# Patient Record
Sex: FEMALE | Race: WHITE | NOT HISPANIC OR LATINO | Employment: OTHER | URBAN - METROPOLITAN AREA
[De-identification: names, ages, dates, MRNs, and addresses within clinical notes are randomized per-mention and may not be internally consistent; named-entity substitution may affect disease eponyms.]

---

## 2018-05-15 PROBLEM — M25.512 ACUTE PAIN OF LEFT SHOULDER: Status: RESOLVED | Noted: 2018-03-12 | Resolved: 2018-05-15

## 2018-05-15 PROBLEM — M75.102 TEAR OF LEFT ROTATOR CUFF: Status: RESOLVED | Noted: 2018-01-31 | Resolved: 2018-05-15

## 2018-08-21 PROBLEM — F32.A DEPRESSION: Status: ACTIVE | Noted: 2018-08-21

## 2019-01-08 PROBLEM — Q04.8: Status: ACTIVE | Noted: 2019-01-08

## 2021-04-27 PROBLEM — K58.0 IRRITABLE BOWEL SYNDROME WITH DIARRHEA: Status: ACTIVE | Noted: 2021-04-27

## 2022-02-04 PROBLEM — IMO0001 SMOKING: Status: ACTIVE | Noted: 2022-01-17

## 2022-09-13 ENCOUNTER — HOSPITAL ENCOUNTER (OUTPATIENT)
Dept: RADIOLOGY | Facility: HOSPITAL | Age: 57
Discharge: HOME | End: 2022-09-13
Attending: ORTHOPAEDIC SURGERY
Payer: COMMERCIAL

## 2022-09-13 ENCOUNTER — OFFICE VISIT (OUTPATIENT)
Dept: OBGYN CLINIC | Facility: HOSPITAL | Age: 57
End: 2022-09-13
Payer: COMMERCIAL

## 2022-09-13 VITALS
HEART RATE: 71 BPM | HEIGHT: 63 IN | WEIGHT: 125 LBS | SYSTOLIC BLOOD PRESSURE: 112 MMHG | DIASTOLIC BLOOD PRESSURE: 67 MMHG | BODY MASS INDEX: 22.15 KG/M2

## 2022-09-13 DIAGNOSIS — M25.562 LEFT KNEE PAIN, UNSPECIFIED CHRONICITY: ICD-10-CM

## 2022-09-13 DIAGNOSIS — S83.512A CHRONIC RUPTURE OF ACL OF LEFT KNEE: Primary | ICD-10-CM

## 2022-09-13 DIAGNOSIS — M17.12 ARTHRITIS OF LEFT KNEE: ICD-10-CM

## 2022-09-13 PROCEDURE — 99204 OFFICE O/P NEW MOD 45 MIN: CPT | Performed by: ORTHOPAEDIC SURGERY

## 2022-09-13 PROCEDURE — 73560 X-RAY EXAM OF KNEE 1 OR 2: CPT

## 2022-09-13 NOTE — PROGRESS NOTES
Assessment:   Diagnosis ICD-10-CM Associated Orders   1  Chronic rupture of ACL of left knee  S83 512A XR knee 1 or 2 vw left   2  Arthritis of left knee  M17 12        Plan:  A discussion was had regarding the diagnosis of chronic ACL deficiency as well as left knee arthritis contributing to her symptoms of pain instability  Risks and benefits of surgical intervention were discussed  At this time, the patient which did discuss decision to undergo surgery with family and will follow up with us in 4 weeks for decision of possible left total knee replacement  To do next visit:  Return in about 4 weeks (around 10/11/2022) for discussion of possible surgery  The above stated was discussed in layman's terms and the patient expressed understanding  All questions were answered to the patient's satisfaction  Subjective:   Vane Choudhury is a 62 y o  female who presents with signs and symptoms of left knee instability and pain  Patient has prior history of left knee ACL tear, status post ACL reconstruction and retear  Patient states that about 3 or 4 months ago, she started to develop signs of buckling and instability in her left knee  This has been interfering with patient's daily activities, and is causing her significant pain  Patient is here for evaluation further management of her left knee symptoms        Review of systems negative unless otherwise specified in HPI    Past Medical History:   Diagnosis Date    Alcoholism ([facility] [address] ) 95/18    recovering alcoholic, drug addict/compulsive over-eater    Anemia     Anxiety     Callus     Complete Tear of the Anterior cruciate ligament tear of the knee     COPD (chronic obstructive pulmonary disease) ([facility] [address] )     Depression     Difficulty walking     Exposure to hepatitis B     Resolved: 17    History of prior pregnancies     A1    Irritable bowel syndrome     Lung disease     Papanicolaou smear 2010    NEG    Plantar fasciitis     PONV (postoperative nausea and vomiting)     Reactive airway disease     Last assessed: 16       Past Surgical History:   Procedure Laterality Date    BREAST CYST ASPIRATION Right     COLONOSCOPY      CYSTECTOMY Left     benign cystectomy below breast    DILATION AND CURETTAGE OF UTERUS      EAR SURGERY      bmt    FACIAL RECONSTRUCTION SURGERY      FOOT SURGERY Right     endoscopic plantar fasciotomy    GUM SURGERY  2019    with bone graft    KNEE ARTHROSCOPY Left 1997    menisectomy    KNEE ARTHROSCOPY Left 10/1997    ACL repair    MAMMO (HISTORICAL) Bilateral 2010    benign    MYRINGOTOMY W/ TUBES Right 3/1/2019    Procedure: MYRINGOTOMY W/ INSERTION VENTILATION TUBE EAR;  Surgeon: Pranav Gray DO;  [facility] MA[facility];  Service: ENT    MO REPAIR OF Mumtaz Kelly Right 2022    Procedure: REPAIR HAMMERTOE 2nd;  Surgeon: Afshan Mendes DPM;  Location: [address] [place] [address] MAIN OR;  Service: Podiatry    MO [address] ARTHROSCOP,SURG,W/ROTAT CUFF REPR Left 2018    Procedure: SHOULDER ARTHROSCOPY ROTATOR CUFF REPAIR; SUBACROMIAL DECOMPRESSION;  Surgeon: Nadeem Coyle MD;  Location: [facility]  MAIN OR;  Service: Orthopedics    MO TYMPANOPLAS/MASTOIDEC,INTACT WALL Right 3/1/2019    Procedure: TYMPANOPLASTY;  Surgeon: Pranav Gray DO;  Location: [facility] MAIN [facility];  Service: ENT    SHOULDER SURGERY      TONSILLECTOMY AND ADENOIDECTOMY      TUBAL LIGATION      VAGINAL DELIVERY      WISDOM TOOTH EXTRACTION         Family History   Problem Relation Age of Onset    Mental illness Mother     Substance Abuse Mother     Lung cancer Mother     Cancer Mother            Earna Marlee Hypertension Father         Benign Essential    Cataracts Father     Diabetes Father            Earna Marlee Mental illness Father     Hyperlipidemia Father     Depression Father     Cancer Father         GIST-     Schizophrenia Sister     Squamous cell carcinoma Sister     Diabetes Sister    Earna Marlee Hypertension Sister     Mental illness Sister     Cancer Sister     Cancer Maternal Grandmother             Diabetes Maternal Grandmother     Heart failure Maternal Grandfather     Lung cancer Paternal Grandmother     Hyperlipidemia Paternal Grandmother     Cancer Paternal Grandmother             Hyperlipidemia Paternal Grandfather     No Known Problems Son     Pulmonary fibrosis Paternal Aunt     Schizophrenia Paternal Aunt     Diabetes Paternal Aunt     Diabetes Brother     Hypertension Brother     Hyperlipidemia Brother     Diabetes Brother     Hypertension Brother     Diabetes Brother         possibly one or both    Mental illness Paternal Aunt        Social History     Occupational History    Not on file   Tobacco Use    Smoking status: Current Every Day Smoker     Packs/day: 0 75     Years: 40 00     Pack years: 30 00     Types: Cigarettes     Start date:     Smokeless tobacco: Never Used    Tobacco comment: smoked 3/4 PPD in the past   Vaping Use    Vaping Use: Never used   Substance and Sexual Activity    Alcohol use: No     Comment: Quit 23 years ago  Recovering alcoholic    Drug use: No     Comment: quit 23 years ago       Sexual activity: Yes     Partners: Male     Birth control/protection: None         Current Outpatient Medications:     albuterol (PROVENTIL HFA,VENTOLIN HFA) 90 mcg/act inhaler, Inhale 2 puffs every 4 (four) hours as needed, Disp: , Rfl:     Ferrous Sulfate 27 MG TABS, Take 45 mg by mouth daily with breakfast , Disp: , Rfl:     ipratropium (ATROVENT) 0 02 % nebulizer solution, USE 1 VIAL VIA NEBULIZER TWO TIMES A DAY, Disp: , Rfl:     Loperamide HCl (IMODIUM PO), Take 2 mg by mouth in the morning  , Disp: , Rfl:     LORazepam (ATIVAN) 0 5 mg tablet, Take 1 tablet (0 5 mg total) by mouth daily as needed for anxiety, Disp: 30 tablet, Rfl: 5    MELATONIN PO, Take 10 mg by mouth daily at bedtime, Disp: , Rfl:     Multiple Minerals-Vitamins (ROSLYN-MAG-ZINC-D PO), Take 1 tablet by mouth daily, Disp: , Rfl:     Multiple Vitamins-Minerals (multivitamin with minerals) tablet, Take 1 tablet by mouth daily, Disp: , Rfl:     zolpidem (AMBIEN) 10 mg tablet, Take 1 tablet (10 mg total) by mouth daily at bedtime as needed for sleep, Disp: 30 tablet, Rfl: 5    clobetasol (TEMOVATE) 0 05 % cream, prn  (Patient not taking: Reported on 6/6/2022), Disp: , Rfl:     levocetirizine (XYZAL) 5 MG tablet, Take 1 tablet (5 mg total) by mouth every evening (Patient not taking: Reported on 8/15/2022), Disp: 30 tablet, Rfl: 5    methocarbamol (ROBAXIN) 500 mg tablet, Take 1 tablet (500 mg total) by mouth 2 (two) times a day as needed for muscle spasms, Disp: 30 tablet, Rfl: 0    Allergies   Allergen Reactions    Percocet [Oxycodone-Acetaminophen] GI Intolerance            Vitals:    09/13/22 1405   BP: 112/67   Pulse: 71       Objective:  Left Knee Exam     Muscle Strength   The patient has normal left knee strength  Tenderness   The patient is experiencing no tenderness  Range of Motion   The patient has normal left knee ROM  Tests   Varus: negative Valgus: negative  Drawer:  Anterior - positive     Posterior - negative  Pivot shift: positive    Other   Erythema: absent  Sensation: normal  Pulse: present  Swelling: mild               Diagnostics, reviewed and taken today if performed as documented: The attending physician has personally reviewed the pertinent films in PACS and interpretation is as follows:  X-ray of the left knee demonstrates femoral and tibial tunnel status post ACL reconstruction surgery, as well as valgus alignment of knee with tricompartmental degenerative changes  No fractures or dislocations are appreciated  Procedures, if performed today:  None performed      Portions of the record may have been created with voice recognition software    Occasional wrong word or "sound a like" substitutions may have occurred due to the inherent limitations of voice recognition software  Read the chart carefully and recognize, using context, where substitutions have occurred

## 2022-09-15 ENCOUNTER — TELEPHONE (OUTPATIENT)
Dept: PODIATRY | Facility: CLINIC | Age: 57
End: 2022-09-15

## 2022-09-15 ENCOUNTER — OFFICE VISIT (OUTPATIENT)
Dept: OBGYN CLINIC | Facility: HOSPITAL | Age: 57
End: 2022-09-15
Payer: COMMERCIAL

## 2022-09-15 ENCOUNTER — OFFICE VISIT (OUTPATIENT)
Dept: PODIATRY | Facility: CLINIC | Age: 57
End: 2022-09-15
Payer: COMMERCIAL

## 2022-09-15 VITALS
WEIGHT: 125 LBS | DIASTOLIC BLOOD PRESSURE: 65 MMHG | HEIGHT: 62 IN | SYSTOLIC BLOOD PRESSURE: 99 MMHG | HEART RATE: 69 BPM | BODY MASS INDEX: 23 KG/M2

## 2022-09-15 VITALS
BODY MASS INDEX: 22.78 KG/M2 | HEIGHT: 62 IN | WEIGHT: 123.8 LBS | HEART RATE: 65 BPM | DIASTOLIC BLOOD PRESSURE: 73 MMHG | SYSTOLIC BLOOD PRESSURE: 129 MMHG

## 2022-09-15 DIAGNOSIS — Z98.890 HISTORY OF LEFT KNEE SURGERY: ICD-10-CM

## 2022-09-15 DIAGNOSIS — Z01.810 PRE-OPERATIVE CARDIOVASCULAR EXAMINATION: ICD-10-CM

## 2022-09-15 DIAGNOSIS — M17.12 PRIMARY OSTEOARTHRITIS OF LEFT KNEE: Primary | ICD-10-CM

## 2022-09-15 DIAGNOSIS — M25.562 CHRONIC PAIN OF LEFT KNEE: ICD-10-CM

## 2022-09-15 DIAGNOSIS — M20.61 ACQUIRED DEFORMITY OF RIGHT TOE: Primary | ICD-10-CM

## 2022-09-15 DIAGNOSIS — G89.29 CHRONIC PAIN OF LEFT KNEE: ICD-10-CM

## 2022-09-15 DIAGNOSIS — Z01.812 PRE-OPERATIVE LABORATORY EXAMINATION: ICD-10-CM

## 2022-09-15 DIAGNOSIS — Z96.652 AFTERCARE FOLLOWING LEFT KNEE JOINT REPLACEMENT SURGERY: ICD-10-CM

## 2022-09-15 DIAGNOSIS — Z47.1 AFTERCARE FOLLOWING LEFT KNEE JOINT REPLACEMENT SURGERY: ICD-10-CM

## 2022-09-15 PROCEDURE — 99214 OFFICE O/P EST MOD 30 MIN: CPT | Performed by: ORTHOPAEDIC SURGERY

## 2022-09-15 PROCEDURE — 99212 OFFICE O/P EST SF 10 MIN: CPT | Performed by: PODIATRIST

## 2022-09-15 RX ORDER — FERROUS SULFATE TAB EC 324 MG (65 MG FE EQUIVALENT) 324 (65 FE) MG
TABLET DELAYED RESPONSE ORAL
Qty: 30 TABLET | Refills: 0 | Status: SHIPPED | OUTPATIENT
Start: 2022-09-15

## 2022-09-15 RX ORDER — GABAPENTIN 300 MG/1
300 CAPSULE ORAL ONCE
OUTPATIENT
Start: 2022-09-15 | End: 2022-09-15

## 2022-09-15 RX ORDER — ASCORBIC ACID 500 MG
500 TABLET ORAL 2 TIMES DAILY
Qty: 60 TABLET | Refills: 0 | Status: SHIPPED | OUTPATIENT
Start: 2022-09-15

## 2022-09-15 RX ORDER — SODIUM CHLORIDE, SODIUM LACTATE, POTASSIUM CHLORIDE, CALCIUM CHLORIDE 600; 310; 30; 20 MG/100ML; MG/100ML; MG/100ML; MG/100ML
125 INJECTION, SOLUTION INTRAVENOUS CONTINUOUS
OUTPATIENT
Start: 2022-09-15

## 2022-09-15 RX ORDER — CEFAZOLIN SODIUM 2 G/50ML
2000 SOLUTION INTRAVENOUS ONCE
OUTPATIENT
Start: 2022-09-15 | End: 2022-09-15

## 2022-09-15 RX ORDER — ENOXAPARIN SODIUM 100 MG/ML
40 INJECTION SUBCUTANEOUS DAILY
Qty: 11.2 ML | Refills: 0 | Status: SHIPPED | OUTPATIENT
Start: 2022-09-15 | End: 2022-10-13

## 2022-09-15 RX ORDER — CHLORHEXIDINE GLUCONATE 0.12 MG/ML
15 RINSE ORAL ONCE
OUTPATIENT
Start: 2022-09-15 | End: 2022-09-15

## 2022-09-15 RX ORDER — FOLIC ACID 1 MG/1
1 TABLET ORAL DAILY
Qty: 30 TABLET | Refills: 0 | Status: SHIPPED | OUTPATIENT
Start: 2022-09-15

## 2022-09-15 RX ORDER — ACETAMINOPHEN 325 MG/1
975 TABLET ORAL ONCE
OUTPATIENT
Start: 2022-09-15 | End: 2022-09-15

## 2022-09-15 NOTE — PROGRESS NOTES
Patient presents with orthotics and desires refurbishing  Orthotics to be sent back to [facility] lab

## 2022-09-15 NOTE — PROGRESS NOTES
Assessment:   Diagnosis ICD-10-CM Associated Orders   1  Primary osteoarthritis of left knee  M17 12 ascorbic acid (VITAMIN C) 500 MG tablet     ferrous sulfate 324 (65 Fe) mg     folic acid (FOLVITE) 1 mg tablet   2  Chronic pain of left knee  M25 562 ascorbic acid (VITAMIN C) 500 MG tablet    G89 29 ferrous sulfate 324 (65 Fe) mg     folic acid (FOLVITE) 1 mg tablet   3  History of left knee surgery  Z98 890    4  Pre-operative laboratory examination  Z01 812    5  Pre-operative cardiovascular examination  Z01 810    6  Aftercare following left knee joint replacement surgery  Z47 1 enoxaparin (LOVENOX) 40 mg/0 4 mL    Z96 652        Plan:  Diagnosis, treatment options and associated risks were discussed with the patient including no treatment, nonsurgical treatment and potential for surgical intervention  The patient was given the opportunity to ask questions regarding each  Quality of life decision to pursue elective left total knee arthroplasty  Risks and benefits of left total knee arthroplasty were discussed  Consents obtained  Preoperative vitamins and postoperative Lovenox sent to her pharmacy  To do next visit:  Return for post-op with x-rays left knee  The above stated was discussed in layman's terms and the patient expressed understanding  All questions were answered to the patient's satisfaction  Scribe Attestation    I,:  Noe Ellis am acting as a scribe while in the presence of the attending physician :       I,:  Lino Luther MD personally performed the services described in this documentation    as scribed in my presence :             Subjective:   Severiano Rivers is a 62 y o  female who presents today with her  for follow-up evaluation of her left knee, known advanced osteoarthritis  She has recurrent instability in the presence of history of ACL reconstruction with graft re-tear    She continues to have symptoms that affect her day-to-day activities as well as impede on her quality of life  She describes her pain as severe and unrelenting    She wishes to pursue an elective left total knee arthroplasty      Review of systems negative unless otherwise specified in HPI  Review of Systems    Past Medical History:   Diagnosis Date    Alcoholism ([facility] [address] ) 95/18    recovering alcoholic, drug addict/compulsive over-eater    Anemia     Anxiety     Callus     Complete Tear of the Anterior cruciate ligament tear of the knee     COPD (chronic obstructive pulmonary disease) ([facility] [address] )     Depression     Difficulty walking     Exposure to hepatitis B     Resolved: 17    History of prior pregnancies     A1    Irritable bowel syndrome     Lung disease     Papanicolaou smear 2010    NEG    Plantar fasciitis     PONV (postoperative nausea and vomiting)     Reactive airway disease     Last assessed: 16       Past Surgical History:   Procedure Laterality Date    BREAST CYST ASPIRATION Right     COLONOSCOPY      CYSTECTOMY Left     benign cystectomy below breast    DILATION AND CURETTAGE OF UTERUS      EAR SURGERY      bmt    FACIAL RECONSTRUCTION SURGERY      FOOT SURGERY Right     endoscopic plantar fasciotomy    GUM SURGERY  2019    with bone graft    KNEE ARTHROSCOPY Left 1997    menisectomy    KNEE ARTHROSCOPY Left 10/1997    ACL repair    MAMMO (HISTORICAL) Bilateral 2010    benign    MYRINGOTOMY W/ TUBES Right 3/1/2019    Procedure: MYRINGOTOMY W/ INSERTION VENTILATION TUBE EAR;  Surgeon: David Edward DO;  [facility] MA[facility];  Service: ENT    CA REPAIR OF Phoebe Gist Right 2022    Procedure: REPAIR HAMMERTOE 2nd;  Surgeon: Jennifer Schmidt DPM;  Location: [address] [place] [address] MAIN OR;  Service: Podiatry    [place] [address] ARTHROSCOP,SURG,W/ROTAT CUFF REPR Left 2018    Procedure: SHOULDER ARTHROSCOPY ROTATOR CUFF REPAIR; SUBACROMIAL DECOMPRESSION;  Surgeon: Lyric Interiano MD;  [facility];  Service: Orthopedics   [facility] MO TYMPANOPLAS/MASTOIDEC,INTACT WALL Right 3/1/2019    Procedure: TYMPANOPLASTY;  Surgeon: Abigail Cifuentes DO;  Location: [facility] MAIN OR;  Service: ENT    SHOULDER SURGERY      TONSILLECTOMY AND ADENOIDECTOMY      TUBAL LIGATION      VAGINAL DELIVERY      WISDOM TOOTH EXTRACTION         Family History   Problem Relation Age of Onset    Mental illness Mother     Substance Abuse Mother     Lung cancer Mother     Cancer Mother             Hypertension Father         Benign Essential    Cataracts Father     Diabetes Father            [facility] Mental illness Father     Hyperlipidemia Father     Depression Father     Cancer Father         GIST-     Schizophrenia Sister     Squamous cell carcinoma Sister     Diabetes Sister     Hypertension Sister     Mental illness Sister     Cancer Sister     Cancer Maternal Grandmother             Diabetes Maternal Grandmother     Heart failure Maternal Grandfather     Lung cancer Paternal Grandmother     Hyperlipidemia Paternal Grandmother     Cancer Paternal Grandmother             Hyperlipidemia Paternal Grandfather     No Known Problems Son     Pulmonary fibrosis Paternal Aunt     Schizophrenia Paternal Aunt     Diabetes Paternal Aunt     Diabetes Brother     Hypertension Brother     Hyperlipidemia Brother     Diabetes Brother     Hypertension Brother     Diabetes Brother         possibly one or both    Mental illness Paternal Aunt        Social History     Occupational History    Not on file   Tobacco Use    Smoking status: Current Every Day Smoker     Packs/day: 0 75     Years: 40 00     Pack years: 30 00     Types: Cigarettes     Start date:     Smokeless tobacco: Never Used    Tobacco comment: smoked 3/4 PPD in the past   Vaping Use    Vaping Use: Never used   Substance and Sexual Activity    Alcohol use: No     Comment: Quit 23 years ago   Recovering alcoholic    Drug use: No     Comment: quit  years ago   Sexual activity: Yes     Partners: Male     Birth control/protection: None         Current Outpatient Medications:     ascorbic acid (VITAMIN C) 500 MG tablet, Take 1 tablet (500 mg total) by mouth 2 (two) times a day, Disp: 60 tablet, Rfl: 0    enoxaparin (LOVENOX) 40 mg/0 4 mL, Inject 0 4 mL (40 mg total) under the skin daily for 28 days To start postoperatively, Disp: 11 2 mL, Rfl: 0    ferrous sulfate 324 (65 Fe) mg, Take one tablet daily  , Disp: 30 tablet, Rfl: 0    folic acid (FOLVITE) 1 mg tablet, Take 1 tablet (1 mg total) by mouth daily, Disp: 30 tablet, Rfl: 0    albuterol (PROVENTIL HFA,VENTOLIN HFA) 90 mcg/act inhaler, Inhale 2 puffs every 4 (four) hours as needed, Disp: , Rfl:     Ferrous Sulfate 27 MG TABS, Take 45 mg by mouth daily with breakfast , Disp: , Rfl:     ipratropium (ATROVENT) 0 02 % nebulizer solution, USE 1 VIAL VIA NEBULIZER TWO TIMES A DAY, Disp: , Rfl:     Loperamide HCl (IMODIUM PO), Take 2 mg by mouth in the morning  , Disp: , Rfl:     LORazepam (ATIVAN) 0 5 mg tablet, Take 1 tablet (0 5 mg total) by mouth daily as needed for anxiety, Disp: 30 tablet, Rfl: 5    MELATONIN PO, Take 10 mg by mouth daily at bedtime, Disp: , Rfl:     Multiple Minerals-Vitamins (ROSLYN-MAG-ZINC-D PO), Take 1 tablet by mouth daily, Disp: , Rfl:     Multiple Vitamins-Minerals (multivitamin with minerals) tablet, Take 1 tablet by mouth daily, Disp: , Rfl:     zolpidem (AMBIEN) 10 mg tablet, Take 1 tablet (10 mg total) by mouth daily at bedtime as needed for sleep, Disp: 30 tablet, Rfl: 5    Allergies   Allergen Reactions    Percocet [Oxycodone-Acetaminophen] GI Intolerance            Vitals:    09/15/22 1516   BP: 129/73   Pulse: 65       Objective:                    Left Knee Exam     Tenderness   The patient is experiencing tenderness in the lateral joint line and medial joint line      Range of Motion   Extension: 0   Flexion: 120 (With crepitation)     Other   Erythema: absent  Sensation: normal  Swelling: mild  Effusion: no effusion present    Comments:    Mild valgus alignment with slight laxity            Diagnostics, reviewed and taken today if performed as documented:    None performed          Procedures, if performed today:    Procedures    None performed      Portions of the record may have been created with voice recognition software  Occasional wrong word or "sound a like" substitutions may have occurred due to the inherent limitations of voice recognition software  Read the chart carefully and recognize, using context, where substitutions have occurred

## 2022-09-15 NOTE — PATIENT INSTRUCTIONS
Knee Replacement   AMBULATORY CARE:   What you need to know about knee replacement:  Knee replacement is surgery to replace all or part of your knee joint  It is also called knee arthroplasty  How to prepare for knee replacement:   Weeks before your surgery: Your healthcare provider will check your overall health  He or she will ask about your current knee pain or stiffness  Tell your provider how pain or stiffness affects your daily activities or ability to play sports  He or she may also ask about fatigue, anxiety, or depression you may have  Some medicines will need to be stopped weeks before surgery  These medicines include blood thinning medicine, such as aspirin and ibuprofen  It also includes some antirheumatic medicines  Make sure your healthcare provider knows all medicines you are taking  Also ask how long before surgery to stop taking them  Your healthcare provider may have you do exercises to strengthen your leg muscles before surgery  You may need x-rays to help your healthcare provider plan your surgery  Ask about any other tests you may need  The night before and the day of surgery: Your healthcare provider may tell you not to eat or drink anything after midnight on the day of your surgery  You will be told what medicines you can or cannot take the morning of surgery  What will happen during knee replacement:   You may be given general anesthesia to keep you asleep and free from pain during surgery  You may instead be given regional anesthesia, such as spinal or epidural anesthesia, or a peripheral nerve block  Regional anesthesia keeps you numb from the waist down, but you will be awake during surgery  Your surgeon will make an incision over your knee joint  He or she will remove the damaged parts of your knee joint and replace them with a knee implant  The knee implant may be made of metal and plastic  Your surgeon may secure it with medical cement       Your surgeon will move the muscles and other tissues around your joint back into place  He or she will close your incision with stitches or staples  He or she may use strips of medical tape and a bandage to cover your wound  What to expect after knee replacement: It is normal to have increased stiffness and pain after surgery  Your pain and stiffness should get better with exercise  Do not get out of bed until your healthcare provider says it is okay  The physical therapist will help you walk after your surgery  When you walk the same day after surgery, it helps decrease pain and improves the function of your knee  You may use crutches or a walker  You may be in the hospital 1 to 4 days, or you may go home shortly after surgery  Your healthcare provider may talk to you about rehabilitation you can do at home  A physical therapist can teach you exercises to help strengthen your knee and prevent stiffness  You may also need occupational therapy to teach you the best ways to bathe and dress  You may  be given a joint replacement ID card  The card will tell which joint was replaced and when it was replaced  Tell all healthcare providers about your joint replacement surgery  Risks of knee replacement:  You may bleed more than expected or get an infection  Nerves or blood vessels may be damaged during surgery  After surgery, your knee may be stiff or numb  You may continue to have knee pain  You may get a blood clot in your leg  This may become life-threatening  Your implant may get loose or move out of place  The implant may get worn out over time and need to be replaced  Call your local emergency number (911 in the [address] [facility]) for any of the following: You feel lightheaded, short of breath, and have chest pain  You cough up blood  Seek care immediately if:   Your leg feels warm, tender, and painful  It may look swollen and red  You cannot walk or move your knee      Blood soaks through your bandage  Your incision wound comes apart  Your incision area is red, swollen, or draining pus  Call your doctor or surgeon if:   You have a fever or chills  You have trouble moving or bending your knee  You have new knee pain or pain that does not get better with medicine  You have questions or concerns about your condition or care  Medicines: You may  need any of the following:  Prescription pain medicine  may be given  Ask your healthcare provider how to take this medicine safely  Some prescription pain medicines contain acetaminophen  Do not take other medicines that contain acetaminophen without talking to your healthcare provider  Too much acetaminophen may cause liver damage  Prescription pain medicine may cause constipation  Ask your healthcare provider how to prevent or treat constipation  NSAIDs , such as ibuprofen, help decrease swelling, pain, and fever  This medicine is available with or without a doctor's order  NSAIDs can cause stomach bleeding or kidney problems in certain people  If you take blood thinner medicine, always ask your healthcare provider if NSAIDs are safe for you  Always read the medicine label and follow directions  Blood thinners  help prevent blood clots  Clots can cause strokes, heart attacks, and death  The following are general safety guidelines to follow while you are taking a blood thinner:    Watch for bleeding and bruising while you take blood thinners  Watch for bleeding from your gums or nose  Watch for blood in your urine and bowel movements  Use a soft washcloth on your skin, and a soft toothbrush to brush your teeth  This can keep your skin and gums from bleeding  If you shave, use an electric shaver  Do not play contact sports  Tell your dentist and other healthcare providers that you take a blood thinner  Wear a bracelet or necklace that says you take this medicine       Do not start or stop any other medicines unless your healthcare provider tells you to  Many medicines cannot be used with blood thinners  Take your blood thinner exactly as prescribed by your healthcare provider  Do not skip does or take less than prescribed  Tell your provider right away if you forget to take your blood thinner, or if you take too much  Warfarin  is a blood thinner that you may need to take  The following are things you should be aware of if you take warfarin:     Foods and medicines can affect the amount of warfarin in your blood  Do not make major changes to your diet while you take warfarin  Warfarin works best when you eat about the same amount of vitamin K every day  Vitamin K is found in green leafy vegetables and certain other foods  Ask for more information about what to eat when you are taking warfarin  You will need to see your healthcare provider for follow-up visits when you are on warfarin  You will need regular blood tests  These tests are used to decide how much medicine you need  Take your medicine as directed  Contact your healthcare provider if you think your medicine is not helping or if you have side effects  Tell him or her if you are allergic to any medicine  Keep a list of the medicines, vitamins, and herbs you take  Include the amounts, and when and why you take them  Bring the list or the pill bottles to follow-up visits  Carry your medicine list with you in case of an emergency  Self-care:   Apply ice  on your knee for 15 to 20 minutes every hour or as directed  Use an ice pack, or put crushed ice in a plastic bag  Cover it with a towel  Ice helps prevent tissue damage and decreases swelling and pain  Do not get the area wet  until your healthcare provider says it is okay  When your provider says it is okay, carefully wash the area with soap and water  Dry the area and put on new, clean bandages as directed  Care for your incision area  as directed  Do not put powders or lotions over the area   If you have strips of medical tape over the incision area, let them fall off on their own  If they do not fall off within 14 days, gently remove them  Check the area for signs of infection, such as swelling, redness, or pus  Go to physical or occupational therapy , if directed  A physical therapist will teach you exercises to build muscle strength, decrease pain and swelling, and prevent blood clots  An occupational therapist will show you how to do daily activities safely  He or she may recommend assistive devices to help you dress, bathe, and  objects  The assistive devices will help you prevent knee damage from twisting or bending  Prevent blood clots:   Wear pressure stockings as directed  Pressure stockings help keep blood from pooling in your leg veins  Your healthcare provider can prescribe stockings that are right for you  Do not buy over-the-counter pressure stockings unless your healthcare provider says it is okay  They may not fit correctly or may have elastic that cuts off your circulation  Ask your healthcare provider when to start wearing pressure stockings and how long to wear them each day  Do not cross your legs for 6 weeks or as directed  Your risk for blood clots is greater when you cross your legs  You might also move your implant out of place  Activity guidelines:   Know your limits  Start activities slowly and give yourself rest periods  Pain and swelling can increase when you do too much  Do not do an activity until your healthcare provider says you are ready  Use assistive devices as directed  Your thigh muscles will be weak after surgery  Assistive devices will help you not fall  Go up the stairs  by placing your non-operated leg on the step first  Then bring your operated leg to the same step  Repeat  Go down stairs  by placing your operated leg down on the step first  Then bring your non-operated leg to the same step  Repeat  Do light housekeeping only    Ask your healthcare provider which housekeeping activities are okay for you  Do not vacuum, change sheets on a bed, or anything that makes you reach up, bend, or twist     Do not drive for at least 6 weeks  or until your healthcare provider says it is okay  Do not play contact sports, tennis, golf, or ski  until your healthcare provider says it is okay  These activities can loosen your knee implant  Prevent falls:       Remove all loose carpets and cords  These can cause you to trip and fall  Use a shower bench or chair  when you take a shower to limit the time you are standing  Use a toilet seat riser with arms  if your toilet seat is low  A toilet seat riser will help prevent bending or twisting your knee  Metal detectors and MRIs after joint replacement surgery: The metal in your joint may set off metal detectors, such as at an airport  Tell the officials about your joint replacement surgery  You may also want to show your joint replacement ID card, if you were given one  MRIs are considered safe for people with joint replacements  The type of metal used is not magnetic  Tell all healthcare providers about your joint replacement surgery  Follow up with your healthcare provider as directed: Your provider may contact you weeks after your surgery  He or she may ask if surgery helped relieve your pain or stiffness  Tell your provider how well you are able to do your daily activities after surgery  Also tell him or her if you are having any problems with mobility or range of motion  Write down your questions so you remember to ask them during your visits  © Copyright [employer] 2022 Information is for End User's use only and may not be sold, redistributed or otherwise used for commercial purposes  All illustrations and images included in CareNotes® are the copyrighted property of [employer]  or Cristian [address]  The above information is an  only   It is not intended as medical advice for individual conditions or treatments  Talk to your doctor, nurse or pharmacist before following any medical regimen to see if it is safe and effective for you

## 2022-10-04 ENCOUNTER — OFFICE VISIT (OUTPATIENT)
Dept: FAMILY MEDICINE CLINIC | Facility: CLINIC | Age: 57
End: 2022-10-04
Payer: COMMERCIAL

## 2022-10-04 VITALS
HEIGHT: 62 IN | TEMPERATURE: 98.3 F | WEIGHT: 124 LBS | OXYGEN SATURATION: 99 % | SYSTOLIC BLOOD PRESSURE: 110 MMHG | DIASTOLIC BLOOD PRESSURE: 68 MMHG | HEART RATE: 56 BPM | BODY MASS INDEX: 22.82 KG/M2

## 2022-10-04 DIAGNOSIS — F41.9 ANXIETY DISORDER, UNSPECIFIED TYPE: Primary | ICD-10-CM

## 2022-10-04 DIAGNOSIS — M25.562 CHRONIC PAIN OF LEFT KNEE: ICD-10-CM

## 2022-10-04 DIAGNOSIS — G89.29 CHRONIC PAIN OF LEFT KNEE: ICD-10-CM

## 2022-10-04 DIAGNOSIS — G47.00 INSOMNIA, UNSPECIFIED TYPE: ICD-10-CM

## 2022-10-04 PROCEDURE — 99214 OFFICE O/P EST MOD 30 MIN: CPT | Performed by: FAMILY MEDICINE

## 2022-10-04 NOTE — PROGRESS NOTES
Name: Cha Coon      : 1965      MRN: [id number]  Encounter Provider: Bridget Davis DO  Encounter Date: 10/4/2022   Encounter department: [address]     Chief Complaint   Patient presents with    Follow-up     BMI Counseling: Body mass index is 22 68 kg/m²  The BMI in normal range  PHQ-2/9 Depression Screening    Little interest or pleasure in doing things: 0 - not at all  Feeling down, depressed, or hopeless: 0 - not at all  Trouble falling or staying asleep, or sleeping too much: 1 - several days  Feeling tired or having little energy: 0 - not at all  Poor appetite or overeatin - several days  Feeling bad about yourself - or that you are a failure or have let yourself or your family down: 1 - several days  Trouble concentrating on things, such as reading the newspaper or watching television: 0 - not at all  Moving or speaking so slowly that other people could have noticed  Or the opposite - being so fidgety or restless that you have been moving around a lot more than usual: 0 - not at all  Thoughts that you would be better off dead, or of hurting yourself in some way: 0 - not at all  PHQ-9 Score: 3   PHQ-9 Interpretation: No or Minimal depression            1  Anxiety disorder, unspecified type    2  Insomnia, unspecified type    3  Chronic pain of left knee        Tobacco Cessation Counseling: Tobacco cessation counseling was provided  The patient is sincerely urged to quit consumption of tobacco  She is not ready to quit tobacco          Subjective     Anxiety controlled  Seen by ortho and podiatry  Patient with increased pain in left knee with inserts  Scheduled fot total knee this December  Sleeping Ok, anxiety controlle  SH: + tobacco,  past   Stopped going to GYM or hiking because of knee pain    I have spent 25 minutes with Patient  today in which greater than 50% of this time was spent in counseling/coordination of care regarding Risks and benefits of tx options, Patient and family education and Impressions  Review of Systems   Constitutional: Negative  HENT: Negative  Eyes: Negative  Respiratory: Negative  Cardiovascular: Negative  Gastrointestinal: Negative  Genitourinary: Negative  Musculoskeletal: Negative  Left knee pain, gives out  Skin: Negative  Neurological: Negative  Psychiatric/Behavioral: Negative          Past Medical History:   Diagnosis Date    Alcoholism ([facility] ) 95/18    recovering alcoholic, drug addict/compulsive over-eater    Anemia     Anxiety     Callus     Complete Tear of the Anterior cruciate ligament tear of the knee     [facility]     Depression     Difficulty walking     Exposure to hepatitis B     Resolved: 17    History of prior pregnancies     A1    Irritable bowel syndrome     Lung disease     Papanicolaou smear 2010    NEG    Plantar fasciitis     PONV (postoperative nausea and vomiting)     Reactive airway disease     Last assessed: 16     Past Surgical History:   Procedure Laterality Date    BREAST CYST ASPIRATION Right     COLONOSCOPY      CYSTECTOMY Left     benign cystectomy below breast    DILATION AND CURETTAGE OF UTERUS      EAR SURGERY      bmt    FACIAL RECONSTRUCTION SURGERY      FOOT SURGERY Right     endoscopic plantar fasciotomy    GUM SURGERY  2019    with bone graft    KNEE ARTHROSCOPY Left 1997    menisectomy    KNEE ARTHROSCOPY Left 10/1997    ACL repair    MAMMO (HISTORICAL) Bilateral 2010    benign    MYRINGOTOMY W/ TUBES Right 3/1/2019    Procedure: MYRINGOTOMY W/ INSERTION VENTILATION TUBE EAR;  Surgeon: Shirlean Gitelman, DO;  Location:  MAIN OR;  Service: ENT    LA REPAIR OF Vianne Kingsley Right 2022    Procedure: REPAIR HAMMERTOE 2nd;  Surgeon: Evert Pro DPM;  Location:  MAIN OR;  Service: [address] [place] [address] ARTHROSCOP,SURG,W/ROTAT CUFF REPR Left 2018    Procedure: SHOULDER ARTHROSCOPY ROTATOR CUFF REPAIR; SUBACROMIAL DECOMPRESSION;  Surgeon: Junior Henley MD;  [facility];  Service: Orthopedics    VT TYMPANOPLAS/MASTOIDEC,INTACT WALL Right 3/1/2019    Procedure: TYMPANOPLASTY;  Surgeon: Natalie Shay DO;  Location: [facility];  Service: ENT    SHOULDER SURGERY      TONSILLECTOMY AND ADENOIDECTOMY      TUBAL LIGATION      VAGINAL DELIVERY      WISDOM TOOTH EXTRACTION       Family History   Problem Relation Age of Onset    Mental illness Mother     Substance Abuse Mother     Lung cancer Mother     Cancer Mother             Hypertension Father         Benign Essential    Cataracts Father     Diabetes Father            Devonte Daubs Mental illness Father     Hyperlipidemia Father     Depression Father     Cancer Father         GIST-     Schizophrenia Sister     Squamous cell carcinoma Sister     Diabetes Sister     Hypertension Sister     Mental illness Sister     Cancer Sister     Cancer Maternal Grandmother             Diabetes Maternal Grandmother     Heart failure Maternal Grandfather     Lung cancer Paternal Grandmother     Hyperlipidemia Paternal Grandmother     Cancer Paternal Grandmother             Hyperlipidemia Paternal Grandfather     No Known Problems Son     Pulmonary fibrosis Paternal Aunt     Schizophrenia Paternal Aunt     Diabetes Paternal Aunt     Diabetes Brother     Hypertension Brother     Hyperlipidemia Brother     Diabetes Brother     Hypertension Brother     Diabetes Brother         possibly one or both    Mental illness Paternal Aunt      Social History     Socioeconomic History    Marital status: /Civil Union     Spouse name: Not on file    Number of children: 1    Years of education: Not on file    Highest education level: Not on file   Occupational History    Not on file   Tobacco Use    Smoking status: Current Every Day Smoker     Packs/day: 0 75     Years: 40 00     Pack years: 30 00     Types: Cigarettes     Start date: 36    Smokeless tobacco: Never Used    Tobacco comment: smoked 3/4 PPD in the past   Vaping Use    Vaping Use: Never used   Substance and Sexual Activity    Alcohol use: No     Comment: Quit 23 years ago  Recovering alcoholic    Drug use: No     Comment: quit 23 years ago   Sexual activity: Yes     Partners: Male     Birth control/protection: None   Other Topics Concern    Not on file   Social History Narrative    Caffeine use    Daily coffee consumption     Social Determinants of Health     Financial Resource Strain: Not on file   Food Insecurity: Not on file   Transportation Needs: Not on file   Physical Activity: Not on file   Stress: Not on file   Social Connections: Not on file   Intimate Partner Violence: Not on file   Housing Stability: Not on file     Current Outpatient Medications on File Prior to Visit   Medication Sig    albuterol (PROVENTIL HFA,VENTOLIN HFA) 90 mcg/act inhaler Inhale 2 puffs every 4 (four) hours as needed    dextromethorphan-guaifenesin (MUCINEX DM)  MG per 12 hr tablet     ferrous sulfate 324 (65 Fe) mg Take one tablet daily      ipratropium (ATROVENT) 0 02 % nebulizer solution USE 1 VIAL VIA NEBULIZER TWO TIMES A DAY    Loperamide HCl (IMODIUM PO) Take 2 mg by mouth in the morning      LORazepam (ATIVAN) 0 5 mg tablet Take 1 tablet (0 5 mg total) by mouth daily as needed for anxiety    MELATONIN PO Take 10 mg by mouth daily at bedtime    Multiple Minerals-Vitamins (ROSLYN-MAG-ZINC-D PO) Take 1 tablet by mouth daily    Multiple Vitamins-Minerals (multivitamin with minerals) tablet Take 1 tablet by mouth daily    zolpidem (AMBIEN) 10 mg tablet Take 1 tablet (10 mg total) by mouth daily at bedtime as needed for sleep    ascorbic acid (VITAMIN C) 500 MG tablet Take 1 tablet (500 mg total) by mouth 2 (two) times a day (Patient not taking: Reported on 10/4/2022)    enoxaparin (LOVENOX) 40 mg/0 4 mL Inject 0 4 mL (40 mg total) under the skin daily for 28 days To start postoperatively (Patient not taking: Reported on 10/4/2022)    Ferrous Sulfate 27 MG TABS Take 45 mg by mouth daily with breakfast  (Patient not taking: Reported on 20/1/9634)    folic acid (FOLVITE) 1 mg tablet Take 1 tablet (1 mg total) by mouth daily (Patient not taking: Reported on 10/4/2022)     Allergies   Allergen Reactions    Percocet [Oxycodone-Acetaminophen] GI Intolerance     Immunization History   Administered Date(s) Administered    COVID-19 MODERNA VACC 0 5 ML IM 01/12/2021, 02/09/2021    INFLUENZA 11/28/2016, 10/01/2021    Influenza Injectable, MDCK, Preservative Free, Quadrivalent, 0 5 mL 10/18/2019    Influenza Quadrivalent, 6-35 Months IM 11/28/2016    Influenza, injectable, quadrivalent, preservative free 0 5 mL 10/25/2020    Tuberculin Skin Test-PPD Intradermal 09/16/2015, 09/26/2016, 10/03/2017    Zoster Vaccine Recombinant 11/13/2018    influenza, injectable, quadrivalent 10/29/2018       Objective     /68 (BP Location: Left arm, Patient Position: Sitting, Cuff Size: Standard)   Pulse 56   Temp 98 3 °F (36 8 °C) (Temporal)   Ht 5' 2" (1 575 m)   Wt 56 2 kg (124 lb)   LMP 03/01/2019 (Within Weeks)   SpO2 99%   BMI 22 68 kg/m²     Physical Exam  Constitutional:       Appearance: Normal appearance  HENT:      Right Ear: External ear normal       Left Ear: External ear normal    Cardiovascular:      Rate and Rhythm: Normal rate and regular rhythm  Neurological:      Mental Status: She is alert  Psychiatric:         Mood and Affect: Mood normal          Behavior: Behavior normal          Thought Content:  Thought content normal          Judgment: Judgment normal        Zenia Kerr DO

## 2022-11-08 ENCOUNTER — TELEPHONE (OUTPATIENT)
Dept: OBGYN CLINIC | Facility: MEDICAL CENTER | Age: 57
End: 2022-11-08

## 2022-11-08 NOTE — TELEPHONE ENCOUNTER
Caller: Patient    Doctor: Jose J Rashid    Reason for call:     Patient is calling asking if the lab work has to be done on Saturday or could it be done on Friday or Monday before her appointment on 11/15/22 with Dr Julián Chisholm?     Call back#: [phone]

## 2022-11-08 NOTE — TELEPHONE ENCOUNTER
Patient will have labs on Friday  Patient states she is working the night before her surgery and will not be available to take the call on what time she should come for surgery  She is requesting that her  get the call at [phone]

## 2022-11-08 NOTE — TELEPHONE ENCOUNTER
So far as I a m   Concerned, either day would be acceptable  You may call patient and inform of above  PJB

## 2022-11-10 PROBLEM — M17.12 PRIMARY OSTEOARTHRITIS OF LEFT KNEE: Status: ACTIVE | Noted: 2022-11-10

## 2022-11-10 NOTE — PATIENT INSTRUCTIONS
Contact surgical nurse  navigator with any questions regarding preoperative plan or schedule    Stop all over the counter supplements, herbal, naturopathic  medications for 1 week prior to surgery UNLESS prescribed by your surgeon  Hold NSAIDS (i e  advil, alleve, motrin, ibuprofen, celebrex) minimum 7 days prior to surgery  Hold Asprin minimum 7 days prior to surgery  Recommend using Tylenol ( acetaminophen ) 500mg every eight hours during the first week post discharge in conjunction with any additional pain medicine prescribed by your surgeon  Use bowel medicines prescribed by your surgeon ( colace) daily post op during the first 1-2 weeks to avoid post operative constipation issues  Call [phone] with any post discharge concerns or medical issues  The morning of surgery take only the following medication with small sip of water: NOTHING

## 2022-11-10 NOTE — PROGRESS NOTES
Internal Medicine Pre-Operative Evaluation:     Reason for Visit: Pre-operative Evaluation for Risk Stratification and Optimization    Patient ID: Lala Hamilton is a 62 y o  female  Surgery: Arthroplasty of left knee  Referring Provider: Dr Maddy Sprague      Recommendations to Proceed withSurgery    No contraindications to planned surgery     Assessment      Primary osteoarthritis left knee  • Failed conservative measures  • Electing to undergo total joint arthroplasty    Pre-operative Medical Clearance for planned surgery  • Patient is medically cleared for planned procedure  • Patient meets preoperative quality goals as noted below  • Recommendations as listed in PLAN section below  • Contact surgical nurse  navigator with any questions regarding preoperative plan or schedule  • Follow up visit with [facility] medical team 1 week after discharge from surgery as scheduled      COPD  · Cont current inhalers  · Monitor respiratory status post operatively    Tobacco use  · Recommend cessation    H/o etoh use  · Recommend continued cessation    Anxiety  · Takes ativan as needed    Patient Active Problem List   Diagnosis   • Memory difficulties   • Anxiety disorder   • Depression   • Post traumatic stress disorder (PTSD)   • Subependymal grey matter heterotopia (HCC)   • Arachnoid cyst   • Cognitive complaints   • Cholesteatoma of ear   • Mixed conductive and sensorineural hearing loss of right ear with unrestricted hearing of left ear   • Cholesteatoma of attic of right ear   • Sprain of ankle, initial encounter   • Reactive airway disease   • PONV (postoperative nausea and vomiting)   • Irritable bowel syndrome with diarrhea   • Smoking   • Primary osteoarthritis of left knee        Plan:     1  Further preoperative workup as follows:   - none no further testing required may proceed with surgery    2   Medication Management/Recommendations:   - continue all current medicines through morning of surgery with sip of water except the following:  - hold aspirin 7 days prior to surgery  - avoid use of NSAID such as motrin, advil, aleve for 7 days prior to surgery  - hold all OTC herbal or nutritional supplements 7 days before surgery    3  Patient requires further consultation with:   No Consults Required    4  Discharge Planning / Barriers to Discharge  none identified - patients has post discharge therapy plan in place, transportation arranged for discharge day, adequate family support at home to assist with discharge to home  Subjective:           History of Present Illness:     Silas Barahona is a 62 y o  female who presents to the office today for a preoperative consultation at the request of surgeon  The patient understands this is an elective procedure and not emergent  They are electing to undergo planned procedure with an understanding that all surgery has inherent risk  They have worked with their surgeon and failed conservative treatment measures  Today they present for preoperative risk assessment and recommendations for optimization in preparation for surgery  Pt seen in surgical optimization center for upcoming proposed surgery  They have failed previous conservative measures and have elected surgical intervention  Pt meets presurgical lab and BMI optimization goals  Upon interview questioning patient is able to perform greater than 4 METs workload in daily life without any reported cardio-pulmonary symptoms  ROS: No TIA's or unusual headaches, no dysphagia  No prolonged cough  No dyspnea or chest pain on exertion  No abdominal pain, change in bowel habits, black or bloody stools  No urinary tract or BPH symptoms  Positive reported pain in arthritic joint  Positive difficulty with gait  No skin rashes or issues              Objective:      /68   Pulse 68   Ht 5' 3" (1 6 m)   Wt 56 8 kg (125 lb 3 2 oz)   LMP 03/01/2019 (Within Weeks)   SpO2 97%   BMI 22 18 kg/m² General Appearance: no distress, conversive  HEENT: PERRLA, conjuctiva normal; oropharynx clear; mucous membranes moist;   Neck:  Supple, no lymphadenopathy or thyromegaly  Lungs: CTA, normal respiratory effort, no retractions, expiratory effort normal  CV: regular rate and rhythm , PMI normal   ABD: soft non tender, no masses , no hepatic or splenomegaly  EXT: DP pulses intact, no lymphadenopathy, no edema  Skin: normal turgor, normal texture, no rash  Psych: affect normal, mood normal  Neuro: AAOx3        The following portions of the patient's history were reviewed and updated as appropriate: allergies, current medications, past family history, past medical history, past social history, past surgical history and problem list      Past History:       Past Medical History:   Diagnosis Date   • [facility]ism ([facility] ) 95/18    recovering alcoholic, drug addict/compulsive over-eater   • Anemia    • Anxiety    • Callus    • Complete Tear of the Anterior cruciate ligament tear of the knee    • COPD (chronic obstructive pulmonary disease) ([facility] )    • Depression    • Difficulty walking    • Exposure to hepatitis B     Resolved: 17   • History of prior pregnancies     A1   • Irritable bowel syndrome    • Lung disease    • Papanicolaou smear 2010    NEG   • Plantar fasciitis    • PONV (postoperative nausea and vomiting)    • Reactive airway disease     Last assessed: 16    Past Surgical History:   Procedure Laterality Date   • BREAST CYST ASPIRATION Right    • COLONOSCOPY     • CYSTECTOMY Left     benign cystectomy below breast   • DILATION AND CURETTAGE OF UTERUS     • EAR SURGERY      bmt   • FACIAL RECONSTRUCTION SURGERY     • FOOT SURGERY Right     endoscopic plantar fasciotomy   • GUM SURGERY  2019    with bone graft   • KNEE ARTHROSCOPY Left 1997    menisectomy   • KNEE ARTHROSCOPY Left 10/1997    ACL repair   • MAMMO (HISTORICAL) Bilateral 2010    benign   • MYRINGOTOMY W/ TUBES Right 3/1/2019    Procedure: MYRINGOTOMY W/ INSERTION VENTILATION TUBE EAR;  Surgeon: Guy Juan DO;  Location: [facility] MA[facility];  Service: ENT   • MN REPAIR OF HAMMERTOE,ONE Right 2022    Procedure: REPAIR HAMMERTOE 2nd;  Surgeon: Linda Sweet DPM;  Location: [address] [place] [address] MAIN OR;  Service: Podiatry   • MN SHLDR ARTHROSCOP,SURG,W/ROTAT CUFF REPR Left 2018    Procedure: SHOULDER ARTHROSCOPY ROTATOR CUFF REPAIR; SUBACROMIAL DECOMPRESSION;  Surgeon: Santos Fang MD;  Location: [facility]  MAIN [facility];  Service: Orthopedics   • MN TYMPANOPLAS/MASTOIDEC,INTACT WALL Right 3/1/2019    Procedure: TYMPANOPLASTY;  Surgeon: Guy Juan DO;  Location: [facility];  Service: ENT   • SHOULDER SURGERY     • TONSILLECTOMY AND ADENOIDECTOMY     • TUBAL LIGATION     • VAGINAL DELIVERY     • WISDOM TOOTH EXTRACTION            Social History     Tobacco Use   • Smoking status: Current Every Day Smoker     Packs/day: 0 75     Years: 40 00     Pack years: 30 00     Types: Cigarettes     Start date:    • Smokeless tobacco: Never Used   • Tobacco comment: smoked 3/4 PPD in the past   Vaping Use   • Vaping Use: Never used   Substance Use Topics   • Alcohol use: No     Comment: Quit 23 years ago  Recovering alcoholic   • Drug use: No     Comment: quit 23 years ago        Family History   Problem Relation Age of Onset   • Mental illness Mother    • Substance Abuse Mother    • Lung cancer Mother    • Cancer Mother            • Hypertension Father         Benign Essential   • Cataracts Father    • Diabetes Father            • Mental illness Father    • Hyperlipidemia Father    • Depression Father    • Cancer Father         GIST-    • Schizophrenia Sister    • Squamous cell carcinoma Sister    • Diabetes Sister    • Hypertension Sister    • Mental illness Sister    • Cancer Sister    • Cancer Maternal Grandmother            • Diabetes Maternal Grandmother    • Heart failure Maternal Grandfather    • Lung cancer Paternal Grandmother    • Hyperlipidemia Paternal Grandmother    • Cancer Paternal Grandmother            • Hyperlipidemia Paternal Grandfather    • No Known Problems Son    • Pulmonary fibrosis Paternal Aunt    • Schizophrenia Paternal Aunt    • Diabetes Paternal Aunt    • Diabetes Brother    • Hypertension Brother    • Hyperlipidemia Brother    • Diabetes Brother    • Hypertension Brother    • Diabetes Brother         possibly one or both   • Mental illness Paternal Aunt           Allergies: Allergies   Allergen Reactions   • Percocet [Oxycodone-Acetaminophen] GI Intolerance        Current Medications:     Current Outpatient Medications   Medication Instructions   • albuterol (PROVENTIL HFA,VENTOLIN HFA) 90 mcg/act inhaler 2 puffs, Inhalation, Every 4 hours PRN   • ascorbic acid (VITAMIN C) 500 mg, Oral, 2 times daily   • dextromethorphan-guaifenesin (MUCINEX DM)  MG per 12 hr tablet No dose, route, or frequency recorded  • enoxaparin (LOVENOX) 40 mg, Subcutaneous, Daily, To start postoperatively   • ferrous sulfate 324 (65 Fe) mg Take one tablet daily  • folic acid (FOLVITE) 1 mg, Oral, Daily   • Loperamide HCl (IMODIUM PO) 2 mg, Oral, Daily   • LORazepam (ATIVAN) 0 5 mg, Oral, Daily PRN   • MELATONIN PO 10 mg, Oral, Daily at bedtime   • Multiple Minerals-Vitamins (ROSLYN-MAG-ZINC-D PO) 1 tablet, Oral, Daily   • Multiple Vitamins-Minerals (multivitamin with minerals) tablet 1 tablet, Oral, Daily   • tiotropium (Spiriva Respimat) 2 5 MCG/ACT AERS inhaler No dose, route, or frequency recorded  • zolpidem (AMBIEN) 10 mg, Oral, Daily at bedtime PRN             PRE-OP WORKSHEET DATA    Assessment of Pre-Operative Risks     MLJ Quality Hard Stops:  BMI (<40) : Estimated body mass index is 22 18 kg/m² as calculated from the following:    Height as of this encounter: 5' 3" (1 6 m)  Weight as of this encounter: 56 8 kg (125 lb 3 2 oz)  Hgb ( >11):    Lab Results   Component Value Date HGB 14 0 11/11/2022     HbA1c (<7 0) :   Lab Results   Component Value Date    HGBA1C 5 2 11/11/2022     GFR (>60) (Less then 45 = Nephrology consult):  Estimated Creatinine Clearance: 67 6 mL/min (by C-G formula based on SCr of 0 76 mg/dL)  Active Decompensated Chronic Conditions which would delay surgery  No acutely decompensated medical issues such as recent CVA, MI, new onset arrhythmia, severe aortic stenosis, CHF, uncontrolled COPD       Exercise Capacity: (if less the 4 mets consider functional assessment via cardiac stress testing or consultation)    · Able to walk 2 blocks without symptoms?: Yes  · Able to walk 1 flights without symptoms?: Yes    Assessment of intra and post operative respiratory, hemodynamic and thrombotic risks     Prior Anesthesia Reactions: No     Personal history of venous thromboembolic disease? No    History of steroid use > 5 mg for >2 weeks within last year? No    Cardiac Risk Estimation: per the Revised Cardiac Risk Index (Circ  100:1043, 1999),     The patient's risk factors for cardiac complications include :  none    Kandis Garcia has a in hospital cardiac risk of RCI RISK CLASS I (0 risk factors, risk of major cardiac compl  appr  0 5%) based on RCRI calculator          Pre-Op Data Reviewed:       Laboratory Results: I have personally reviewed the pertinent laboratory results/reports     EKG:I have personally reviewed pertinent reports    I personally reviewed and interpreted available tracings in the electronic medical record    1/2022=EKG with SR    OLD RECORDS: reviewed old records in the chart review section if EHR on day of visit      Previous cardiopulmonary studies within the past year:  · Echocardiogram: no  · Cardiac Catheterization: no  · Stress Test: no      Time of visit including pre-visit chart review, visit and post-visit coordination of plan and care , review of pre-surgical lab work, preparation and time spent documenting note in electronic medical record, time spent face-to-face in physical examination answering patient questions: 60 minutes             [address] [place] [address]

## 2022-11-11 ENCOUNTER — APPOINTMENT (OUTPATIENT)
Dept: LAB | Age: 57
End: 2022-11-11

## 2022-11-11 DIAGNOSIS — M25.562 CHRONIC PAIN OF LEFT KNEE: ICD-10-CM

## 2022-11-11 DIAGNOSIS — G89.29 CHRONIC PAIN OF LEFT KNEE: ICD-10-CM

## 2022-11-11 DIAGNOSIS — M17.12 PRIMARY OSTEOARTHRITIS OF LEFT KNEE: ICD-10-CM

## 2022-11-11 LAB
ABO GROUP BLD: NORMAL
ALBUMIN SERPL BCP-MCNC: 3.9 G/DL (ref 3.5–5)
ALP SERPL-CCNC: 61 U/L (ref 46–116)
ALT SERPL W P-5'-P-CCNC: 27 U/L (ref 12–78)
ANION GAP SERPL CALCULATED.3IONS-SCNC: 1 MMOL/L (ref 4–13)
APTT PPP: 30 SECONDS (ref 23–37)
AST SERPL W P-5'-P-CCNC: 18 U/L (ref 5–45)
BASOPHILS # BLD AUTO: 0.06 THOUSANDS/ÂΜL (ref 0–0.1)
BASOPHILS NFR BLD AUTO: 1 % (ref 0–1)
BILIRUB SERPL-MCNC: 0.57 MG/DL (ref 0.2–1)
BLD GP AB SCN SERPL QL: NEGATIVE
BUN SERPL-MCNC: 19 MG/DL (ref 5–25)
CALCIUM SERPL-MCNC: 9.5 MG/DL (ref 8.3–10.1)
CHLORIDE SERPL-SCNC: 107 MMOL/L (ref 96–108)
CO2 SERPL-SCNC: 30 MMOL/L (ref 21–32)
CREAT SERPL-MCNC: 0.76 MG/DL (ref 0.6–1.3)
EOSINOPHIL # BLD AUTO: 0.15 THOUSAND/ÂΜL (ref 0–0.61)
EOSINOPHIL NFR BLD AUTO: 3 % (ref 0–6)
ERYTHROCYTE [DISTWIDTH] IN BLOOD BY AUTOMATED COUNT: 12.9 % (ref 11.6–15.1)
EST. AVERAGE GLUCOSE BLD GHB EST-MCNC: 103 MG/DL
GFR SERPL CREATININE-BSD FRML MDRD: 87 ML/MIN/1.73SQ M
GLUCOSE P FAST SERPL-MCNC: 88 MG/DL (ref 65–99)
HBA1C MFR BLD: 5.2 %
HCT VFR BLD AUTO: 43.9 % (ref 34.8–46.1)
HGB BLD-MCNC: 14 G/DL (ref 11.5–15.4)
IMM GRANULOCYTES # BLD AUTO: 0.01 THOUSAND/UL (ref 0–0.2)
IMM GRANULOCYTES NFR BLD AUTO: 0 % (ref 0–2)
INR PPP: 0.89 (ref 0.84–1.19)
LYMPHOCYTES # BLD AUTO: 1.02 THOUSANDS/ÂΜL (ref 0.6–4.47)
LYMPHOCYTES NFR BLD AUTO: 23 % (ref 14–44)
MCH RBC QN AUTO: 31 PG (ref 26.8–34.3)
MCHC RBC AUTO-ENTMCNC: 31.9 G/DL (ref 31.4–37.4)
MCV RBC AUTO: 97 FL (ref 82–98)
MONOCYTES # BLD AUTO: 0.6 THOUSAND/ÂΜL (ref 0.17–1.22)
MONOCYTES NFR BLD AUTO: 13 % (ref 4–12)
NEUTROPHILS # BLD AUTO: 2.64 THOUSANDS/ÂΜL (ref 1.85–7.62)
NEUTS SEG NFR BLD AUTO: 60 % (ref 43–75)
NRBC BLD AUTO-RTO: 0 /100 WBCS
PLATELET # BLD AUTO: 221 THOUSANDS/UL (ref 149–390)
PMV BLD AUTO: 11.1 FL (ref 8.9–12.7)
POTASSIUM SERPL-SCNC: 4.4 MMOL/L (ref 3.5–5.3)
PROT SERPL-MCNC: 7.1 G/DL (ref 6.4–8.4)
PROTHROMBIN TIME: 12.3 SECONDS (ref 11.6–14.5)
RBC # BLD AUTO: 4.52 MILLION/UL (ref 3.81–5.12)
RH BLD: POSITIVE
SODIUM SERPL-SCNC: 138 MMOL/L (ref 135–147)
SPECIMEN EXPIRATION DATE: NORMAL
WBC # BLD AUTO: 4.48 THOUSAND/UL (ref 4.31–10.16)

## 2022-11-15 ENCOUNTER — OFFICE VISIT (OUTPATIENT)
Dept: INTERNAL MEDICINE CLINIC | Facility: CLINIC | Age: 57
End: 2022-11-15

## 2022-11-15 VITALS
DIASTOLIC BLOOD PRESSURE: 68 MMHG | WEIGHT: 125.2 LBS | OXYGEN SATURATION: 97 % | HEIGHT: 63 IN | BODY MASS INDEX: 22.18 KG/M2 | HEART RATE: 68 BPM | SYSTOLIC BLOOD PRESSURE: 126 MMHG

## 2022-11-15 DIAGNOSIS — F17.200 SMOKING: ICD-10-CM

## 2022-11-15 DIAGNOSIS — J45.20 MILD INTERMITTENT REACTIVE AIRWAY DISEASE WITHOUT COMPLICATION: ICD-10-CM

## 2022-11-15 DIAGNOSIS — Z01.818 PRE-OPERATIVE CLEARANCE: ICD-10-CM

## 2022-11-15 DIAGNOSIS — F41.9 ANXIETY DISORDER, UNSPECIFIED TYPE: Primary | ICD-10-CM

## 2022-11-15 DIAGNOSIS — M17.12 PRIMARY OSTEOARTHRITIS OF LEFT KNEE: ICD-10-CM

## 2022-11-15 RX ORDER — TIOTROPIUM BROMIDE INHALATION SPRAY 3.12 UG/1
SPRAY, METERED RESPIRATORY (INHALATION)
COMMUNITY
Start: 2022-11-01

## 2022-11-16 ENCOUNTER — TELEPHONE (OUTPATIENT)
Dept: OBGYN CLINIC | Facility: HOSPITAL | Age: 57
End: 2022-11-16

## 2022-11-28 RX ORDER — IPRATROPIUM BROMIDE 0.5 MG/2.5ML
0.5 SOLUTION RESPIRATORY (INHALATION) 2 TIMES DAILY
COMMUNITY

## 2022-11-28 NOTE — PRE-PROCEDURE INSTRUCTIONS
Pre-Surgery Instructions:   Medication Instructions   • albuterol (PROVENTIL HFA,VENTOLIN HFA) 90 mcg/act inhaler Uses PRN- OK to take day of surgery   • ascorbic acid (VITAMIN C) 500 MG tablet Hold day of surgery  • dextromethorphan-guaifenesin (MUCINEX DM)  MG per 12 hr tablet Hold day of surgery  • ferrous sulfate 324 (65 Fe) mg Hold day of surgery  • folic acid (FOLVITE) 1 mg tablet Hold day of surgery  • ipratropium (ATROVENT) 0 02 % nebulizer solution Take day of surgery  • Loperamide HCl (IMODIUM PO) Hold day of surgery  • LORazepam (ATIVAN) 0 5 mg tablet Uses PRN- OK to take day of surgery   • MELATONIN PO Stop taking 7 days prior to surgery  • Multiple Minerals-Vitamins (ROSLYN-MAG-ZINC-D PO) Stop taking 7 days prior to surgery  • Multiple Vitamins-Minerals (multivitamin with minerals) tablet Hold day of surgery  • zolpidem (AMBIEN) 10 mg tablet Take night before surgery    Med list reviewed as above  Also instructed pt not to start any new vitamins/supplements preoperatively and to avoid NSAID's  7 days prior to surgery  Tylenol is acceptable if needed  Pt has lovenox syringes and understands they are to start post op after discharge  Pt has and/or is getting CHG surgical soap / cloths  and verbalizes understanding of preoperative showering protocol  All information in the [facility] pt education program reviewed by pt   and all questions answered

## 2022-12-04 LAB
DME PARACHUTE DELIVERY DATE ACTUAL: NORMAL
DME PARACHUTE DELIVERY DATE EXPECTED: NORMAL
DME PARACHUTE DELIVERY DATE REQUESTED: NORMAL
DME PARACHUTE ITEM DESCRIPTION: NORMAL
DME PARACHUTE ORDER STATUS: NORMAL
DME PARACHUTE SUPPLIER NAME: NORMAL
DME PARACHUTE SUPPLIER PHONE: NORMAL

## 2022-12-05 ENCOUNTER — EVALUATION (OUTPATIENT)
Dept: PHYSICAL THERAPY | Age: 57
End: 2022-12-05

## 2022-12-05 DIAGNOSIS — M17.12 PRIMARY OSTEOARTHRITIS OF LEFT KNEE: Primary | ICD-10-CM

## 2022-12-05 DIAGNOSIS — M25.562 CHRONIC PAIN OF LEFT KNEE: ICD-10-CM

## 2022-12-05 DIAGNOSIS — G47.00 INSOMNIA, UNSPECIFIED TYPE: ICD-10-CM

## 2022-12-05 DIAGNOSIS — G89.29 CHRONIC PAIN OF LEFT KNEE: ICD-10-CM

## 2022-12-05 RX ORDER — ZOLPIDEM TARTRATE 10 MG/1
TABLET ORAL
Qty: 30 TABLET | Refills: 5 | Status: SHIPPED | OUTPATIENT
Start: 2022-12-05

## 2022-12-05 NOTE — PROGRESS NOTES
PT Evaluation     Today's date: 2022  Patient name: Eran Coleman  : 1965  MRN: [id number]  Referring provider: Matilda Bell  Dx:   Encounter Diagnosis     ICD-10-CM    1  Primary osteoarthritis of left knee  M17 [id number] Ambulatory referral to Physical Therapy      2  Chronic pain of left knee  M25 [id number] Ambulatory referral to Physical Therapy    G89 [id number]                      Assessment  Assessment details: Patient seen for PT evaluation for pre-op L TKA  PT reviewed patient's home set up, equipment needed  PT established HEP for gentle stretching/strengthening exercises post-op and recommended patient to start her exercises before her surgery as she's able  Patient has been exercising, walking regularly for her health  Patient is scheduled for her post-op apt  PT recommending 2x/week post-op knee replacement  Impairments: abnormal gait, abnormal or restricted ROM, activity intolerance, impaired balance, impaired physical strength, lacks appropriate home exercise program, pain with function, poor posture  and poor body mechanics  Functional limitations: Patient reports L knee pain with standing, walking, IADLs, stair climbing, imbalance- has lost her balance 2* L knee pain and buckling  Patient reports a decrease in her recreational activities 2* knee pain  Understanding of Dx/Px/POC: good   Prognosis: good    Goals  Impairment Goals to be met within 4 weeks  - Decrease pain to 5/10 post-op L knee  - Improve ROM to 90 degrees flexion, active  - Increase ROM to 0 Degrees extension, active  - Improve PROM by 5-10 degrees flex, ext   - Increase strength to 3+/5 throughout  - Reduce edema by 50% L LE     Functional Goals to be met within 4-6 weeks     - Return to Prior Level of Function  - Increase Functional Status Measure to: expected  - Patient will be independent with HEP  - Patient to be I with use of RW  - initiate gait training with [facility] when applicable  - Restore appropriate gait pattern with use of assisted device  Plan  Patient would benefit from: skilled physical therapy  Planned modality interventions: cryotherapy and thermotherapy: hydrocollator packs  Planned therapy interventions: abdominal trunk stabilization, joint mobilization, manual therapy, neuromuscular re-education, patient education, postural training, therapeutic activities, therapeutic exercise, strengthening, stretching, home exercise program, gait training, balance and body mechanics training  Frequency: 2x week  Duration in weeks: 8  Treatment plan discussed with: patient        Subjective Evaluation    History of Present Illness  Mechanism of injury: surgery  Mechanism of injury: Patient will be having elective L knee replacement  by Dr Jade Kwok  Patient with a history of lax ligaments, valgus deformity, reports knee buckling at random times, patient also complains of baker's cyst posterior knee which contributes to some of her knee pain  Patient has been having injections for the knee; and was recommended surgery for the knee  Patient will be returning home to her  after surgery, 2 steps to enter ( with 1 additional threshold step), 1 story home (ranch); laundry room in the basement (spouse does the laundry)  Patient owns no equipment at home, will need a RW, will need a commode for home after the surgery  Does have a shower chair that she can use after the surgery  Appropriate bed height at home, and will be able to  smaller area rugs     Pain  Current pain ratin  At best pain ratin  At worst pain rating: 10  Location: L knee  Quality: sharp and dull ache  Aggravating factors: sitting, standing, walking, stair climbing and lifting  Progression: worsening    Social Support  Steps to enter house: yes  Stairs in house: yes   Lives in: [place]  Lives with: spouse    Employment status: not working    Diagnostic Tests  X-ray: abnormal  MRI studies: abnormal  Treatments  Previous treatment: injection treatment  Patient Goals  Patient goals for therapy: decreased pain, improved balance, increased motion, increased strength and independence with ADLs/IADLs          Objective     Active Range of Motion   Left Hip   Normal active range of motion    Right Hip   Normal active range of motion  Left Knee   Flexion: 124 degrees with pain  Extension: 0 degrees with pain    Right Knee   Flexion: 135 degrees   Extension: 0 degrees   Left Ankle/Foot   Normal active range of motion    Right Ankle/Foot   Normal active range of motion    Mobility   Patellar Mobility:   Left Knee   Hypomobile: left medial, left lateral, left superior and left inferior    Right Knee   WFL: medial, lateral, superior and inferior    Strength/Myotome Testing     Left Hip   Planes of Motion   Flexion: 4  Abduction: 5  Adduction: 5    Right Hip   Planes of Motion   Flexion: 4+  Abduction: 5  Adduction: 5    Left Knee   Flexion: 3+  Extension: 4    Right Knee   Flexion: 5  Extension: 5    Left Ankle/Foot   Dorsiflexion: 5  Plantar flexion: 5    Right Ankle/Foot   Dorsiflexion: 5  Plantar flexion: 5    Functional Assessment        Single Leg Stance   Left: 8 (pain) seconds  Right: 15 seconds  Neuro Exam:     Functional outcomes   TU (seconds)  Functional outcome gait comment: Patient ambulates with significant valgus collapse of the L knee during stance phase, decreased/unequal stride length, slightly unbalanced with turning over L LE 2* imbalance and weakness in the L LE                Precautions: FALL RISK    L TKA  Manuals        Knee PROM flex/ext to tolerance        Patellar mobs                        Neuro Re-Ed                SLS        sidestepping        Tandem walking                                Ther Ex                QS        Ankle pumps        Heel slides        Sup hip abd        SAQ        LAQ                SLR x 3, start with L         Ham curls, start with L        HR, B, standing                Nu step Ther Activity                        Gait Training        Gait training with RW                Modalities        CP PRN                Progress as able

## 2022-12-06 ENCOUNTER — TELEPHONE (OUTPATIENT)
Dept: OBGYN CLINIC | Facility: HOSPITAL | Age: 57
End: 2022-12-06

## 2022-12-06 DIAGNOSIS — M17.12 PRIMARY OSTEOARTHRITIS OF LEFT KNEE: Primary | ICD-10-CM

## 2022-12-06 DIAGNOSIS — F41.9 ANXIETY: ICD-10-CM

## 2022-12-06 NOTE — TELEPHONE ENCOUNTER
Caller: Rosa Maria Hickey    Doctor: Trang Bernal    Reason for call: Patient called to request a commode for post surgery. Would like call back to confirm it was ordered    Call back#: [phone]

## 2022-12-07 RX ORDER — LORAZEPAM 0.5 MG/1
0.5 TABLET ORAL DAILY PRN
Qty: 30 TABLET | Refills: 0 | Status: SHIPPED | OUTPATIENT
Start: 2022-12-07 | End: 2023-06-05

## 2022-12-12 DIAGNOSIS — Z96.652 AFTERCARE FOLLOWING LEFT KNEE JOINT REPLACEMENT SURGERY: Primary | ICD-10-CM

## 2022-12-12 DIAGNOSIS — Z47.1 AFTERCARE FOLLOWING LEFT KNEE JOINT REPLACEMENT SURGERY: Primary | ICD-10-CM

## 2022-12-13 ENCOUNTER — ANESTHESIA EVENT (OUTPATIENT)
Dept: PERIOP | Facility: HOSPITAL | Age: 57
End: 2022-12-13

## 2022-12-13 NOTE — ANESTHESIA PREPROCEDURE EVALUATION
Procedure:  ARTHROPLASTY KNEE TOTAL W ROBOT Possible screw removal left distal femur left proximal tibia (Left: Knee)    Relevant Problems   ANESTHESIA   (+) PONV (postoperative nausea and vomiting)      MUSCULOSKELETAL   (+) Primary osteoarthritis of left knee      NEURO/PSYCH   (+) Anxiety disorder   (+) Depression   (+) Post traumatic stress disorder (PTSD)      PULMONARY   (+) COPD (chronic obstructive pulmonary disease) (HCC)   (+) Smoking     11/11/22 platelet count 592, INR 0 89   Denies anticoagulant or antiplatelet use  No hx of back surgery    Denies recent fever, cough or other symptom of upper respiratory tract infection  Confirmed NPO appropriate    Physical Exam    Airway    Mallampati score: II  TM Distance: >3 FB  Neck ROM: full     Dental   No notable dental hx     Cardiovascular      Pulmonary      Other Findings        Anesthesia Plan  ASA Score- 3     Anesthesia Type- spinal with ASA Monitors  Additional Monitors:   Airway Plan:     Comment: Single shot adductor canal block with exparel for post-op pain management, single shot iPACK block for post-op pain management  Plan Factors-Exercise tolerance (METS): >4 METS  Exercise comment: Able to climb two flights of stairs without cardiopulmonary limitation  Chart reviewed  EKG reviewed  Existing labs reviewed  Patient summary reviewed  Patient is a current smoker  Induction- intravenous  Postoperative Plan- Plan for postoperative opioid use  Informed Consent- Anesthetic plan and risks discussed with patient

## 2022-12-14 ENCOUNTER — ANESTHESIA (OUTPATIENT)
Dept: PERIOP | Facility: HOSPITAL | Age: 57
End: 2022-12-14

## 2022-12-14 ENCOUNTER — HOSPITAL ENCOUNTER (OUTPATIENT)
Facility: HOSPITAL | Age: 57
Setting detail: OUTPATIENT SURGERY
Discharge: HOME | End: 2022-12-15
Attending: ORTHOPAEDIC SURGERY | Admitting: ORTHOPAEDIC SURGERY

## 2022-12-14 DIAGNOSIS — J44.9 CHRONIC OBSTRUCTIVE PULMONARY DISEASE, UNSPECIFIED COPD TYPE (HCC): ICD-10-CM

## 2022-12-14 DIAGNOSIS — F41.9 ANXIETY DISORDER, UNSPECIFIED TYPE: ICD-10-CM

## 2022-12-14 DIAGNOSIS — H90.71 MIXED CONDUCTIVE AND SENSORINEURAL HEARING LOSS OF RIGHT EAR WITH UNRESTRICTED HEARING OF LEFT EAR: ICD-10-CM

## 2022-12-14 DIAGNOSIS — M17.12 PRIMARY OSTEOARTHRITIS OF LEFT KNEE: Primary | ICD-10-CM

## 2022-12-14 DIAGNOSIS — K58.0 IRRITABLE BOWEL SYNDROME WITH DIARRHEA: ICD-10-CM

## 2022-12-14 LAB
ABO GROUP BLD: NORMAL
BLD GP AB SCN SERPL QL: NEGATIVE
GLUCOSE SERPL-MCNC: 88 MG/DL (ref 65–140)
RH BLD: POSITIVE
SPECIMEN EXPIRATION DATE: NORMAL

## 2022-12-14 DEVICE — ATTUNE KNEE SYSTEM TIBIAL BASE FIXED BEARING SIZE 4 CEMENTED
Type: IMPLANTABLE DEVICE | Site: KNEE | Status: FUNCTIONAL
Brand: ATTUNE

## 2022-12-14 DEVICE — SMARTSET HV HIGH VISCOSITY BONE CEMENT 40G
Type: IMPLANTABLE DEVICE | Site: KNEE | Status: FUNCTIONAL
Brand: SMARTSET

## 2022-12-14 DEVICE — ATTUNE PATELLA MEDIALIZED DOME 35MM CEMENTED AOX
Type: IMPLANTABLE DEVICE | Site: KNEE | Status: FUNCTIONAL
Brand: ATTUNE

## 2022-12-14 DEVICE — ATTUNE KNEE SYSTEM TIBIAL INSERT FIXED BEARING POSTERIOR STABILIZED 4 7MM AOX
Type: IMPLANTABLE DEVICE | Site: KNEE | Status: FUNCTIONAL
Brand: ATTUNE

## 2022-12-14 DEVICE — ATTUNE KNEE SYSTEM FEMORAL POSTERIOR STABILIZED SIZE 4 LEFT CEMENTED
Type: IMPLANTABLE DEVICE | Site: KNEE | Status: FUNCTIONAL
Brand: ATTUNE

## 2022-12-14 RX ORDER — LIDOCAINE HYDROCHLORIDE 10 MG/ML
0.5 INJECTION, SOLUTION EPIDURAL; INFILTRATION; INTRACAUDAL; PERINEURAL ONCE AS NEEDED
Status: DISCONTINUED | OUTPATIENT
Start: 2022-12-14 | End: 2022-12-14 | Stop reason: HOSPADM

## 2022-12-14 RX ORDER — CHLORHEXIDINE GLUCONATE 0.12 MG/ML
15 RINSE ORAL ONCE
Status: COMPLETED | OUTPATIENT
Start: 2022-12-14 | End: 2022-12-14

## 2022-12-14 RX ORDER — FENTANYL CITRATE/PF 50 MCG/ML
25 SYRINGE (ML) INJECTION
Status: DISCONTINUED | OUTPATIENT
Start: 2022-12-14 | End: 2022-12-14 | Stop reason: HOSPADM

## 2022-12-14 RX ORDER — CEFAZOLIN SODIUM 1 G/3ML
INJECTION, POWDER, FOR SOLUTION INTRAMUSCULAR; INTRAVENOUS AS NEEDED
Status: DISCONTINUED | OUTPATIENT
Start: 2022-12-14 | End: 2022-12-14

## 2022-12-14 RX ORDER — MIDAZOLAM HYDROCHLORIDE 2 MG/2ML
INJECTION, SOLUTION INTRAMUSCULAR; INTRAVENOUS AS NEEDED
Status: DISCONTINUED | OUTPATIENT
Start: 2022-12-14 | End: 2022-12-14

## 2022-12-14 RX ORDER — CEFAZOLIN SODIUM 2 G/50ML
2000 SOLUTION INTRAVENOUS EVERY 8 HOURS
Status: COMPLETED | OUTPATIENT
Start: 2022-12-14 | End: 2022-12-15

## 2022-12-14 RX ORDER — DOCUSATE SODIUM 100 MG/1
100 CAPSULE, LIQUID FILLED ORAL 2 TIMES DAILY
Status: DISCONTINUED | OUTPATIENT
Start: 2022-12-14 | End: 2022-12-15 | Stop reason: HOSPADM

## 2022-12-14 RX ORDER — BUPIVACAINE HYDROCHLORIDE 2.5 MG/ML
INJECTION, SOLUTION EPIDURAL; INFILTRATION; INTRACAUDAL
Status: COMPLETED | OUTPATIENT
Start: 2022-12-14 | End: 2022-12-14

## 2022-12-14 RX ORDER — ONDANSETRON 2 MG/ML
4 INJECTION INTRAMUSCULAR; INTRAVENOUS ONCE AS NEEDED
Status: DISCONTINUED | OUTPATIENT
Start: 2022-12-14 | End: 2022-12-14 | Stop reason: HOSPADM

## 2022-12-14 RX ORDER — CEFAZOLIN SODIUM 2 G/50ML
2000 SOLUTION INTRAVENOUS ONCE
Status: DISCONTINUED | OUTPATIENT
Start: 2022-12-14 | End: 2022-12-14 | Stop reason: HOSPADM

## 2022-12-14 RX ORDER — PROPOFOL 10 MG/ML
INJECTION, EMULSION INTRAVENOUS CONTINUOUS PRN
Status: DISCONTINUED | OUTPATIENT
Start: 2022-12-14 | End: 2022-12-14

## 2022-12-14 RX ORDER — ONDANSETRON 2 MG/ML
4 INJECTION INTRAMUSCULAR; INTRAVENOUS EVERY 6 HOURS PRN
Status: DISCONTINUED | OUTPATIENT
Start: 2022-12-14 | End: 2022-12-15 | Stop reason: HOSPADM

## 2022-12-14 RX ORDER — MAGNESIUM HYDROXIDE 1200 MG/15ML
LIQUID ORAL AS NEEDED
Status: DISCONTINUED | OUTPATIENT
Start: 2022-12-14 | End: 2022-12-14 | Stop reason: HOSPADM

## 2022-12-14 RX ORDER — LANOLIN ALCOHOL/MO/W.PET/CERES
3 CREAM (GRAM) TOPICAL
Status: DISCONTINUED | OUTPATIENT
Start: 2022-12-14 | End: 2022-12-15 | Stop reason: HOSPADM

## 2022-12-14 RX ORDER — SODIUM CHLORIDE, SODIUM LACTATE, POTASSIUM CHLORIDE, CALCIUM CHLORIDE 600; 310; 30; 20 MG/100ML; MG/100ML; MG/100ML; MG/100ML
50 INJECTION, SOLUTION INTRAVENOUS CONTINUOUS
Status: DISCONTINUED | OUTPATIENT
Start: 2022-12-14 | End: 2022-12-15 | Stop reason: HOSPADM

## 2022-12-14 RX ORDER — OXYCODONE HYDROCHLORIDE 5 MG/1
5 TABLET ORAL EVERY 4 HOURS PRN
Status: DISCONTINUED | OUTPATIENT
Start: 2022-12-14 | End: 2022-12-15

## 2022-12-14 RX ORDER — BUPIVACAINE HYDROCHLORIDE 7.5 MG/ML
INJECTION, SOLUTION INTRASPINAL AS NEEDED
Status: DISCONTINUED | OUTPATIENT
Start: 2022-12-14 | End: 2022-12-14

## 2022-12-14 RX ORDER — ACETAMINOPHEN 325 MG/1
975 TABLET ORAL ONCE
Status: COMPLETED | OUTPATIENT
Start: 2022-12-14 | End: 2022-12-14

## 2022-12-14 RX ORDER — DEXAMETHASONE SODIUM PHOSPHATE 10 MG/ML
INJECTION, SOLUTION INTRAMUSCULAR; INTRAVENOUS AS NEEDED
Status: DISCONTINUED | OUTPATIENT
Start: 2022-12-14 | End: 2022-12-14

## 2022-12-14 RX ORDER — FENTANYL CITRATE 50 UG/ML
INJECTION, SOLUTION INTRAMUSCULAR; INTRAVENOUS AS NEEDED
Status: DISCONTINUED | OUTPATIENT
Start: 2022-12-14 | End: 2022-12-14

## 2022-12-14 RX ORDER — LORAZEPAM 0.5 MG/1
0.5 TABLET ORAL DAILY PRN
Status: DISCONTINUED | OUTPATIENT
Start: 2022-12-14 | End: 2022-12-15

## 2022-12-14 RX ORDER — ACETAMINOPHEN 325 MG/1
975 TABLET ORAL EVERY 8 HOURS
Status: DISCONTINUED | OUTPATIENT
Start: 2022-12-14 | End: 2022-12-15

## 2022-12-14 RX ORDER — SODIUM CHLORIDE, SODIUM LACTATE, POTASSIUM CHLORIDE, CALCIUM CHLORIDE 600; 310; 30; 20 MG/100ML; MG/100ML; MG/100ML; MG/100ML
125 INJECTION, SOLUTION INTRAVENOUS CONTINUOUS
Status: DISCONTINUED | OUTPATIENT
Start: 2022-12-14 | End: 2022-12-15 | Stop reason: HOSPADM

## 2022-12-14 RX ORDER — ENOXAPARIN SODIUM 100 MG/ML
40 INJECTION SUBCUTANEOUS
Status: DISCONTINUED | OUTPATIENT
Start: 2022-12-14 | End: 2022-12-15 | Stop reason: HOSPADM

## 2022-12-14 RX ORDER — HYDRALAZINE HYDROCHLORIDE 25 MG/1
25 TABLET, FILM COATED ORAL EVERY 8 HOURS PRN
Status: DISCONTINUED | OUTPATIENT
Start: 2022-12-14 | End: 2022-12-15 | Stop reason: HOSPADM

## 2022-12-14 RX ORDER — SODIUM CHLORIDE, SODIUM LACTATE, POTASSIUM CHLORIDE, CALCIUM CHLORIDE 600; 310; 30; 20 MG/100ML; MG/100ML; MG/100ML; MG/100ML
INJECTION, SOLUTION INTRAVENOUS CONTINUOUS PRN
Status: DISCONTINUED | OUTPATIENT
Start: 2022-12-14 | End: 2022-12-14

## 2022-12-14 RX ORDER — ALBUTEROL SULFATE 90 UG/1
2 AEROSOL, METERED RESPIRATORY (INHALATION) EVERY 4 HOURS PRN
Status: DISCONTINUED | OUTPATIENT
Start: 2022-12-14 | End: 2022-12-15 | Stop reason: HOSPADM

## 2022-12-14 RX ORDER — LIDOCAINE HYDROCHLORIDE 10 MG/ML
INJECTION, SOLUTION EPIDURAL; INFILTRATION; INTRACAUDAL; PERINEURAL AS NEEDED
Status: DISCONTINUED | OUTPATIENT
Start: 2022-12-14 | End: 2022-12-14

## 2022-12-14 RX ORDER — ROPIVACAINE HYDROCHLORIDE 2 MG/ML
INJECTION, SOLUTION EPIDURAL; INFILTRATION; PERINEURAL
Status: COMPLETED | OUTPATIENT
Start: 2022-12-14 | End: 2022-12-14

## 2022-12-14 RX ORDER — OXYCODONE HYDROCHLORIDE 10 MG/1
10 TABLET ORAL EVERY 4 HOURS PRN
Status: DISCONTINUED | OUTPATIENT
Start: 2022-12-14 | End: 2022-12-15

## 2022-12-14 RX ORDER — HYDROMORPHONE HCL/PF 1 MG/ML
0.5 SYRINGE (ML) INJECTION EVERY 2 HOUR PRN
Status: DISCONTINUED | OUTPATIENT
Start: 2022-12-14 | End: 2022-12-15 | Stop reason: HOSPADM

## 2022-12-14 RX ORDER — GABAPENTIN 300 MG/1
300 CAPSULE ORAL ONCE
Status: COMPLETED | OUTPATIENT
Start: 2022-12-14 | End: 2022-12-14

## 2022-12-14 RX ADMIN — DEXAMETHASONE SODIUM PHOSPHATE 10 MG: 10 INJECTION, SOLUTION INTRAMUSCULAR; INTRAVENOUS at 08:26

## 2022-12-14 RX ADMIN — ONDANSETRON 4 MG: 2 INJECTION INTRAMUSCULAR; INTRAVENOUS at 13:53

## 2022-12-14 RX ADMIN — ACETAMINOPHEN 975 MG: 325 TABLET ORAL at 06:55

## 2022-12-14 RX ADMIN — ACETAMINOPHEN 975 MG: 325 TABLET ORAL at 22:17

## 2022-12-14 RX ADMIN — CEFAZOLIN 2000 MG: 1 INJECTION, POWDER, FOR SOLUTION INTRAMUSCULAR; INTRAVENOUS at 08:25

## 2022-12-14 RX ADMIN — ROPIVACAINE HYDROCHLORIDE 20 ML: 2 INJECTION, SOLUTION EPIDURAL; INFILTRATION at 08:00

## 2022-12-14 RX ADMIN — FENTANYL CITRATE 25 MCG: 50 INJECTION INTRAMUSCULAR; INTRAVENOUS at 08:21

## 2022-12-14 RX ADMIN — OXYCODONE HYDROCHLORIDE 10 MG: 10 TABLET ORAL at 17:57

## 2022-12-14 RX ADMIN — BUPIVACAINE HYDROCHLORIDE IN DEXTROSE 1.4 ML: 7.5 INJECTION, SOLUTION SUBARACHNOID at 08:21

## 2022-12-14 RX ADMIN — PHENYLEPHRINE HYDROCHLORIDE 40 MCG/MIN: 10 INJECTION INTRAVENOUS at 08:26

## 2022-12-14 RX ADMIN — SODIUM CHLORIDE, SODIUM LACTATE, POTASSIUM CHLORIDE, AND CALCIUM CHLORIDE 125 ML/HR: .6; .31; .03; .02 INJECTION, SOLUTION INTRAVENOUS at 15:22

## 2022-12-14 RX ADMIN — PROPOFOL 40 MCG/KG/MIN: 10 INJECTION, EMULSION INTRAVENOUS at 08:26

## 2022-12-14 RX ADMIN — ACETAMINOPHEN 975 MG: 325 TABLET ORAL at 13:53

## 2022-12-14 RX ADMIN — FENTANYL CITRATE 25 MCG: 50 INJECTION INTRAMUSCULAR; INTRAVENOUS at 09:50

## 2022-12-14 RX ADMIN — CEFAZOLIN SODIUM 2000 MG: 2 SOLUTION INTRAVENOUS at 23:24

## 2022-12-14 RX ADMIN — BUPIVACAINE HYDROCHLORIDE 5 ML: 2.5 INJECTION, SOLUTION EPIDURAL; INFILTRATION; INTRACAUDAL at 07:55

## 2022-12-14 RX ADMIN — CHLORHEXIDINE GLUCONATE 15 ML: 1.2 SOLUTION ORAL at 06:54

## 2022-12-14 RX ADMIN — SODIUM CHLORIDE, SODIUM LACTATE, POTASSIUM CHLORIDE, AND CALCIUM CHLORIDE 125 ML/HR: .6; .31; .03; .02 INJECTION, SOLUTION INTRAVENOUS at 23:24

## 2022-12-14 RX ADMIN — NICOTINE POLACRILEX 2 MG: 2 GUM, CHEWING BUCCAL at 20:01

## 2022-12-14 RX ADMIN — GABAPENTIN 300 MG: 300 CAPSULE ORAL at 06:55

## 2022-12-14 RX ADMIN — OXYCODONE HYDROCHLORIDE 10 MG: 10 TABLET ORAL at 22:17

## 2022-12-14 RX ADMIN — HYDROMORPHONE HYDROCHLORIDE 0.5 MG: 1 INJECTION, SOLUTION INTRAMUSCULAR; INTRAVENOUS; SUBCUTANEOUS at 20:11

## 2022-12-14 RX ADMIN — LIDOCAINE HYDROCHLORIDE 50 MG: 10 INJECTION, SOLUTION EPIDURAL; INFILTRATION; INTRACAUDAL; PERINEURAL at 08:26

## 2022-12-14 RX ADMIN — CEFAZOLIN SODIUM 2000 MG: 2 SOLUTION INTRAVENOUS at 15:21

## 2022-12-14 RX ADMIN — ONDANSETRON 4 MG: 2 INJECTION INTRAMUSCULAR; INTRAVENOUS at 22:17

## 2022-12-14 RX ADMIN — NICOTINE POLACRILEX 2 MG: 2 GUM, CHEWING BUCCAL at 17:47

## 2022-12-14 RX ADMIN — SODIUM CHLORIDE, SODIUM LACTATE, POTASSIUM CHLORIDE, AND CALCIUM CHLORIDE: .6; .31; .03; .02 INJECTION, SOLUTION INTRAVENOUS at 09:01

## 2022-12-14 RX ADMIN — FENTANYL CITRATE 25 MCG: 50 INJECTION INTRAMUSCULAR; INTRAVENOUS at 08:26

## 2022-12-14 RX ADMIN — SODIUM CHLORIDE, SODIUM LACTATE, POTASSIUM CHLORIDE, AND CALCIUM CHLORIDE: .6; .31; .03; .02 INJECTION, SOLUTION INTRAVENOUS at 09:52

## 2022-12-14 RX ADMIN — TRANEXAMIC ACID 1000 MG: 100 INJECTION, SOLUTION INTRAVENOUS at 08:25

## 2022-12-14 RX ADMIN — MELATONIN 3 MG: at 22:17

## 2022-12-14 RX ADMIN — DOCUSATE SODIUM 100 MG: 100 CAPSULE, LIQUID FILLED ORAL at 17:57

## 2022-12-14 RX ADMIN — ENOXAPARIN SODIUM 40 MG: 40 INJECTION SUBCUTANEOUS at 22:17

## 2022-12-14 RX ADMIN — OXYCODONE HYDROCHLORIDE 10 MG: 10 TABLET ORAL at 13:55

## 2022-12-14 RX ADMIN — MIDAZOLAM 2 MG: 1 INJECTION INTRAMUSCULAR; INTRAVENOUS at 08:15

## 2022-12-14 RX ADMIN — SODIUM CHLORIDE, SODIUM LACTATE, POTASSIUM CHLORIDE, AND CALCIUM CHLORIDE: .6; .31; .03; .02 INJECTION, SOLUTION INTRAVENOUS at 08:15

## 2022-12-14 RX ADMIN — FENTANYL CITRATE 25 MCG: 50 INJECTION INTRAMUSCULAR; INTRAVENOUS at 10:00

## 2022-12-14 RX ADMIN — IPRATROPIUM BROMIDE 0.5 MG: 0.5 SOLUTION RESPIRATORY (INHALATION) at 20:16

## 2022-12-14 NOTE — CONSULTS
Office Visit  9/15/2022  [address], MD  Orthopedic Surgery  Primary osteoarthritis of left knee +5 more  Dx  Left Knee - Follow-up  Reason for Visit     Progress Notes  Siri Shepherd MD (Physician) • • Orthopedic Surgery  Expand All Collapse All  Assessment:    Diagnosis ICD-10-CM Associated Orders   1  Primary osteoarthritis of left knee  M17 12 ascorbic acid (VITAMIN C) 500 MG tablet       ferrous sulfate 324 (65 Fe) mg       folic acid (FOLVITE) 1 mg tablet   2  Chronic pain of left knee  M25 562 ascorbic acid (VITAMIN C) 500 MG tablet     G89 29 ferrous sulfate 324 (65 Fe) mg       folic acid (FOLVITE) 1 mg tablet   3  History of left knee surgery  Z98 890     4  Pre-operative laboratory examination  Z01 812     5  Pre-operative cardiovascular examination  Z01 810     6  Aftercare following left knee joint replacement surgery  Z47 1 enoxaparin (LOVENOX) 40 mg/0 4 mL     G93 554           Plan:  Diagnosis, treatment options and associated risks were discussed with the patient including no treatment, nonsurgical treatment and potential for surgical intervention  The patient was given the opportunity to ask questions regarding each  Quality of life decision to pursue elective left total knee arthroplasty  Risks and benefits of left total knee arthroplasty were discussed  Consents obtained  Preoperative vitamins and postoperative Lovenox sent to her pharmacy      To do next visit:  Return for post-op with x-rays left knee      The above stated was discussed in layman's terms and the patient expressed understanding  All questions were answered to the patient's satisfaction               Scribe Attestation    I,:  Berhane Maxwell am acting as a scribe while in the presence of the attending physician :       I,:  Siri Shepherd MD personally performed the services described in this documentation    as scribed in my presence  :               Subjective:   Gayle Montaño is a 62 y o  female who presents today with her  for follow-up evaluation of her left knee, known advanced osteoarthritis  She has recurrent instability in the presence of history of ACL reconstruction with graft re-tear  She continues to have symptoms that affect her day-to-day activities as well as impede on her quality of life  She describes her pain as severe and unrelenting    She wishes to pursue an elective left total knee arthroplasty        Review of systems negative unless otherwise specified in HPI  Review of Systems     Medical History        Past Medical History:   Diagnosis Date   • Alcoholism ([facility] ) 95/18     recovering alcoholic, drug addict/compulsive over-eater   • Anemia     • Anxiety     • Callus     • Complete Tear of the Anterior cruciate ligament tear of the knee     • COPD (chronic obstructive pulmonary disease) ([facility] )     • Depression     • Difficulty walking     • Exposure to hepatitis B       Resolved: 17   • History of prior pregnancies       A1   • Irritable bowel syndrome     • Lung disease     • Papanicolaou smear 2010     NEG   • Plantar fasciitis     • PONV (postoperative nausea and vomiting)     • Reactive airway disease       Last assessed: 16            Surgical History         Past Surgical History:   Procedure Laterality Date   • BREAST CYST ASPIRATION Right    • COLONOSCOPY       • CYSTECTOMY Left       benign cystectomy below breast   • DILATION AND CURETTAGE OF UTERUS       • EAR SURGERY         bmt   • FACIAL RECONSTRUCTION SURGERY       • FOOT SURGERY Right       endoscopic plantar fasciotomy   • GUM SURGERY   2019     with bone graft   • KNEE ARTHROSCOPY Left 1997     menisectomy   • KNEE ARTHROSCOPY Left 10/1997     ACL repair   • MAMMO (HISTORICAL) Bilateral 2010     benign   • MYRINGOTOMY W/ TUBES Right 3/1/2019     Procedure: MYRINGOTOMY W/ INSERTION VENTILATION TUBE EAR; Surgeon: Josue Robbins DO;  [facility];  Service: ENT   • CA REPAIR OF Roxann Shaver Right 2022     Procedure: REPAIR HAMMERTOE 2nd;  Surgeon: Loren Hashimoto, DPM;  Location: [address] [facility] OR;  Service: Podiatry   • CA SHLDR ARTHROSCOP,SURG,W/ROTAT CUFF REPR Left 2018     Procedure: SHOULDER ARTHROSCOPY ROTATOR CUFF REPAIR; SUBACROMIAL DECOMPRESSION;  Surgeon: Noe Foster MD;  [facility];  Service: Orthopedics   • CA TYMPANOPLAS/MASTOIDEC,INTACT WALL Right 3/1/2019     Procedure: TYMPANOPLASTY;  Surgeon: Josue Robbins DO;  Location: [facility];  Service: ENT   • SHOULDER SURGERY       • TONSILLECTOMY AND ADENOIDECTOMY       • TUBAL LIGATION       • VAGINAL DELIVERY      • WISDOM TOOTH EXTRACTION                Family History         Family History   Problem Relation Age of Onset   • Mental illness Mother     • Substance Abuse Mother     • Lung cancer Mother     • Cancer Mother              • Hypertension Father           Benign Essential   • Cataracts Father     • Diabetes Father              • Mental illness Father     • Hyperlipidemia Father     • Depression Father     • Cancer Father           GIST-    • Schizophrenia Sister     • Squamous cell carcinoma Sister     • Diabetes Sister     • Hypertension Sister     • Mental illness Sister     • Cancer Sister     • Cancer Maternal Grandmother              • Diabetes Maternal Grandmother     • Heart failure Maternal Grandfather     • Lung cancer Paternal Grandmother     • Hyperlipidemia Paternal Grandmother     • Cancer Paternal Grandmother              • Hyperlipidemia Paternal Grandfather     • No Known Problems Son     • Pulmonary fibrosis Paternal Aunt     • Schizophrenia Paternal Aunt     • Diabetes Paternal Aunt     • Diabetes Brother     • Hypertension Brother     • Hyperlipidemia Brother     • Diabetes Brother     • Hypertension Brother     • Diabetes Brother           possibly one or both   • Mental illness Paternal Aunt              Social History            Occupational History   • Not on file   Tobacco Use   • Smoking status: Current Every Day Smoker       Packs/day: 0 75       Years: 40 00       Pack years: 30 00       Types: Cigarettes       Start date: 36   • Smokeless tobacco: Never Used   • Tobacco comment: smoked 3/4 PPD in the past   Vaping Use   • Vaping Use: Never used   Substance and Sexual Activity   • Alcohol use: No       Comment: Quit 23 years ago  Recovering alcoholic   • Drug use: No       Comment: quit 23 years ago  • Sexual activity: Yes       Partners: Male       Birth control/protection: None            Current Outpatient Medications:   •  ascorbic acid (VITAMIN C) 500 MG tablet, Take 1 tablet (500 mg total) by mouth 2 (two) times a day, Disp: 60 tablet, Rfl: 0  •  enoxaparin (LOVENOX) 40 mg/0 4 mL, Inject 0 4 mL (40 mg total) under the skin daily for 28 days To start postoperatively, Disp: 11 2 mL, Rfl: 0  •  ferrous sulfate 324 (65 Fe) mg, Take one tablet daily  , Disp: 30 tablet, Rfl: 0  •  folic acid (FOLVITE) 1 mg tablet, Take 1 tablet (1 mg total) by mouth daily, Disp: 30 tablet, Rfl: 0  •  albuterol (PROVENTIL HFA,VENTOLIN HFA) 90 mcg/act inhaler, Inhale 2 puffs every 4 (four) hours as needed, Disp: , Rfl:   •  Ferrous Sulfate 27 MG TABS, Take 45 mg by mouth daily with breakfast , Disp: , Rfl:   •  ipratropium (ATROVENT) 0 02 % nebulizer solution, USE 1 VIAL VIA NEBULIZER TWO TIMES A DAY, Disp: , Rfl:   •  Loperamide HCl (IMODIUM PO), Take 2 mg by mouth in the morning  , Disp: , Rfl:   •  LORazepam (ATIVAN) 0 5 mg tablet, Take 1 tablet (0 5 mg total) by mouth daily as needed for anxiety, Disp: 30 tablet, Rfl: 5  •  MELATONIN PO, Take 10 mg by mouth daily at bedtime, Disp: , Rfl:   •  Multiple Minerals-Vitamins (ROSLYN-MAG-ZINC-D PO), Take 1 tablet by mouth daily, Disp: , Rfl:   •  Multiple Vitamins-Minerals (multivitamin with minerals) tablet, Take 1 tablet by mouth daily, Disp: , Rfl:   •  zolpidem (AMBIEN) 10 mg tablet, Take 1 tablet (10 mg total) by mouth daily at bedtime as needed for sleep, Disp: 30 tablet, Rfl: 5          Allergies   Allergen Reactions   • Percocet [Oxycodone-Acetaminophen] GI Intolerance                   Vitals:     09/15/22 1516   BP: 129/73   Pulse: 65         Objective:                     Left Knee Exam      Tenderness   The patient is experiencing tenderness in the lateral joint line and medial joint line      Range of Motion   Extension: 0   Flexion: 120 (With crepitation)      Other   Erythema: absent  Sensation: normal  Swelling: mild  Effusion: no effusion present     Comments:    Mild valgus alignment with slight laxity                 Diagnostics, reviewed and taken today if performed as documented:     None performed             Procedures, if performed today:     Procedures     None performed       Portions of the record may have been created with voice recognition software   Occasional wrong word or "sound a like" substitutions may have occurred due to the inherent limitations of voice recognition software   Read the chart carefully and recognize, using context, where substitutions have occurred  Instructions       Return for post-op with x-rays left knee  Knee Replacement   AMBULATORY CARE:   What you need to know about knee replacement:  Knee replacement is surgery to replace all or part of your knee joint  It is also called knee arthroplasty          How to prepare for knee replacement:   1  Weeks before your surgery:       ? Your healthcare provider will check your overall health  He or she will ask about your current knee pain or stiffness  Tell your provider how pain or stiffness affects your daily activities or ability to play sports  He or she may also ask about fatigue, anxiety, or depression you may have       ? Some medicines will need to be stopped weeks before surgery   These medicines include blood thinning medicine, such as aspirin and ibuprofen  It also includes some antirheumatic medicines  Make sure your healthcare provider knows all medicines you are taking  Also ask how long before surgery to stop taking them      ? Your healthcare provider may have you do exercises to strengthen your leg muscles before surgery      ? You may need x-rays to help your healthcare provider plan your surgery  Ask about any other tests you may need      2  The night before and the day of surgery:       ? Your healthcare provider may tell you not to eat or drink anything after midnight on the day of your surgery       ? You will be told what medicines you can or cannot take the morning of surgery      What will happen during knee replacement:   • You may be given general anesthesia to keep you asleep and free from pain during surgery  You may instead be given regional anesthesia, such as spinal or epidural anesthesia, or a peripheral nerve block  Regional anesthesia keeps you numb from the waist down, but you will be awake during surgery       • Your surgeon will make an incision over your knee joint  He or she will remove the damaged parts of your knee joint and replace them with a knee implant  The knee implant may be made of metal and plastic  Your surgeon may secure it with medical cement       • Your surgeon will move the muscles and other tissues around your joint back into place  He or she will close your incision with stitches or staples  He or she may use strips of medical tape and a bandage to cover your wound         What to expect after knee replacement:   • It is normal to have increased stiffness and pain after surgery  Your pain and stiffness should get better with exercise      • Do not get out of bed until your healthcare provider says it is okay  The physical therapist will help you walk after your surgery  When you walk the same day after surgery, it helps decrease pain and improves the function of your knee   You may use crutches or a walker      • You may be in the hospital 1 to 4 days, or you may go home shortly after surgery  Your healthcare provider may talk to you about rehabilitation you can do at home  A physical therapist can teach you exercises to help strengthen your knee and prevent stiffness  You may also need occupational therapy to teach you the best ways to bathe and dress       • You may  be given a joint replacement ID card  The card will tell which joint was replaced and when it was replaced  Tell all healthcare providers about your joint replacement surgery      Risks of knee replacement:  You may bleed more than expected or get an infection  Nerves or blood vessels may be damaged during surgery  After surgery, your knee may be stiff or numb  You may continue to have knee pain  You may get a blood clot in your leg  This may become life-threatening  Your implant may get loose or move out of place  The implant may get worn out over time and need to be replaced  Call your local emergency number (911 in the [address] [place] [address],[address]) for any of the following:   • You feel lightheaded, short of breath, and have chest pain       • You cough up blood      Seek care immediately if:   • Your leg feels warm, tender, and painful  It may look swollen and red      • You cannot walk or move your knee      • Blood soaks through your bandage      • Your incision wound comes apart      • Your incision area is red, swollen, or draining pus      Call your doctor or surgeon if:   • You have a fever or chills      • You have trouble moving or bending your knee      • You have new knee pain or pain that does not get better with medicine      • You have questions or concerns about your condition or care      Medicines: You may  need any of the followin  Prescription pain medicine  may be given  Ask your healthcare provider how to take this medicine safely  Some prescription pain medicines contain acetaminophen   Do not take other medicines that contain acetaminophen without talking to your healthcare provider  Too much acetaminophen may cause liver damage  Prescription pain medicine may cause constipation  Ask your healthcare provider how to prevent or treat constipation       2  NSAIDs , such as ibuprofen, help decrease swelling, pain, and fever  This medicine is available with or without a doctor's order  NSAIDs can cause stomach bleeding or kidney problems in certain people  If you take blood thinner medicine, always ask your healthcare provider if NSAIDs are safe for you  Always read the medicine label and follow directions      3  Blood thinners  help prevent blood clots  Clots can cause strokes, heart attacks, and death  The following are general safety guidelines to follow while you are taking a blood thinner:     1  Watch for bleeding and bruising while you take blood thinners  Watch for bleeding from your gums or nose  Watch for blood in your urine and bowel movements  Use a soft washcloth on your skin, and a soft toothbrush to brush your teeth  This can keep your skin and gums from bleeding  If you shave, use an electric shaver  Do not play contact sports       2  Tell your dentist and other healthcare providers that you take a blood thinner  Wear a bracelet or necklace that says you take this medicine       3  Do not start or stop any other medicines unless your healthcare provider tells you to  Many medicines cannot be used with blood thinners      4  Take your blood thinner exactly as prescribed by your healthcare provider  Do not skip does or take less than prescribed  Tell your provider right away if you forget to take your blood thinner, or if you take too much      5  Warfarin  is a blood thinner that you may need to take  The following are things you should be aware of if you take warfarin:      - Foods and medicines can affect the amount of warfarin in your blood  Do not make major changes to your diet while you take warfarin   Warfarin works best when you eat about the same amount of vitamin K every day  Vitamin K is found in green leafy vegetables and certain other foods  Ask for more information about what to eat when you are taking warfarin      - You will need to see your healthcare provider for follow-up visits when you are on warfarin  You will need regular blood tests  These tests are used to decide how much medicine you need      4  Take your medicine as directed  Contact your healthcare provider if you think your medicine is not helping or if you have side effects  Tell him or her if you are allergic to any medicine  Keep a list of the medicines, vitamins, and herbs you take  Include the amounts, and when and why you take them  Bring the list or the pill bottles to follow-up visits  Carry your medicine list with you in case of an emergency      Self-care:   • Apply ice  on your knee for 15 to 20 minutes every hour or as directed  Use an ice pack, or put crushed ice in a plastic bag  Cover it with a towel  Ice helps prevent tissue damage and decreases swelling and pain       • Do not get the area wet  until your healthcare provider says it is okay  When your provider says it is okay, carefully wash the area with soap and water  Dry the area and put on new, clean bandages as directed      • Care for your incision area  as directed  Do not put powders or lotions over the area  If you have strips of medical tape over the incision area, let them fall off on their own  If they do not fall off within 14 days, gently remove them  Check the area for signs of infection, such as swelling, redness, or pus      • Go to physical or occupational therapy , if directed  A physical therapist will teach you exercises to build muscle strength, decrease pain and swelling, and prevent blood clots  An occupational therapist will show you how to do daily activities safely  He or she may recommend assistive devices to help you dress, bathe, and  objects  The assistive devices will help you prevent knee damage from twisting or bending      Prevent blood clots:   • Wear pressure stockings as directed  Pressure stockings help keep blood from pooling in your leg veins  Your healthcare provider can prescribe stockings that are right for you  Do not buy over-the-counter pressure stockings unless your healthcare provider says it is okay  They may not fit correctly or may have elastic that cuts off your circulation  Ask your healthcare provider when to start wearing pressure stockings and how long to wear them each day           • Do not cross your legs for 6 weeks or as directed  Your risk for blood clots is greater when you cross your legs  You might also move your implant out of place      Activity guidelines:   • Know your limits  Start activities slowly and give yourself rest periods  Pain and swelling can increase when you do too much  Do not do an activity until your healthcare provider says you are ready      • Use assistive devices as directed  Your thigh muscles will be weak after surgery  Assistive devices will help you not fall       • Go up the stairs  by placing your non-operated leg on the step first  Then bring your operated leg to the same step  Repeat      • Go down stairs  by placing your operated leg down on the step first  Then bring your non-operated leg to the same step  Repeat      • Do light housekeeping only  Ask your healthcare provider which housekeeping activities are okay for you  Do not vacuum, change sheets on a bed, or anything that makes you reach up, bend, or twist      • Do not drive for at least 6 weeks  or until your healthcare provider says it is okay      • Do not play contact sports, tennis, golf, or ski  until your healthcare provider says it is okay  These activities can loosen your knee implant      Prevent falls:     •   Remove all loose carpets and cords    These can cause you to trip and fall      • Use a shower bench or chair  when you take a shower to limit the time you are standing      • Use a toilet seat riser with arms  if your toilet seat is low  A toilet seat riser will help prevent bending or twisting your knee      Metal detectors and MRIs after joint replacement surgery:   • The metal in your joint may set off metal detectors, such as at an airport  Tell the officials about your joint replacement surgery  You may also want to show your joint replacement ID card, if you were given one      • MRIs are considered safe for people with joint replacements  The type of metal used is not magnetic  Tell all healthcare providers about your joint replacement surgery      Follow up with your healthcare provider as directed: Your provider may contact you weeks after your surgery  He or she may ask if surgery helped relieve your pain or stiffness  Tell your provider how well you are able to do your daily activities after surgery  Also tell him or her if you are having any problems with mobility or range of motion  Write down your questions so you remember to ask them during your visits  © Copyright [employer] 2022 Information is for End User's use only and may not be sold, redistributed or otherwise used for commercial purposes  All illustrations and images included in CareNotes® are the copyrighted property of [employer]  or [employer]   The above information is an  only  It is not intended as medical advice for individual conditions or treatments   Talk to your doctor, nurse or pharmacist before following any medical regimen to see if it is safe and effective for you                 After Visit Summary (Automatic SnapShot taken 9/15/2022)  Additional Documentation    Vitals:   /73   Pulse 65   Ht 5' 2" (1 575 m)   Wt 56 2 kg (123 lb 12 8 oz)   LMP 03/01/2019 (Within Weeks)   BMI 22 64 kg/m²   BSA 1 56 m²      More Vitals   SmartForms:    [facility] PRE-CHARTING •    [facility] PCMH/PCSP WRAP UP REQUIREMENTS ADVANCED •    [facility] ATTESTATION      (2 more)   Encounter Info:   Billing Info,   History,   Allergies,   Detailed Report        Communications    View Encounter Conversation Summary  Orders Placed       CBC and differential     Comprehensive metabolic panel     Anemia Panel w/Reflex     APTT     Protime-INR     Type and screen     Hemoglobin A1C W/EAG Estimation     PAT Covid Screening     Ambulatory referral to Physical Therapy Pending Review     Case request operating room: ARTHROPLASTY KNEE TOTAL W ROBOT   Possible screw removal left distal femur left proximal tibia Once     All Encounter Results     Medication Changes       Ascorbic Acid 500 mg Oral 2 times daily     Enoxaparin Sodium 40 mg Subcutaneous Daily, To start postoperatively     Folic Acid 1 mg Oral Daily     Ferrous Sulfate        45 mg Oral Daily with breakfast     324 (65 Fe) mg Tbec, Take one tablet daily           Patient not taking:  Reported on 8/15/2022           Medication List     Visit Diagnoses       Primary osteoarthritis of left knee     Chronic pain of left knee     History of left knee surgery     Pre-operative laboratory examination     Pre-operative cardiovascular examination     Aftercare following left knee joint replacement surgery     Problem List     Encounters with Related Results     Appointment (11/11/2022)

## 2022-12-14 NOTE — ANESTHESIA PROCEDURE NOTES
Peripheral Block    Patient location during procedure: holding area  Start time: 12/14/2022 7:55 AM  Reason for block: at surgeon's request and post-op pain management  Staffing  Performed: Anesthesiologist   Anesthesiologist: Edward Faustin MD  Resident/CRNA: Tony Ruiz CRNA  Preanesthetic Checklist  Completed: patient identified, IV checked, site marked, risks and benefits discussed, surgical consent, monitors and equipment checked, pre-op evaluation and timeout performed  Peripheral Block  Patient position: supine  Prep: ChloraPrep  Patient monitoring: heart rate, continuous pulse ox and frequent blood pressure checks  Block type: adductor canal block  Laterality: left  Injection technique: single-shot  Procedures: ultrasound guided, Ultrasound guidance required for the procedure to increase accuracy and safety of medication placement and decrease risk of complications    Ultrasound permanent image savedbupivacaine (PF) (MARCAINE) 0 25 % 10 mL - Perineural   5 mL - 12/14/2022 7:55:00 AM (exparel 20 mL at 0755)  Needle  Needle type: Stimuplex   Needle length: 10 cm  Needle localization: ultrasound guidance  Assessment  Injection assessment: incremental injection, local visualized surrounding nerve on ultrasound and negative aspiration for heme  Paresthesia pain: immediately resolved  Post-procedure:  site cleaned  patient tolerated the procedure well with no immediate complications

## 2022-12-14 NOTE — H&P (VIEW-ONLY)
Office Visit  9/15/2022  [address], MD  Orthopedic Surgery  Primary osteoarthritis of left knee +5 more  Dx  Left Knee - Follow-up  Reason for Visit     Progress Notes  Yousuf Schmidt MD (Physician) • • Orthopedic Surgery  Expand All Collapse All  Assessment:    Diagnosis ICD-10-CM Associated Orders   1  Primary osteoarthritis of left knee  M17 12 ascorbic acid (VITAMIN C) 500 MG tablet       ferrous sulfate 324 (65 Fe) mg       folic acid (FOLVITE) 1 mg tablet   2  Chronic pain of left knee  M25 562 ascorbic acid (VITAMIN C) 500 MG tablet     G89 29 ferrous sulfate 324 (65 Fe) mg       folic acid (FOLVITE) 1 mg tablet   3  History of left knee surgery  Z98 890     4  Pre-operative laboratory examination  Z01 812     5  Pre-operative cardiovascular examination  Z01 810     6  Aftercare following left knee joint replacement surgery  Z47 1 enoxaparin (LOVENOX) 40 mg/0 4 mL     A22 046           Plan:  Diagnosis, treatment options and associated risks were discussed with the patient including no treatment, nonsurgical treatment and potential for surgical intervention  The patient was given the opportunity to ask questions regarding each  Quality of life decision to pursue elective left total knee arthroplasty  Risks and benefits of left total knee arthroplasty were discussed  Consents obtained  Preoperative vitamins and postoperative Lovenox sent to her pharmacy      To do next visit:  Return for post-op with x-rays left knee      The above stated was discussed in layman's terms and the patient expressed understanding  All questions were answered to the patient's satisfaction               Scribe Attestation    I,:  Poppy Barahona am acting as a scribe while in the presence of the attending physician :       I,:  Yousuf Schmidt MD personally performed the services described in this documentation    as scribed in my presence  :               Subjective:   Luke Garcia is a 62 y o  female who presents today with her  for follow-up evaluation of her left knee, known advanced osteoarthritis  She has recurrent instability in the presence of history of ACL reconstruction with graft re-tear  She continues to have symptoms that affect her day-to-day activities as well as impede on her quality of life  She describes her pain as severe and unrelenting    She wishes to pursue an elective left total knee arthroplasty        Review of systems negative unless otherwise specified in HPI  Review of Systems     Medical History        Past Medical History:   Diagnosis Date   • Alcoholism ([facility] ) 95/18     recovering alcoholic, drug addict/compulsive over-eater   • Anemia     • Anxiety     • Callus     • Complete Tear of the Anterior cruciate ligament tear of the knee     • COPD (chronic obstructive pulmonary disease) ([facility] )     • Depression     • Difficulty walking     • Exposure to hepatitis B       Resolved: 17   • History of prior pregnancies       A1   • Irritable bowel syndrome     • Lung disease     • Papanicolaou smear 2010     NEG   • Plantar fasciitis     • PONV (postoperative nausea and vomiting)     • Reactive airway disease       Last assessed: 16            Surgical History         Past Surgical History:   Procedure Laterality Date   • BREAST CYST ASPIRATION Right    • COLONOSCOPY       • CYSTECTOMY Left       benign cystectomy below breast   • DILATION AND CURETTAGE OF UTERUS       • EAR SURGERY         bmt   • FACIAL RECONSTRUCTION SURGERY       • FOOT SURGERY Right       endoscopic plantar fasciotomy   • GUM SURGERY   2019     with bone graft   • KNEE ARTHROSCOPY Left 1997     menisectomy   • KNEE ARTHROSCOPY Left 10/1997     ACL repair   • MAMMO (HISTORICAL) Bilateral 2010     benign   • MYRINGOTOMY W/ TUBES Right 3/1/2019     Procedure: MYRINGOTOMY W/ INSERTION VENTILATION TUBE EAR; Surgeon: Idania Arredondo DO;  Location: [facility];  Service: ENT   • CA REPAIR OF Claus Chars Right 2022     Procedure: REPAIR HAMMERTOE 2nd;  Surgeon: Jean-Claude Wylie DPM;  Location: [facility] MAIN OR;  Service: Podiatry   • CA SHLDR ARTHROSCOP,SURG,W/ROTAT CUFF REPR Left 2018     Procedure: SHOULDER ARTHROSCOPY ROTATOR CUFF REPAIR; SUBACROMIAL DECOMPRESSION;  Surgeon: Tanmay Ochoa MD;  Location: [facility];  Service: Orthopedics   • CA TYMPANOPLAS/MASTOIDEC,INTACT WALL Right 3/1/2019     Procedure: TYMPANOPLASTY;  Surgeon: Idania Arredondo DO;  Location: [facility];  Service: ENT   • SHOULDER SURGERY       • TONSILLECTOMY AND ADENOIDECTOMY       • TUBAL LIGATION       • VAGINAL DELIVERY      • WISDOM TOOTH EXTRACTION                Family History         Family History   Problem Relation Age of Onset   • Mental illness Mother     • Substance Abuse Mother     • Lung cancer Mother     • Cancer Mother              • Hypertension Father           Benign Essential   • Cataracts Father     • Diabetes Father              • Mental illness Father     • Hyperlipidemia Father     • Depression Father     • Cancer Father           GIST-    • Schizophrenia Sister     • Squamous cell carcinoma Sister     • Diabetes Sister     • Hypertension Sister     • Mental illness Sister     • Cancer Sister     • Cancer Maternal Grandmother              • Diabetes Maternal Grandmother     • Heart failure Maternal Grandfather     • Lung cancer Paternal Grandmother     • Hyperlipidemia Paternal Grandmother     • Cancer Paternal Grandmother              • Hyperlipidemia Paternal Grandfather     • No Known Problems Son     • Pulmonary fibrosis Paternal Aunt     • Schizophrenia Paternal Aunt     • Diabetes Paternal Aunt     • Diabetes Brother     • Hypertension Brother     • Hyperlipidemia Brother     • Diabetes Brother     • Hypertension Brother     • Diabetes Brother           possibly one or both   • Mental illness Paternal Aunt              Social History            Occupational History   • Not on file   Tobacco Use   • Smoking status: Current Every Day Smoker       Packs/day: 0 75       Years: 40 00       Pack years: 30 00       Types: Cigarettes       Start date: 36   • Smokeless tobacco: Never Used   • Tobacco comment: smoked 3/4 PPD in the past   Vaping Use   • Vaping Use: Never used   Substance and Sexual Activity   • Alcohol use: No       Comment: Quit 23 years ago  Recovering alcoholic   • Drug use: No       Comment: quit 23 years ago  • Sexual activity: Yes       Partners: Male       Birth control/protection: None            Current Outpatient Medications:   •  ascorbic acid (VITAMIN C) 500 MG tablet, Take 1 tablet (500 mg total) by mouth 2 (two) times a day, Disp: 60 tablet, Rfl: 0  •  enoxaparin (LOVENOX) 40 mg/0 4 mL, Inject 0 4 mL (40 mg total) under the skin daily for 28 days To start postoperatively, Disp: 11 2 mL, Rfl: 0  •  ferrous sulfate 324 (65 Fe) mg, Take one tablet daily  , Disp: 30 tablet, Rfl: 0  •  folic acid (FOLVITE) 1 mg tablet, Take 1 tablet (1 mg total) by mouth daily, Disp: 30 tablet, Rfl: 0  •  albuterol (PROVENTIL HFA,VENTOLIN HFA) 90 mcg/act inhaler, Inhale 2 puffs every 4 (four) hours as needed, Disp: , Rfl:   •  Ferrous Sulfate 27 MG TABS, Take 45 mg by mouth daily with breakfast , Disp: , Rfl:   •  ipratropium (ATROVENT) 0 02 % nebulizer solution, USE 1 VIAL VIA NEBULIZER TWO TIMES A DAY, Disp: , Rfl:   •  Loperamide HCl (IMODIUM PO), Take 2 mg by mouth in the morning  , Disp: , Rfl:   •  LORazepam (ATIVAN) 0 5 mg tablet, Take 1 tablet (0 5 mg total) by mouth daily as needed for anxiety, Disp: 30 tablet, Rfl: 5  •  MELATONIN PO, Take 10 mg by mouth daily at bedtime, Disp: , Rfl:   •  Multiple Minerals-Vitamins (ROSLYN-MAG-ZINC-D PO), Take 1 tablet by mouth daily, Disp: , Rfl:   •  Multiple Vitamins-Minerals (multivitamin with minerals) tablet, Take 1 tablet by mouth daily, Disp: , Rfl:   •  zolpidem (AMBIEN) 10 mg tablet, Take 1 tablet (10 mg total) by mouth daily at bedtime as needed for sleep, Disp: 30 tablet, Rfl: 5          Allergies   Allergen Reactions   • Percocet [Oxycodone-Acetaminophen] GI Intolerance                   Vitals:     09/15/22 1516   BP: 129/73   Pulse: 65         Objective:                     Left Knee Exam      Tenderness   The patient is experiencing tenderness in the lateral joint line and medial joint line      Range of Motion   Extension: 0   Flexion: 120 (With crepitation)      Other   Erythema: absent  Sensation: normal  Swelling: mild  Effusion: no effusion present     Comments:    Mild valgus alignment with slight laxity                 Diagnostics, reviewed and taken today if performed as documented:     None performed             Procedures, if performed today:     Procedures     None performed       Portions of the record may have been created with voice recognition software   Occasional wrong word or "sound a like" substitutions may have occurred due to the inherent limitations of voice recognition software   Read the chart carefully and recognize, using context, where substitutions have occurred  Instructions       Return for post-op with x-rays left knee  Knee Replacement   AMBULATORY CARE:   What you need to know about knee replacement:  Knee replacement is surgery to replace all or part of your knee joint  It is also called knee arthroplasty          How to prepare for knee replacement:   1  Weeks before your surgery:       ? Your healthcare provider will check your overall health  He or she will ask about your current knee pain or stiffness  Tell your provider how pain or stiffness affects your daily activities or ability to play sports  He or she may also ask about fatigue, anxiety, or depression you may have       ? Some medicines will need to be stopped weeks before surgery   These medicines include blood thinning medicine, such as aspirin and ibuprofen  It also includes some antirheumatic medicines  Make sure your healthcare provider knows all medicines you are taking  Also ask how long before surgery to stop taking them      ? Your healthcare provider may have you do exercises to strengthen your leg muscles before surgery      ? You may need x-rays to help your healthcare provider plan your surgery  Ask about any other tests you may need      2  The night before and the day of surgery:       ? Your healthcare provider may tell you not to eat or drink anything after midnight on the day of your surgery       ? You will be told what medicines you can or cannot take the morning of surgery      What will happen during knee replacement:   • You may be given general anesthesia to keep you asleep and free from pain during surgery  You may instead be given regional anesthesia, such as spinal or epidural anesthesia, or a peripheral nerve block  Regional anesthesia keeps you numb from the waist down, but you will be awake during surgery       • Your surgeon will make an incision over your knee joint  He or she will remove the damaged parts of your knee joint and replace them with a knee implant  The knee implant may be made of metal and plastic  Your surgeon may secure it with medical cement       • Your surgeon will move the muscles and other tissues around your joint back into place  He or she will close your incision with stitches or staples  He or she may use strips of medical tape and a bandage to cover your wound         What to expect after knee replacement:   • It is normal to have increased stiffness and pain after surgery  Your pain and stiffness should get better with exercise      • Do not get out of bed until your healthcare provider says it is okay  The physical therapist will help you walk after your surgery  When you walk the same day after surgery, it helps decrease pain and improves the function of your knee   You may use crutches or a walker      • You may be in the hospital 1 to 4 days, or you may go home shortly after surgery  Your healthcare provider may talk to you about rehabilitation you can do at home  A physical therapist can teach you exercises to help strengthen your knee and prevent stiffness  You may also need occupational therapy to teach you the best ways to bathe and dress       • You may  be given a joint replacement ID card  The card will tell which joint was replaced and when it was replaced  Tell all healthcare providers about your joint replacement surgery      Risks of knee replacement:  You may bleed more than expected or get an infection  Nerves or blood vessels may be damaged during surgery  After surgery, your knee may be stiff or numb  You may continue to have knee pain  You may get a blood clot in your leg  This may become life-threatening  Your implant may get loose or move out of place  The implant may get worn out over time and need to be replaced  Call your local emergency number (911 in the [address] [place] [address],[address]) for any of the following:   • You feel lightheaded, short of breath, and have chest pain       • You cough up blood      Seek care immediately if:   • Your leg feels warm, tender, and painful  It may look swollen and red      • You cannot walk or move your knee      • Blood soaks through your bandage      • Your incision wound comes apart      • Your incision area is red, swollen, or draining pus      Call your doctor or surgeon if:   • You have a fever or chills      • You have trouble moving or bending your knee      • You have new knee pain or pain that does not get better with medicine      • You have questions or concerns about your condition or care      Medicines: You may  need any of the followin  Prescription pain medicine  may be given  Ask your healthcare provider how to take this medicine safely  Some prescription pain medicines contain acetaminophen   Do not take other medicines that contain acetaminophen without talking to your healthcare provider  Too much acetaminophen may cause liver damage  Prescription pain medicine may cause constipation  Ask your healthcare provider how to prevent or treat constipation       2  NSAIDs , such as ibuprofen, help decrease swelling, pain, and fever  This medicine is available with or without a doctor's order  NSAIDs can cause stomach bleeding or kidney problems in certain people  If you take blood thinner medicine, always ask your healthcare provider if NSAIDs are safe for you  Always read the medicine label and follow directions      3  Blood thinners  help prevent blood clots  Clots can cause strokes, heart attacks, and death  The following are general safety guidelines to follow while you are taking a blood thinner:     1  Watch for bleeding and bruising while you take blood thinners  Watch for bleeding from your gums or nose  Watch for blood in your urine and bowel movements  Use a soft washcloth on your skin, and a soft toothbrush to brush your teeth  This can keep your skin and gums from bleeding  If you shave, use an electric shaver  Do not play contact sports       2  Tell your dentist and other healthcare providers that you take a blood thinner  Wear a bracelet or necklace that says you take this medicine       3  Do not start or stop any other medicines unless your healthcare provider tells you to  Many medicines cannot be used with blood thinners      4  Take your blood thinner exactly as prescribed by your healthcare provider  Do not skip does or take less than prescribed  Tell your provider right away if you forget to take your blood thinner, or if you take too much      5  Warfarin  is a blood thinner that you may need to take  The following are things you should be aware of if you take warfarin:      - Foods and medicines can affect the amount of warfarin in your blood  Do not make major changes to your diet while you take warfarin   Warfarin works best when you eat about the same amount of vitamin K every day  Vitamin K is found in green leafy vegetables and certain other foods  Ask for more information about what to eat when you are taking warfarin      - You will need to see your healthcare provider for follow-up visits when you are on warfarin  You will need regular blood tests  These tests are used to decide how much medicine you need      4  Take your medicine as directed  Contact your healthcare provider if you think your medicine is not helping or if you have side effects  Tell him or her if you are allergic to any medicine  Keep a list of the medicines, vitamins, and herbs you take  Include the amounts, and when and why you take them  Bring the list or the pill bottles to follow-up visits  Carry your medicine list with you in case of an emergency      Self-care:   • Apply ice  on your knee for 15 to 20 minutes every hour or as directed  Use an ice pack, or put crushed ice in a plastic bag  Cover it with a towel  Ice helps prevent tissue damage and decreases swelling and pain       • Do not get the area wet  until your healthcare provider says it is okay  When your provider says it is okay, carefully wash the area with soap and water  Dry the area and put on new, clean bandages as directed      • Care for your incision area  as directed  Do not put powders or lotions over the area  If you have strips of medical tape over the incision area, let them fall off on their own  If they do not fall off within 14 days, gently remove them  Check the area for signs of infection, such as swelling, redness, or pus      • Go to physical or occupational therapy , if directed  A physical therapist will teach you exercises to build muscle strength, decrease pain and swelling, and prevent blood clots  An occupational therapist will show you how to do daily activities safely  He or she may recommend assistive devices to help you dress, bathe, and  objects  The assistive devices will help you prevent knee damage from twisting or bending      Prevent blood clots:   • Wear pressure stockings as directed  Pressure stockings help keep blood from pooling in your leg veins  Your healthcare provider can prescribe stockings that are right for you  Do not buy over-the-counter pressure stockings unless your healthcare provider says it is okay  They may not fit correctly or may have elastic that cuts off your circulation  Ask your healthcare provider when to start wearing pressure stockings and how long to wear them each day           • Do not cross your legs for 6 weeks or as directed  Your risk for blood clots is greater when you cross your legs  You might also move your implant out of place      Activity guidelines:   • Know your limits  Start activities slowly and give yourself rest periods  Pain and swelling can increase when you do too much  Do not do an activity until your healthcare provider says you are ready      • Use assistive devices as directed  Your thigh muscles will be weak after surgery  Assistive devices will help you not fall       • Go up the stairs  by placing your non-operated leg on the step first  Then bring your operated leg to the same step  Repeat      • Go down stairs  by placing your operated leg down on the step first  Then bring your non-operated leg to the same step  Repeat      • Do light housekeeping only  Ask your healthcare provider which housekeeping activities are okay for you  Do not vacuum, change sheets on a bed, or anything that makes you reach up, bend, or twist      • Do not drive for at least 6 weeks  or until your healthcare provider says it is okay      • Do not play contact sports, tennis, golf, or ski  until your healthcare provider says it is okay  These activities can loosen your knee implant      Prevent falls:     •   Remove all loose carpets and cords    These can cause you to trip and fall      • Use a shower bench or chair  when you take a shower to limit the time you are standing      • Use a toilet seat riser with arms  if your toilet seat is low  A toilet seat riser will help prevent bending or twisting your knee      Metal detectors and MRIs after joint replacement surgery:   • The metal in your joint may set off metal detectors, such as at an airport  Tell the officials about your joint replacement surgery  You may also want to show your joint replacement ID card, if you were given one      • MRIs are considered safe for people with joint replacements  The type of metal used is not magnetic  Tell all healthcare providers about your joint replacement surgery      Follow up with your healthcare provider as directed: Your provider may contact you weeks after your surgery  He or she may ask if surgery helped relieve your pain or stiffness  Tell your provider how well you are able to do your daily activities after surgery  Also tell him or her if you are having any problems with mobility or range of motion  Write down your questions so you remember to ask them during your visits  © Copyright [employer] 2022 Information is for End User's use only and may not be sold, redistributed or otherwise used for commercial purposes  All illustrations and images included in CareNotes® are the copyrighted property of A [employer] M [employer] Inc  or [employer]   The above information is an  only  It is not intended as medical advice for individual conditions or treatments   Talk to your doctor, nurse or pharmacist before following any medical regimen to see if it is safe and effective for you                 After Visit Summary (Automatic SnapShot taken 9/15/2022)  Additional Documentation    Vitals:   /73   Pulse 65   Ht 5' 2" (1 575 m)   Wt 56 2 kg (123 lb 12 8 oz)   LMP 03/01/2019 (Within Weeks)   BMI 22 64 kg/m²   BSA 1 56 m²      More Vitals   SmartForms:    [facility] PRE-CHARTING •    [facility] PCMH/PCSP WRAP UP REQUIREMENTS ADVANCED •    [facility] [employer] ATTESTATION      (2 more)   Encounter Info:   Billing Info,   History,   Allergies,   Detailed Report        Communications    View Encounter Conversation Summary  Orders Placed       CBC and differential     Comprehensive metabolic panel     Anemia Panel w/Reflex     APTT     Protime-INR     Type and screen     Hemoglobin A1C W/EAG Estimation     PAT Covid Screening     Ambulatory referral to Physical Therapy Pending Review     Case request operating room: ARTHROPLASTY KNEE TOTAL W ROBOT   Possible screw removal left distal femur left proximal tibia Once     All Encounter Results     Medication Changes       Ascorbic Acid 500 mg Oral 2 times daily     Enoxaparin Sodium 40 mg Subcutaneous Daily, To start postoperatively     Folic Acid 1 mg Oral Daily     Ferrous Sulfate        45 mg Oral Daily with breakfast     324 (65 Fe) mg Tbec, Take one tablet daily           Patient not taking:  Reported on 8/15/2022           Medication List     Visit Diagnoses       Primary osteoarthritis of left knee     Chronic pain of left knee     History of left knee surgery     Pre-operative laboratory examination     Pre-operative cardiovascular examination     Aftercare following left knee joint replacement surgery     Problem List     Encounters with Related Results     Appointment (11/11/2022)

## 2022-12-14 NOTE — INTERVAL H&P NOTE
H&P reviewed  After examining the patient I find no changes in the patients condition since the H&P had been written  Vitals:    12/14/22 0647   BP: 112/59   Pulse: 69   Resp: 20   Temp: 98 6 °F (37 °C)   SpO2: 99%   Head: Present  CVS: RRR  Lungs: Clear bilateral  Abdomen: Present  Assessment: Adult female osteoarthritis left knee with pain and dysfunction is her symptoms  They persist despite appropriate nonsurgical treatment    She has had a previous ACL reconstruction on this side  : 2 OR for left total knee arthroplasty plus minus hardware removal

## 2022-12-14 NOTE — CONSULTS
Internal Medicine  Consultation Note    Patient: Isaac Kelly  Age/sex: 62 y o  female  Medical Record #: [id number]    Assessment/Plan    Status Post left Total KNEE ARTHROPLASTY with screw removal to proximal left tibia  • Continue post op pain control measures as prescribed  • Follow bowel regimen to help decrease narcotic induced constipation  •  Follow post operative hemoglobin with serial CBC and treat accordingly  • Monitor WBC and fever curve post op while encouraging use of incentive spirometer  • DVT prophylaxis in place and reviewed  COPD  · Continue inhalers as prescribed  · Monitor post op respiratory status    Tobacco use  · Recommend cessation  · Requesting nicotine gum    Anxiety  · Takes ativan as needed  · Would avoid with use of narcotics if possible          PRE-OP HGB LEVEL: 14    Subjective/ HPI: Isaac Kelly was seen and examined  Hx of KNEE pain failed out patient conservative measures  Elected to undergo total KNEE arthroplasty We are asked to see patient for post op management of underlying medical co-morbidities as outlined above  Pt did well intra and post operatively with good hemodynamics  Pt currently comfortable and without any reported post op nausea  ROS:   A 10 point ROS was performed; negative except as noted above  Social History:    Substance Use History:   Social History     Substance and Sexual Activity   Alcohol Use Not Currently    Comment: Quit 23 years ago   Recovering alcoholic     Social History     Tobacco Use   Smoking Status Every Day   • Packs/day: 0 50   • Years: 40 00   • Pack years: 20 00   • Types: Cigarettes   • Start date: 36   Smokeless Tobacco Never     Social History     Substance and Sexual Activity   Drug Use Not Currently   • Types: Cocaine, "Crack" cocaine    Comment: clean since 1995       Family History:    Family History   Problem Relation Age of Onset   • Mental illness Mother    • Substance Abuse Mother    • Lung cancer Mother    • Cancer Mother            • Hypertension Father         Benign Essential   • Cataracts Father    • Diabetes Father            • Mental illness Father    • Hyperlipidemia Father    • Depression Father    • Cancer Father         GIST-    • Schizophrenia Sister    • Squamous cell carcinoma Sister    • Diabetes Sister    • Hypertension Sister    • Mental illness Sister    • Cancer Sister    • Cancer Maternal Grandmother            • Diabetes Maternal Grandmother    • Heart failure Maternal Grandfather    • Lung cancer Paternal Grandmother    • Hyperlipidemia Paternal Grandmother    • Cancer Paternal Grandmother            • Hyperlipidemia Paternal Grandfather    • No Known Problems Son    • Pulmonary fibrosis Paternal Aunt    • Schizophrenia Paternal Aunt    • Diabetes Paternal Aunt    • Diabetes Brother    • Hypertension Brother    • Hyperlipidemia Brother    • Diabetes Brother    • Hypertension Brother    • Diabetes Brother         possibly one or both   • Mental illness Paternal Aunt          Review of Scheduled Meds:  Current Facility-Administered Medications   Medication Dose Route Frequency Provider Last Rate   • acetaminophen  975 mg Oral Q8H Lico Husain MD     • albuterol  2 puff Inhalation Q4H PRN Lico Husain MD     • cefazolin  2,000 mg Intravenous Q8H Lico Husain MD     • docusate sodium  100 mg Oral BID Lico Husain MD     • enoxaparin  40 mg Subcutaneous Q24H [address] Lico Husain MD     • HYDROmorphone  0 5 mg Intravenous Q2H PRN Lico Husain MD     • ipratropium  0 5 mg Nebulization BID Regulo Menjivar MD     • lactated ringers  1,000 mL Intravenous Once PRN Lico Husain MD      And   • lactated ringers  1,000 mL Intravenous Once PRN Lico Husain MD     • lactated ringers  50 mL/hr Intravenous Continuous Lico Husain MD Stopped (22 1035)   • lactated ringers  125 mL/hr Intravenous Continuous Dom Burton  mL/hr (22 1036)   • LORazepam  0 5 mg Oral Daily PRN Dom Burton MD     • melatonin  3 mg Oral HS Dom Burton MD     • oxyCODONE  10 mg Oral Q4H PRN Dom Burton MD     • oxyCODONE  5 mg Oral Q4H PRN Dom Burton MD     • sodium chloride  1,000 mL Intravenous Once PRN Dom Burton MD      And   • sodium chloride  1,000 mL Intravenous Once PRN Dom Burton MD         Labs: Invalid input(s): LABGLOM, CMP               Results from last 7 days   Lab Units 22  1019   POC GLUCOSE mg/dl 88       No results found for: [place]get, WOUNDCULT, SPUTUMCULTUR    Input and Output Summary (last 24 hours): Intake/Output Summary (Last 24 hours) at 2022 1223  Last data filed at 2022 1100  Gross per 24 hour   Intake 2000 ml   Output 525 ml   Net 1475 ml       Imaging:     No orders to display       *Labs /Radiology studiesLabs reviewed  *Medications reviewed and reconciled as needed  *Please refer to order section for additional ordered labs studies  *Case discussed with primary attending during morning huddle case rounds    Vitals:   Temp (24hrs), Av °F (36 7 °C), Min:97 4 °F (36 3 °C), Max:98 6 °F (37 °C)    Temp:  [97 4 °F (36 3 °C)-98 6 °F (37 °C)] 97 4 °F (36 3 °C)  HR:  [52-71] 71  Resp:  [14-20] 17  BP: (100-122)/(52-67) 122/64  SpO2:  [98 %-100 %] 98 %  Body mass index is 21 79 kg/m²       Physical Exam:   General Appearance: no distress, conversive  HEENT: PERRLA, conjuctiva normal; oropharynx clear; mucous membranes moist;   Neck:  Supple, no lymphadenopathy or thyromegaly  Lungs: CTA, normal respiratory effort, no retractions, expiratory effort normal  CV: regular rate and rhythm , PMI normal   ABD: soft non tender, no masses , no hepatic or splenomegaly  EXT: DP pulses intact, no lymphadenopathy, no edema ;  left KNEE dressing in place  Skin: normal turgor, normal texture, no rash  Psych: affect normal, mood normal  Neuro: AAOx3          Invasive Devices     Peripheral Intravenous Line  Duration           Peripheral IV Dorsal (posterior); Right Hand -- days          Epidural Line  Duration           Nerve Block Catheter 01/31/18 1778 days          Drain  Duration           Closed/Suction Drain Anterior; Left Knee Accordion 10 Fr  <1 day    Urethral Catheter Latex 16 Fr  <1 day                   Code Status: Level 1 - Full Code  Current Length of Stay: 0 day(s)    Total floor / unit time spent today 1 hour  Coordination of patient's care was performed in conjunction with primary service  Time invested included review of patient's labs, vitals, and management of their comorbidities with continued monitoring, examination of patient as well as answering patient questions, documenting her findings and creating progress note in electronic medical record,  ordering appropriate diagnostic testing  Medical decision making for the day was made by supervising physician unless otherwise noted in their attestation statement  ** Please Note: Fluency Direct voice to text software may have been used in the creation of this document   Audio transcription errors may occur**

## 2022-12-14 NOTE — PLAN OF CARE
Problem: PAIN - ADULT  Goal: Verbalizes/displays adequate comfort level or baseline comfort level  Description: Interventions:  - Encourage patient to monitor pain and request assistance  - Assess pain using appropriate pain scale  - Administer analgesics based on type and severity of pain and evaluate response  - Implement non-pharmacological measures as appropriate and evaluate response  - Consider cultural and social influences on pain and pain management  - Notify physician/advanced practitioner if interventions unsuccessful or patient reports new pain  Outcome: Progressing     Problem: INFECTION - ADULT  Goal: Absence or prevention of progression during hospitalization  Description: INTERVENTIONS:  - Assess and monitor for signs and symptoms of infection  - Monitor lab/diagnostic results  - Monitor all insertion sites, i e  indwelling lines, tubes, and drains  - Monitor endotracheal if appropriate and nasal secretions for changes in amount and color  - Pimento appropriate cooling/warming therapies per order  - Administer medications as ordered  - Instruct and encourage patient and family to use good hand hygiene technique  - Identify and instruct in appropriate isolation precautions for identified infection/condition  Outcome: Progressing     Problem: DISCHARGE PLANNING  Goal: Discharge to home or other facility with appropriate resources  Description: INTERVENTIONS:  - Identify barriers to discharge w/patient and caregiver  - Arrange for needed discharge resources and transportation as appropriate  - Identify discharge learning needs (meds, wound care, etc )  - Arrange for interpretive services to assist at discharge as needed  - Refer to Case Management Department for coordinating discharge planning if the patient needs post-hospital services based on physician/advanced practitioner order or complex needs related to functional status, cognitive ability, or social support system  Outcome: Progressing Problem: Knowledge Deficit  Goal: Patient/family/caregiver demonstrates understanding of disease process, treatment plan, medications, and discharge instructions  Description: Complete learning assessment and assess knowledge base    Interventions:  - Provide teaching at level of understanding  - Provide teaching via preferred learning methods  Outcome: Progressing

## 2022-12-14 NOTE — PHYSICAL THERAPY NOTE
Physical Therapy Evaluation    Patient Name: Rafita Crowe    BMJUC'C Date: 2022     Problem List  Active Problems:    COPD (chronic obstructive pulmonary disease) ([facility] )       Past Medical History  Past Medical History:   Diagnosis Date    Alcoholism ([facility] ) 95/18    recovering alcoholic, drug addict/compulsive over-eater    Anemia     Anxiety     Arthritis     Callus     Complete Tear of the Anterior cruciate ligament tear of the knee     COPD (chronic obstructive pulmonary disease) ([facility] )     Depression     Difficulty walking     Exposure to hepatitis B     Resolved: 17    History of cholesteatoma     right ear    History of COVID-19 2022    mild s/s    History of prior pregnancies     A1    Irritable bowel syndrome     Lung disease     Papanicolaou smear 2010    NEG    Plantar fasciitis     PONV (postoperative nausea and vomiting)     Reactive airway disease     Last assessed: 16        Past Surgical History  Past Surgical History:   Procedure Laterality Date    BREAST CYST ASPIRATION Right     COLONOSCOPY      CYSTECTOMY Left     benign cystectomy below breast    DILATION AND CURETTAGE OF UTERUS      FACIAL RECONSTRUCTION SURGERY      FOOT SURGERY Right     endoscopic plantar fasciotomy    GUM SURGERY  2019    with bone graft    KNEE ARTHROSCOPY Left 1997    menisectomy    KNEE ARTHROSCOPY Left 10/1997    ACL repair    MAMMO (HISTORICAL) Bilateral 2010    benign    MYRINGOTOMY W/ TUBES Right 2019    Procedure: MYRINGOTOMY W/ INSERTION VENTILATION TUBE EAR;  Surgeon: Melissa Esquivel DO;  Location:  MAIN OR;  Service: ENT    CO REPAIR OF Suzanne Luceroo Right 2022    Procedure: REPAIR HAMMERTOE 2nd;  Surgeon: Barbi Bean DPM;  Location: [address] [place] [address] MAIN OR;  Service: Podiatry    CO SHLDR ARTHROSCOP,SURG,W/ROTAT CUFF REPR Left 2018    Procedure: SHOULDER ARTHROSCOPY ROTATOR CUFF REPAIR; SUBACROMIAL DECOMPRESSION;  Surgeon: Yohan Parada MD;  [facility];  Service: Orthopedics    NH TYMPANOPLAS/MASTOIDEC,INTACT WALL Right 03/01/2019    Procedure: TYMPANOPLASTY;  Surgeon: Niraj Rosenthal DO;  Location: [facility] MAIN [facility];  Service: ENT    SHOULDER SURGERY      TONSILLECTOMY AND ADENOIDECTOMY      TUBAL LIGATION      VAGINAL DELIVERY  1987    WISDOM TOOTH EXTRACTION             12/14/22 1346   PT Last Visit   PT Visit Date 12/14/22   Note Type   Note type Evaluation   Pain Assessment   Pain Assessment Tool 0-10   Pain Score 7   Pain Location/Orientation Orientation: Left; Location: Knee   Patient's Stated Pain Goal No pain   Hospital Pain Intervention(s) Cold applied;Repositioned; Ambulation/increased activity; Emotional support;Elevated   Restrictions/Precautions   Weight Bearing Precautions Per Order Yes   LLE Weight Bearing Per Order WBAT   Other Precautions Chair Alarm; Bed Alarm; Fall Risk;Pain;Multiple lines; hemovac   Home Living   Type of [facility] One level;Stairs to enter with rails; Performs ADLs on one level; Able to live on main level with bedroom/bathroom  (2+1 CARLOS)   Bathroom Shower/Tub Tub/shower unit   Bathroom Toilet Standard   Bathroom Equipment Grab bars in shower; Shower chair   Home Equipment Walker   Additional Comments denies AD use PTA   Prior Function   Level of [place] Independent with ADLs; Independent with functional mobility   Lives With Spouse   Receives Help From Family   IADLs Independent with meal prep; Independent with medication management; Independent with driving   Falls in the last 6 months 1 to 4  (1-knee gave out)   Vocational   (massage therapist, retired PTA)   Comments spouse planning on being home with pt upon d/c;retired RN   General   Family/Caregiver Present Yes  (spouse)   Cognition   Overall Cognitive Status WFL   Arousal/Participation Cooperative   Orientation Level Oriented X4   Memory Within functional limits   Following Commands Follows all commands and directions without difficulty   Comments very pleasant to work with   Subjective   Subjective agreeable to participate   RLE Assessment   RLE Assessment WFL   LLE Assessment   LLE Assessment   (at least 3/5 functionally)   Bed Mobility   Supine to Sit 5  Supervision   Additional items HOB elevated; Increased time required;Verbal cues   Sit to Supine Unable to assess   Additional Comments remained seated in chair upon conclusion   Transfers   Sit to Stand 4  Minimal assistance   Additional items Assist x 1; Increased time required;Verbal cues   Stand to Sit 4  Minimal assistance   Additional items Assist x 1; Increased time required;Verbal cues   Stand pivot 4  Minimal assistance   Additional items Assist x 1; Increased time required;Verbal cues   Additional Comments c RW   Ambulation/Elevation   Gait pattern Improper Weight shift;Decreased L stance;Decreased foot clearance; Short stride; Step to;Excessively slow   Gait Assistance 4  Minimal assist   Additional items Assist x 1;Verbal cues   Assistive Device Rolling walker   Distance 3'+15'x2   Balance   Static Sitting Fair   Dynamic Sitting Fair -   Static Standing Fair -   Dynamic Standing Poor +   Ambulatory Poor +   Endurance Deficit   Endurance Deficit Yes   Endurance Deficit Description pain   Activity Tolerance   Activity Tolerance Patient limited by pain   Nurse Made Aware yes-cleared to mobilize   Assessment   Prognosis Good   Problem List Decreased strength;Decreased range of motion;Decreased endurance; Impaired balance;Decreased mobility;Pain;Orthopedic restrictions   Assessment Pt is a 62 y o  female [facility] for scheduled L TKA c screw removal of L proximal tibia-WBAT and activity day 0 orders  Pt has the following comorbidities which affect their treatment: h/o alcoholism, anemia, anxiety, arthritis, ACL tear and repair, COPD, depression as well as personal factors including stairs to access home   Pt has a high complexity clinical presentation due to Ongoing medical management for primary dx, Increased reliance on more restrictive AD compared to baseline, Decreased activity tolerance compared to baseline, Fall risk, Increased assistance needed from caregiver at current time, s/p post op day 0, Trending lab values, Continuous pulse oximetry monitoring  , and PMH  PT was consulted to evaluate pt's functional mobility and discharge needs  Upon evaluation, patient required S for bed mobility, minAx1 for STS transfers, minAx1 for ambulation  Pt's functional impairments include: impaired strength, balance, endurance, activity tolerance, and mobility, and elevated pain  At conclusion of eval, pt remained seated in chair with chair alarm, phone, call bell, and all other personal needs within reach  Pt would benefit from skilled PT to address their functional mobility limitations  The patient's AM-PAC Basic Mobility Inpatient Short Form Raw Score is 18  A Raw score of greater than 16 suggests the patient may benefit from discharge to home  Please also refer to the recommendation of the Physical Therapist for safe discharge planning  D/C recommendations are home with OPPT pending further progress with PT  Goals   Patient Goals to go home   STG Expiration Date 12/28/22   Short Term Goal #1 In 10-14 days, pt will: 1) perform bed mobility with mod I to promote functional independence and decrease caregiver burden  2) perform transfers to<>from all surfaces with S to promote safety and independence  3) ambulate 150' with S and least restrictive device to promote safe return to home  4) negotiate 3 stairs with S to promote safe return to home  5) improve balance grades by 1/2 to promote safety with functional mobility  6) improve strength grades by 1/2 to promote safety with functional mobility  PT Treatment Day 0   Plan   Treatment/Interventions Functional transfer training;LE strengthening/ROM; Elevations; Therapeutic exercise; Endurance training;Patient/family training;Equipment eval/education; Bed mobility;Gait training;Spoke to nursing;OT   PT Frequency Twice a day   Recommendation   PT Discharge Recommendation (S)  Home with outpatient rehabilitation  (pending progress with PT)   Equipment Recommended Mallory [address] 435   Turning in Bed Without Bedrails 3   Lying on Back to Sitting on Edge of Flat Bed 3   Moving Bed to Chair 3   Standing Up From Chair 3   Walk in Room 3   Climb 3-5 Stairs 3   Basic Mobility Inpatient Raw Score 18   Basic Mobility Standardized Score 41 05   Highest Level Of Mobility   -HLM Goal 6: Walk 10 steps or more   JH-HLM Achieved 7: Walk 25 feet or more   Modified [place] Scale   Modified Marga Scale 4   Vasyl Causey, PT, DPT

## 2022-12-14 NOTE — OP NOTE
OPERATIVE REPORT  PATIENT NAME: Richard Favre    :  1965  MRN: [id number]  Pt Location: [facility] OR ROOM 18    SURGERY DATE: 2022    Surgeon(s) and Role:     * Regulo Menjivar MD - Primary     * Armen De La Rosa PA-C - Assisting     * Lico Husain MD - Assisting    Preop Diagnosis:  Primary osteoarthritis of left knee [M17 12]    Post-Op Diagnosis Codes:     * Primary osteoarthritis of left knee [M17 12]    Procedure(s) (LRB):  ARTHROPLASTY KNEE TOTAL W ROBOT with screw removal left proximal tibia (Left)    Specimen(s):  * No specimens in log *    Estimated Blood Loss:   Minimal    Drains:  Urethral Catheter Latex 16 Fr  (Active)   Number of days: 0       Anesthesia Type:   Spinal w/ Femoral Nerve Block    Operative Indications:  Primary osteoarthritis of left knee [M17 12]      Operative Findings:  depuy attune   Femur-4   Poly-7   Tibia-4   [id number]    Complications:   None    Procedure and Technique: Following the induction medical level of spinal anesthesia, Rivers catheter was sterilely introduces patient's bladder  Antibiotics administered  Left thigh was then fitted with a thigh-high tourniquet  The left lower extremity in a normal sterile prep drape  The left lower extremity was exsanguinated gravity, tourniquet inflated to 300 mmHg  A midline knee incision was created the inflection  Her inferior knee scar was incorporated into this  Full-thickness flaps were raised again to the extensor mechanism  A medial arthrotomy was created up to the knee joint  2 half pins were placed proximal tibia, 2 half pins were placed on the distal femur  In doing so, modules were created  The arrays were then attached to the modules  Check was in the proximal tibia distal femur  The knee was then registered  The large fragment screw and washer was subsequently removed from the proximal medial tibia  The surgery was planned on the computer, the plan was finalized    The robot was docked  The first maneuver involve the distal femoral cut  The anterior posterior cuts were made next followed by the chamfer cuts  The box cut complete the process  Proximal tibia cut was made next  Care was taken preserve degenerative medial condyle, lateral condyle, and posterior structures during these maneuvers  The remnant medial lateral discectomies were performed  Trials were inserted, the knee was taken through range of motion, found to be A full extension, good flexion, no mid flexion valgus instability  The patella was then resurfaced while utilizing manufacture prescription, is found to be a size 35 mm button  Trial components removed and the knee was prepared for insertion of cemented components  The cemented tibia, cemented femur, trial poly-, and cemented patella in place  Excess cement was removed, the knee was brought into extension  The cement was cured  Trial ball was taken out, the knee was packed  The tourniquet is deflated, and hemostasis was secured  The insert polyethylene was then snapped into position  The knee was taken through final range of motion, found to be A full extension, good flexion, no mid flexion valgus instability, next patella tracking  Satisfied with the extent of surgery, the wounds were flushed with saline closed  Betadine soak was initiated  A drain was placed deep brought of his several other stab incision  The arthrotomy was closed with number Vicryl suture  The subcu tissue closed with 2-0 Vicryl suture the skin was closed with staples  Sterile dressings were applied    She was then transferred to recovery in stable condition plans to include physical therapy weight-bear tolerance, she will require DVT prophylaxis with Lovenox   I was present for the entire procedure    Patient Disposition:  PACU         SIGNATURE: Betty Guerrero MD  DATE: December 14, 2022  TIME: 9:51 AM

## 2022-12-14 NOTE — ANESTHESIA PROCEDURE NOTES
Spinal Block    Patient location during procedure: OR  Start time: 12/14/2022 8:21 AM  Reason for block: primary anesthetic  Staffing  Performed: CRNA   Anesthesiologist: Mirta Georges MD  Resident/CRNA: Bertha Coronado CRNA  Preanesthetic Checklist  Completed: patient identified, IV checked, site marked, risks and benefits discussed, surgical consent, monitors and equipment checked, pre-op evaluation and timeout performed  Spinal Block  Patient position: sitting  Prep: ChloraPrep  Patient monitoring: cardiac monitor and frequent blood pressure checks  Approach: midline  Location: L3-4  Injection technique: single-shot  Needle  Needle type: pencil-tip   Needle gauge: 25 G  Needle length: 10 cm  Assessment  Sensory level: T4  Events: cerebrospinal fluid  Injection Assessment:  negative aspiration for heme, no paresthesia on injection and positive aspiration for clear CSF    Post-procedure:  adhesive bandage applied, pressure dressing applied, secured with tape, site cleaned and sterile dressing applied

## 2022-12-14 NOTE — ANESTHESIA POSTPROCEDURE EVALUATION
Post-Op Assessment Note    CV Status:  Stable  Pain Score: 0    Pain management: adequate     Mental Status:  Arousable   Hydration Status:  Stable   PONV Controlled:  None   Airway Patency:  Patent      Post Op Vitals Reviewed: Yes      Staff: Anesthesiologist, CRNA         No notable events documented      BP   105/67   Temp 98   Pulse 63   Resp 16   SpO2 98 RA

## 2022-12-14 NOTE — PLAN OF CARE
Problem: PHYSICAL THERAPY ADULT  Goal: Performs mobility at highest level of function for planned discharge setting  See evaluation for individualized goals  Description: Treatment/Interventions: Functional transfer training, LE strengthening/ROM, Elevations, Therapeutic exercise, Endurance training, Patient/family training, Equipment eval/education, Bed mobility, Gait training, Spoke to nursing, OT  Equipment Recommended: Megan Bowling       See flowsheet documentation for full assessment, interventions and recommendations  Outcome: Progressing  Note: Prognosis: Good  Problem List: Decreased strength, Decreased range of motion, Decreased endurance, Impaired balance, Decreased mobility, Pain, Orthopedic restrictions  Assessment: Pt is a 62 y o  female admitted to [facility] on 12/14/2022 for scheduled L TKA c screw removal of L proximal tibia-WBAT and activity day 0 orders  Pt has the following comorbidities which affect their treatment: h/o alcoholism, anemia, anxiety, arthritis, ACL tear and repair, COPD, depression as well as personal factors including stairs to access home  Pt has a high complexity clinical presentation due to Ongoing medical management for primary dx, Increased reliance on more restrictive AD compared to baseline, Decreased activity tolerance compared to baseline, Fall risk, Increased assistance needed from caregiver at current time, s/p post op day 0, Trending lab values, Continuous pulse oximetry monitoring  , and PMH  PT was consulted to evaluate pt's functional mobility and discharge needs  Upon evaluation, patient required S for bed mobility, minAx1 for STS transfers, minAx1 for ambulation  Pt's functional impairments include: impaired strength, balance, endurance, activity tolerance, and mobility, and elevated pain  At conclusion of eval, pt remained seated in chair with chair alarm, phone, call bell, and all other personal needs within reach   Pt would benefit from skilled PT to address their functional mobility limitations  The patient's AM-PAC Basic Mobility Inpatient Short Form Raw Score is 18  A Raw score of greater than 16 suggests the patient may benefit from discharge to home  Please also refer to the recommendation of the Physical Therapist for safe discharge planning  D/C recommendations are home with OPPT pending further progress with PT  PT Discharge Recommendation: (S) Home with outpatient rehabilitation (pending progress with PT)    See flowsheet documentation for full assessment

## 2022-12-14 NOTE — ANESTHESIA PROCEDURE NOTES
Peripheral Block    Patient location during procedure: holding area  Start time: 12/14/2022 8:00 AM  Reason for block: at surgeon's request and post-op pain management  Staffing  Performed: Anesthesiologist   Anesthesiologist: She Shaw MD  Resident/CRNA: Kiera Reeves CRNA  Preanesthetic Checklist  Completed: patient identified, IV checked, site marked, risks and benefits discussed, surgical consent, monitors and equipment checked, pre-op evaluation and timeout performed  Peripheral Block  Patient position: supine  Prep: ChloraPrep  Patient monitoring: heart rate, continuous pulse ox and frequent blood pressure checks  Block type: ipack block  Laterality: left  Injection technique: single-shot  Procedures: ultrasound guided, Ultrasound guidance required for the procedure to increase accuracy and safety of medication placement and decrease risk of complications  Ultrasound permanent image savedropivacaine (NAROPIN) 0 2% - Perineural   20 mL - 12/14/2022 8:00:00 AM  Needle  Needle type: Stimuplex   Needle length: 10 cm  Needle localization: ultrasound guidance  Assessment  Injection assessment: incremental injection and negative aspiration for heme  Post-procedure:  site cleaned  patient tolerated the procedure well with no immediate complications  Additional Notes  No more than 5 mL injected without aspiration beforehand   Aspiration consistently negative for heme

## 2022-12-15 ENCOUNTER — TELEPHONE (OUTPATIENT)
Dept: OBGYN CLINIC | Facility: CLINIC | Age: 57
End: 2022-12-15

## 2022-12-15 VITALS
OXYGEN SATURATION: 94 % | SYSTOLIC BLOOD PRESSURE: 109 MMHG | WEIGHT: 123 LBS | BODY MASS INDEX: 21.79 KG/M2 | HEART RATE: 58 BPM | TEMPERATURE: 98.9 F | RESPIRATION RATE: 16 BRPM | DIASTOLIC BLOOD PRESSURE: 57 MMHG | HEIGHT: 63 IN

## 2022-12-15 DIAGNOSIS — Z96.659 STATUS POST TOTAL KNEE REPLACEMENT, UNSPECIFIED LATERALITY: ICD-10-CM

## 2022-12-15 DIAGNOSIS — Z96.659 STATUS POST TOTAL KNEE REPLACEMENT, UNSPECIFIED LATERALITY: Primary | ICD-10-CM

## 2022-12-15 LAB
ANION GAP SERPL CALCULATED.3IONS-SCNC: 2 MMOL/L (ref 4–13)
BUN SERPL-MCNC: 18 MG/DL (ref 5–25)
CALCIUM SERPL-MCNC: 8.7 MG/DL (ref 8.3–10.1)
CHLORIDE SERPL-SCNC: 106 MMOL/L (ref 96–108)
CO2 SERPL-SCNC: 31 MMOL/L (ref 21–32)
CREAT SERPL-MCNC: 0.66 MG/DL (ref 0.6–1.3)
DME PARACHUTE DELIVERY DATE ACTUAL: NORMAL
DME PARACHUTE DELIVERY DATE REQUESTED: NORMAL
DME PARACHUTE ITEM DESCRIPTION: NORMAL
DME PARACHUTE ORDER STATUS: NORMAL
DME PARACHUTE SUPPLIER NAME: NORMAL
DME PARACHUTE SUPPLIER PHONE: NORMAL
ERYTHROCYTE [DISTWIDTH] IN BLOOD BY AUTOMATED COUNT: 12.8 % (ref 11.6–15.1)
GFR SERPL CREATININE-BSD FRML MDRD: 98 ML/MIN/1.73SQ M
GLUCOSE SERPL-MCNC: 107 MG/DL (ref 65–140)
HCT VFR BLD AUTO: 35.6 % (ref 34.8–46.1)
HGB BLD-MCNC: 11.4 G/DL (ref 11.5–15.4)
MCH RBC QN AUTO: 31.4 PG (ref 26.8–34.3)
MCHC RBC AUTO-ENTMCNC: 32 G/DL (ref 31.4–37.4)
MCV RBC AUTO: 98 FL (ref 82–98)
PLATELET # BLD AUTO: 173 THOUSANDS/UL (ref 149–390)
PMV BLD AUTO: 11.4 FL (ref 8.9–12.7)
POTASSIUM SERPL-SCNC: 4.6 MMOL/L (ref 3.5–5.3)
RBC # BLD AUTO: 3.63 MILLION/UL (ref 3.81–5.12)
SODIUM SERPL-SCNC: 139 MMOL/L (ref 135–147)
WBC # BLD AUTO: 12.07 THOUSAND/UL (ref 4.31–10.16)

## 2022-12-15 RX ORDER — KETOROLAC TROMETHAMINE 30 MG/ML
30 INJECTION, SOLUTION INTRAMUSCULAR; INTRAVENOUS ONCE
Status: COMPLETED | OUTPATIENT
Start: 2022-12-15 | End: 2022-12-15

## 2022-12-15 RX ORDER — SENNOSIDES 8.6 MG
1 TABLET ORAL
Status: DISCONTINUED | OUTPATIENT
Start: 2022-12-15 | End: 2022-12-15 | Stop reason: HOSPADM

## 2022-12-15 RX ORDER — DIAZEPAM 5 MG/1
5 TABLET ORAL EVERY 8 HOURS PRN
Status: DISCONTINUED | OUTPATIENT
Start: 2022-12-15 | End: 2022-12-15 | Stop reason: HOSPADM

## 2022-12-15 RX ORDER — HYDROCODONE BITARTRATE AND ACETAMINOPHEN 5; 325 MG/1; MG/1
1 TABLET ORAL EVERY 6 HOURS PRN
Status: DISCONTINUED | OUTPATIENT
Start: 2022-12-15 | End: 2022-12-15

## 2022-12-15 RX ORDER — OXYCODONE HYDROCHLORIDE 5 MG/1
5 TABLET ORAL EVERY 4 HOURS PRN
Qty: 28 TABLET | Refills: 0 | Status: SHIPPED | OUTPATIENT
Start: 2022-12-15 | End: 2022-12-15 | Stop reason: SDUPTHER

## 2022-12-15 RX ORDER — METHOCARBAMOL 500 MG/1
500 TABLET, FILM COATED ORAL 4 TIMES DAILY
Qty: 28 TABLET | Refills: 0 | Status: SHIPPED | OUTPATIENT
Start: 2022-12-15 | End: 2022-12-21 | Stop reason: SDUPTHER

## 2022-12-15 RX ORDER — OXYCODONE HYDROCHLORIDE 5 MG/1
5 TABLET ORAL EVERY 4 HOURS PRN
Qty: 28 TABLET | Refills: 0 | Status: SHIPPED | OUTPATIENT
Start: 2022-12-15 | End: 2022-12-27

## 2022-12-15 RX ORDER — DIAZEPAM 5 MG/1
5 TABLET ORAL ONCE
Status: COMPLETED | OUTPATIENT
Start: 2022-12-15 | End: 2022-12-15

## 2022-12-15 RX ORDER — NALOXONE HYDROCHLORIDE 4 MG/.1ML
SPRAY NASAL
Qty: 1 EACH | Refills: 0 | Status: SHIPPED | OUTPATIENT
Start: 2022-12-15 | End: 2022-12-27

## 2022-12-15 RX ORDER — ONDANSETRON 4 MG/1
4 TABLET, FILM COATED ORAL EVERY 8 HOURS PRN
Qty: 20 TABLET | Refills: 0 | Status: SHIPPED | OUTPATIENT
Start: 2022-12-15

## 2022-12-15 RX ORDER — HYDROCODONE BITARTRATE AND ACETAMINOPHEN 5; 325 MG/1; MG/1
1 TABLET ORAL EVERY 6 HOURS PRN
Qty: 40 TABLET | Refills: 0 | Status: SHIPPED | OUTPATIENT
Start: 2022-12-15 | End: 2022-12-16 | Stop reason: SDUPTHER

## 2022-12-15 RX ORDER — HYDROCODONE BITARTRATE AND ACETAMINOPHEN 5; 325 MG/1; MG/1
1 TABLET ORAL EVERY 6 HOURS PRN
Status: DISCONTINUED | OUTPATIENT
Start: 2022-12-15 | End: 2022-12-15 | Stop reason: HOSPADM

## 2022-12-15 RX ADMIN — IPRATROPIUM BROMIDE 0.5 MG: 0.5 SOLUTION RESPIRATORY (INHALATION) at 07:41

## 2022-12-15 RX ADMIN — IRON SUCROSE 200 MG: 20 INJECTION, SOLUTION INTRAVENOUS at 10:34

## 2022-12-15 RX ADMIN — HYDROCODONE BITARTRATE AND ACETAMINOPHEN 1 TABLET: 5; 325 TABLET ORAL at 03:38

## 2022-12-15 RX ADMIN — HYDROMORPHONE HYDROCHLORIDE 0.5 MG: 1 INJECTION, SOLUTION INTRAMUSCULAR; INTRAVENOUS; SUBCUTANEOUS at 04:20

## 2022-12-15 RX ADMIN — ONDANSETRON 4 MG: 2 INJECTION INTRAMUSCULAR; INTRAVENOUS at 04:20

## 2022-12-15 RX ADMIN — NICOTINE POLACRILEX 2 MG: 2 GUM, CHEWING BUCCAL at 10:33

## 2022-12-15 RX ADMIN — HYDROCODONE BITARTRATE AND ACETAMINOPHEN 1 TABLET: 5; 325 TABLET ORAL at 11:51

## 2022-12-15 RX ADMIN — DOCUSATE SODIUM 100 MG: 100 CAPSULE, LIQUID FILLED ORAL at 10:34

## 2022-12-15 RX ADMIN — ONDANSETRON 4 MG: 2 INJECTION INTRAMUSCULAR; INTRAVENOUS at 10:35

## 2022-12-15 RX ADMIN — DIAZEPAM 5 MG: 5 TABLET ORAL at 10:34

## 2022-12-15 RX ADMIN — KETOROLAC TROMETHAMINE 30 MG: 30 INJECTION, SOLUTION INTRAMUSCULAR; INTRAVENOUS at 10:34

## 2022-12-15 NOTE — PROGRESS NOTES
Peripheral Nerve Block Follow-up Note - Acute Pain Service    Gayle Montaño 62 y o  female MRN: [id number]  Unit/Bed#: -01 Encounter: [id number]      Assessment:   Active Problems:    COPD (chronic obstructive pulmonary disease) ([facility] [address] )    Gayle Montaño is a 62y o  year old female s/p left sided TKA  The patient is in a lot of pain this morning 9/10 and is extremely anxious and as she was very nauseous from taking opioids  She expressed not feeling comfortable going home feeling this way  The patient was given IV toradol, PO valium and percocet, on reassessment she felt her pain was much improved 6/10 and she felt comfortable going home  Her  was present in the room throughout discussions  Plan:   - Left adductor block has resolved appropriately with no residual deficits  - Patient unable to tolerate oxycodone switched to Percocet 5mg -325 mg q6 hrs PRN for moderate and severe pain, continue on d/c    - One time dose of IV toradol 30 mg   - One time dose of 5 mg PO valium    - Multimodal analgesia with:  - PRN tylenol 650 mg q6 hrs     APS will sign off at this time  Thank you for the consult  All opioids and other analgesics to be written at discretion of primary team  Please contact Acute Pain Service - [facility] via [employer] from [phone] with additional questions or concerns  See Neymar or Micky for additional contacts and after hours information      Pain History  Current pain location(s): left knee   Pain Scale:   9/10  Quality: achy, throbbing, constant   24 hour history: s p L TKA with adductor single shot block     Opioid requirement previous 24 hours:   100 mcg fentanyl   1 percocet   1 mg IV dilaudid   30 mg PO oxycodone     Meds/Allergies   all current active meds have been reviewed    Allergies   Allergen Reactions   • Percocet [Oxycodone-Acetaminophen] GI Intolerance       Objective     Temp:  [97 2 °F (36 2 °C)-98 9 °F (37 2 °C)] 98 9 °F (37 2 °C)  HR:  [52-89] 58  Resp: [14-17] 16  BP: (100-129)/(52-92) 109/57    Physical Exam  Constitutional:       Appearance: Normal appearance  HENT:      Head: Normocephalic and atraumatic  Mouth/Throat:      Mouth: Mucous membranes are moist       Pharynx: Oropharynx is clear  Eyes:      Extraocular Movements: Extraocular movements intact  Pupils: Pupils are equal, round, and reactive to light  Cardiovascular:      Rate and Rhythm: Normal rate and regular rhythm  Pulses: Normal pulses  Heart sounds: Normal heart sounds  Pulmonary:      Effort: Pulmonary effort is normal       Breath sounds: Normal breath sounds  Abdominal:      General: Bowel sounds are normal       Palpations: Abdomen is soft  Musculoskeletal:         General: Normal range of motion  Cervical back: Normal range of motion  Comments: Limited ROM of LLE    Skin:     General: Skin is warm and dry  Capillary Refill: Capillary refill takes less than 2 seconds  Comments: Ace wrap present on left knee    Neurological:      General: No focal deficit present  Mental Status: She is alert and oriented to person, place, and time  Psychiatric:         Mood and Affect: Mood is anxious  Behavior: Behavior normal          Cognition and Memory: Cognition normal            Lab Results:   Results from last 7 days   Lab Units 12/15/22  0542   WBC Thousand/uL 12 07*   HEMOGLOBIN g/dL 11 4*   HEMATOCRIT % 35 6   PLATELETS Thousands/uL 173      Results from last 7 days   Lab Units 12/15/22  0542   POTASSIUM mmol/L 4 6   CHLORIDE mmol/L 106   CO2 mmol/L 31   BUN mg/dL 18   CREATININE mg/dL 0 66   CALCIUM mg/dL 8 7       Imaging Studies: I have personally reviewed pertinent reports  EKG, Pathology, and Other Studies: I have personally reviewed pertinent reports  Counseling / Coordination of Care  Total floor / unit time spent today 25 minutes   Greater than 50% of total time was spent with the patient and / or family counseling and / or coordination of care  A description of the counseling / coordination of care: s/p TKA nerve block f/u     Please note that the APS provides consultative services regarding pain management only  With the exception of ketamine, peripheral nerve catheters, and epidural infusions (and except when indicated), final decisions regarding starting or changing doses of analgesic medications are at the discretion of the consulting service  Off hours consultation and/or medication management is generally not available      JULIANNE Luciano  Acute Pain Service

## 2022-12-15 NOTE — OCCUPATIONAL THERAPY NOTE
Occupational Therapy Evaluation     Patient Name: Nela Lorenz  NNFAN'Y Date: 12/15/2022  Problem List  Principal Problem:    Primary osteoarthritis of left knee  Active Problems:    COPD (chronic obstructive pulmonary disease) ([facility] [address] )    Past Medical History  Past Medical History:   Diagnosis Date    Alcoholism ([facility] [address] ) 95/18    recovering alcoholic, drug addict/compulsive over-eater    Anemia     Anxiety     Arthritis     Callus     Complete Tear of the Anterior cruciate ligament tear of the knee     COPD (chronic obstructive pulmonary disease) ([facility] [address] )     Depression     Difficulty walking     Exposure to hepatitis B     Resolved: 17    History of cholesteatoma     right ear    History of COVID-19 2022    mild s/s    History of prior pregnancies     A1    Irritable bowel syndrome     Lung disease     Papanicolaou smear 2010    NEG    Plantar fasciitis     PONV (postoperative nausea and vomiting)     Reactive airway disease     Last assessed: 16     Past Surgical History  Past Surgical History:   Procedure Laterality Date    BREAST CYST ASPIRATION Right     COLONOSCOPY      CYSTECTOMY Left     benign cystectomy below breast    DILATION AND CURETTAGE OF UTERUS      FACIAL RECONSTRUCTION SURGERY      FOOT SURGERY Right     endoscopic plantar fasciotomy    GUM SURGERY  2019    with bone graft    KNEE ARTHROSCOPY Left 1997    menisectomy    KNEE ARTHROSCOPY Left 10/1997    ACL repair    MAMMO (HISTORICAL) Bilateral 2010    benign    MYRINGOTOMY W/ TUBES Right 2019    Procedure: MYRINGOTOMY W/ INSERTION VENTILATION TUBE EAR;  Surgeon: Jhon Johnson DO;  Location:  MAIN OR;  Service: ENT    MN REPAIR OF Jaqueline Push Right 2022    Procedure: REPAIR HAMMERTOE 2nd;  Surgeon: Fransisco Dominguez DPM;  Location: [address] [place] [address] MAIN OR;  Service: Podiatry    MN SHLDR ARTHROSCOP,SURG,W/ROTAT CUFF REPR Left 2018    Procedure: SHOULDER ARTHROSCOPY ROTATOR CUFF REPAIR; SUBACROMIAL DECOMPRESSION;  Surgeon: Joey Orellana MD;  [facility] MA[facility];  Service: Orthopedics    IN TOTAL KNEE ARTHROPLASTY Left 12/14/2022    Procedure: ARTHROPLASTY KNEE TOTAL W ROBOT with screw removal left proximal tibia; Surgeon: Yesenia Juarez MD;  Location: [facility] MAIN OR;  Service: Orthopedics    IN TYMPANOPLAS/MASTOIDEC,INTACT WALL Right 03/01/2019    Procedure: TYMPANOPLASTY;  Surgeon: Bonnie Domingo DO;  Location: [facility] MAIN OR;  Service: ENT    SHOULDER SURGERY      TONSILLECTOMY AND ADENOIDECTOMY      TUBAL LIGATION      VAGINAL DELIVERY  1987    WISDOM TOOTH EXTRACTION           12/15/22 0815   OT Last Visit   OT Visit Date 12/15/22   Note Type   Note type Evaluation   Pain Assessment   Pain Assessment Tool 0-10   Pain Score 6   Pain Location/Orientation Orientation: Left; Location: Knee   [facility] Pain Intervention(s) Repositioned; Ambulation/increased activity; Emotional support;Relaxation technique   Restrictions/Precautions   Weight Bearing Precautions Per Order Yes   RUE Weight Bearing Per Order WBAT   LUE Weight Bearing Per Order WBAT   RLE Weight Bearing Per Order WBAT   LLE Weight Bearing Per Order WBAT   Home Living   Type of [address] One level;Stairs to enter with rails   Bathroom Shower/Tub Tub/shower unit   Bathroom Toilet Standard   Bathroom Equipment Grab bars in shower; Shower chair   Home Equipment Walker   Prior Function   Level of [place] Independent with ADLs; Independent with functional mobility; Independent with IADLS   Lives With Spouse   Receives Help From Family   IADLs Independent with driving; Independent with meal prep; Independent with medication management   Falls in the last 6 months 1 to 4   Lifestyle   Autonomy I adls and mobility - i iadls - shares homemaking with spouse   Reciprocal Relationships supportive spouse - pt reports he is a retired RN and is able to provide assist prn   Service to Others not working - 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   Intrinsic Gratification mostly sedentary PTA   General   Family/Caregiver Present No   Subjective   Subjective c/o pain   ADL   Eating Assistance 7  Independent   Grooming Assistance 5  Supervision/Setup   UB Bathing Assistance 5  Supervision/Setup   LB Bathing Assistance 5  Supervision/Setup   UB Dressing Assistance 5  Supervision/Setup   LB Dressing Assistance 5  Supervision/Setup   Toileting Assistance  5  Supervision/Setup   Additional Comments pt able to identify LB dressing techniques  - reports spouse is able to provide assist with LB adls PRN   Bed Mobility   Additional Comments oob in chair   Transfers   Sit to Stand 5  Supervision   Stand to Sit 5  Supervision   Functional Mobility   Functional Mobility 5  Supervision   Additional items Rolling walker   Balance   Static Sitting Fair +   Dynamic Sitting Fair   Static Standing Fair   Dynamic Standing [address], & 5 -   Activity Tolerance   Activity Tolerance Patient limited by fatigue;Patient limited by pain   RUE Assessment   RUE Assessment WFL   LUE Assessment   LUE Assessment WFL   Cognition   Overall Cognitive Status WFL   Assessment   Limitation Decreased ADL status; Decreased endurance;Decreased self-care trans;Decreased high-level ADLs   Prognosis Good   Assessment Pt is a 62 y o  female who was admitted to [facility] on 12/14/2022 with Primary osteoarthritis of left knee s/p L TKA   Pt's problem list also includes PMH of COPD and anemia, anxiety, arthritis, depression, h/o covid-19, IBS, plantar fascitis  At baseline pt was completing adls and mobility independently - I iadls - shares homemaking with s/o  Pt lives with spouse in 1 story home with 2+1 CARLOS  Currently pt requires sba for overall ADLS and sba for functional mobility/transfers   Pt currently presents with impairments in the following categories -steps to enter environment, difficulty performing ADLS, difficulty performing IADLS  and environment activity tolerance, endurance and standing balance/tolerance  These impairments, as well as pt's fatigue, pain, orthopedic restricitions , WBS , risk for falls and home environment  limit pt's ability to safely engage in all baseline areas of occupation, includingbathing, dressing, functional mobility/transfers, community mobility, laundry , driving, house maintenance, meal prep, cleaning, social participation  and leisure activities however has supportive spouse who is able to provide assist prn - will need BSC for home -  From OT standpoint, recommend home with family support upon D/C  No immediate OT needs indicated at this time- ok for d/c home when medically cleared- d/c from caseload   Goals   Patient Goals go home   Plan   OT Frequency Eval only   Recommendation   OT Discharge Recommendation No rehabilitation needs   Equipment Recommended Bedside commode   Commode Type Standard   AM-PAC Daily Activity Inpatient   Lower Body Dressing 3   Bathing 3   Toileting 4   Upper Body Dressing 4   Grooming 4   Eating 4   Daily Activity Raw Score 22   Daily Activity Standardized Score (Calc for Raw Score >=11) 47  1   AM-PAC Applied Cognition Inpatient   Following a Speech/Presentation 4   Understanding Ordinary Conversation 4   Taking Medications 4   Remembering Where Things Are Placed or Put Away 4   Remembering List of 4-5 Errands 4   Taking Care of Complicated Tasks 4   Applied Cognition Raw Score 24   Applied Cognition Standardized Score 62 21   End of Consult   Education Provided Yes   Patient Position at End of Consult Bedside chair; All needs within reach   Nurse Communication Nurse aware of consult     The patient's raw score on the AM-PAC Daily Activity inpatient short form is 22, standardized score is 47 1, greater than 39 4  Patients at this level are likely to benefit from discharge to home  Please refer to the recommendation of the Occupational Therapist for safe discharge planning        [facility]

## 2022-12-15 NOTE — PROGRESS NOTES
Internal Medicine Progress Note  Patient: Brock Guerra  Age/sex: 62 y o  female  Medical Record #: [id number]      ASSESSMENT/PLAN: (Interval History)  Brock Guerra is seen and examined and management for following issues:    Status Post left Total KNEE ARTHROPLASTY with screw removal to proximal Lt tibia  • Pain controlled  • Continue encourage incentive spirometry; monitor fever curve  • DVT prophylaxis in place and reviewed  Results from last 7 days   Lab Units 12/15/22  0542   WBC Thousand/uL 12 07*   HEMOGLOBIN g/dL 11 4*   HEMATOCRIT % 35 6   PLATELETS Thousands/uL 173   •     Operative acute blood loss anemia  · Decrease in hgb levels from preop labs results; suspect due to post operation extravasation losses along with potential dilutional effects of fluids  · Initiate surgery optimization venofer protocol/transfusion  · Transfuse PRBC if hgb less then 8 0 (per ortho protocol)  or symptomatic  · Monitor follow up CBC post intervention to trend effect    COPD  • Continue inhalers as prescribed  • Monitor post op respiratory status     Tobacco use  • Recommend cessation  • Requesting nicotine gum     Anxiety  • Takes ativan as needed  • Would avoid with use of narcotics if possible    Leukocytosis  · Mild  Likely reactive  No signs of infx  The above assessment and plan was reviewed and updated as determined by my evaluation of the patient on 12/15/2022      Labs:   Results from last 7 days   Lab Units 12/15/22  0542   WBC Thousand/uL 12 07*   HEMOGLOBIN g/dL 11 4*   HEMATOCRIT % 35 6   PLATELETS Thousands/uL 173     Results from last 7 days   Lab Units 12/15/22  0542   SODIUM mmol/L 139   POTASSIUM mmol/L 4 6   CHLORIDE mmol/L 106   CO2 mmol/L 31   BUN mg/dL 18   CREATININE mg/dL 0 66   CALCIUM mg/dL 8 7             Results from last 7 days   Lab Units 12/14/22  1019   POC GLUCOSE mg/dl 88       Review of Scheduled Meds:  Current Facility-Administered Medications   Medication Dose Route Frequency Provider Last Rate   • albuterol  2 puff Inhalation Q4H PRN Shannan Rangel MD     • docusate sodium  100 mg Oral BID Shannan Rangel MD     • enoxaparin  40 mg Subcutaneous Q24H [facility] Shannan Rangel MD     • hydrALAZINE  25 mg Oral Q8H PRN Rich Senna, CRNP     • HYDROcodone-acetaminophen  1 tablet Oral Q6H PRN Archana León MD     • HYDROmorphone  0 5 mg Intravenous Q2H PRN Shannan Rangel MD     • ipratropium  0 5 mg Nebulization BID Dejah Milligan MD     • lactated ringers  1,000 mL Intravenous Once PRN Shannan Rangel MD      And   • lactated ringers  1,000 mL Intravenous Once PRN Shannan Rangel MD     • lactated ringers  50 mL/hr Intravenous Continuous Shannan Rangel MD Stopped (12/14/22 1035)   • lactated ringers  125 mL/hr Intravenous Continuous Shannan Rangel  mL/hr (12/14/22 0054)   • LORazepam  0 5 mg Oral Daily PRN Shannan Rangel MD     • melatonin  3 mg Oral HS Shannan Rangel MD     • nicotine polacrilex  2 mg Oral Q2H PRN Rich Senna, CRNP     • ondansetron  4 mg Intravenous Q6H PRN Rich Senna, CRNP     • senna  1 tablet Oral HS Archana León MD     • sodium chloride  1,000 mL Intravenous Once PRN Shannan Rangel MD      And   • sodium chloride  1,000 mL Intravenous Once PRN Shannan Rangel MD         Subjective/ HPI: Patient seen and examined  Patients overnight issues or events were reviewed with nursing or staff during rounds or morning huddle session  New or overnight issues include the following:     Pt seen in her room with her  at bedside  She reports nausea and is requesting Zofran  She reports increased pain with therapy  She is upset about pain medications only being available for 3 days at the pharmacy  Spoke with Ortho and new rxs are being sent  Pt is aware that new prescriptions are being sent  She denies any other complaints      ROS:   A 10 point ROS was performed; negative except as noted above        Imaging:     No orders to display       *Labs /Radiology studies Reviewed  *Medications  reviewed and reconciled as needed  *Please refer to order section for additional ordered labs studies  *Case discussed with primary attending during morning huddle case rounds    Physical Examination:  Vitals:   Vitals:    12/14/22 2327 12/15/22 0252 12/15/22 0741 12/15/22 0758   BP: 124/63 121/62  109/57   Pulse: 62 74  58   Resp:    16   Temp: 98 5 °F (36 9 °C) 98 6 °F (37 °C)  98 9 °F (37 2 °C)   TempSrc:       SpO2: 97% 96% 96% 94%   Weight:       Height:           General Appearance: no distress, conversive  HEENT: PERRLA, conjuctiva normal; oropharynx clear; mucous membranes moist;   Neck:  Supple, no lymphadenopathy or thyromegaly  Lungs: CTA, normal respiratory effort, no retractions, expiratory effort normal  CV: regular rate and rhythm , PMI normal   ABD: soft non tender, no masses , no hepatic or splenomegaly  EXT: DP pulses intact, no lymphadenopathy, no edema; Lt knee surgical dressing in place  Skin: normal turgor, normal texture, no rash  Psych: affect normal, mood normal  Neuro: AAOx3    : veronica removed  Pt voiding  The above physical exam was reviewed and updated as determined by my evaluation of the patient on 12/15/2022  Invasive Devices     Peripheral Intravenous Line  Duration           Peripheral IV Dorsal (posterior); Right Hand -- days          Epidural Line  Duration           Nerve Block Catheter 01/31/18 1779 days          Drain  Duration           Closed/Suction Drain Anterior; Left Knee Accordion 10 Fr  <1 day                   VTE Pharmacologic Prophylaxis: Enoxaparin  Code Status: Level 1 - Full Code  Current Length of Stay: 0 day(s)      Total floor / unit time spent today 30 minutes  Coordination of patient's care was performed in conjunction with primary service   Time invested included review of patient's labs, vitals, and management of their comorbidities with continued monitoring, examination of patient as well as answering patient questions, documenting her findings and creating progress note in electronic medical record,  ordering appropriate diagnostic testing  Medical decision making for the day was made by supervising physician unless otherwise noted in their attestation statement  ** Please Note:  voice to text software may have been used in the creation of this document   Although proof errors in transcription or interpretation are a potential of such software**

## 2022-12-15 NOTE — PHYSICAL THERAPY NOTE
PHYSICAL THERAPY NOTE          Patient Name: Cha Coon  [id number] Date: 12/15/2022       12/15/22 0843   PT Last Visit   PT Visit Date 12/15/22   Pain Assessment   Pain Assessment Tool 0-10   Pain Score 10 - Worst Possible Pain   Pain Location/Orientation Orientation: Left; Location: Knee   Patient's Stated Pain Goal No pain   Hospital Pain Intervention(s) Ambulation/increased activity; Elevated;Repositioned   Restrictions/Precautions   Weight Bearing Precautions Per Order Yes   LLE Weight Bearing Per Order WBAT   Braces or Orthoses Other (Comment)  (none)   Other Precautions Fall Risk;Pain;WBS  (hemovac x1)   General   Family/Caregiver Present No   Cognition   Overall Cognitive Status WFL   Attention Within functional limits   Orientation Level Oriented X4   Memory Within functional limits   Following Commands Follows all commands and directions without difficulty   Subjective   Subjective "I barely slept last night"   Bed Mobility   Additional Comments pt found seated in bedside chair   Transfers   Sit to Stand 5  Supervision   Additional items Assist x 1; Increased time required   Stand to Sit 5  Supervision   Additional items Assist x 1; Increased time required   Toilet transfer 5  Supervision   Additional items Assist x 1; Increased time required;Standard toilet   Ambulation/Elevation   Gait pattern Improper Weight shift; Antalgic; Forward Flexion;Decreased L stance; Short stride; Excessively slow; Step to;Decreased heel strike   Gait Assistance 5  Supervision   Additional items Assist x 1   Assistive Device Rolling walker   Distance 70ftx2   Stair Management Assistance 4  Minimal assist   Additional items Assist x 1;Verbal cues   Stair Management Technique Two rails; Alternating pattern   Number of Stairs 6   Balance   Static Sitting Fair +   Dynamic Sitting Fair   Static Standing Fair   Dynamic Standing [address],Floors 3,4, & 5 - Endurance Deficit   Endurance Deficit Yes   Endurance Deficit Description pt limited by pain   Activity Tolerance   Activity Tolerance Patient limited by pain   Nurse Made Aware pt was appropriate for evaluation per nursing   Assessment   Prognosis Good   Problem List Decreased strength;Decreased range of motion;Decreased endurance; Impaired balance;Decreased mobility;Orthopedic restrictions;Pain   Assessment Pt presents to PT with decreased standing and ambulatory balance, decreased LE strength, pain, decreased LLE mobility and overall impaired functional mobility compared to baseline  Pt was S for transfers, S for ambulation with RW, and min Ax1 for stair navigation  Pt required verbal cues for LE sequencing on stairs  Pt denied dizziness and lightheadedness throughout the session, and reported slight nausea that did not limit treatment  Overall, pt was limited by pain  Pt was left in bedside chair with call bell in reach  Pt would continue to benefit from skilled PT services during hospital stay to improve previously mentioned functional deficits  Recommend dc home with OP PT  PT will continue to follow  Barriers to Discharge None   Goals   Patient Goals to go home   STG Expiration Date 12/28/22   Short Term Goal #1 In 10-14 days, pt will: 1) perform bed mobility with mod I to promote functional independence and decrease caregiver burden  2) perform transfers to<>from all surfaces with S to promote safety and independence  3) ambulate 150' with S and least restrictive device to promote safe return to home  4) negotiate 3 stairs with S to promote safe return to home  5) improve balance grades by 1/2 to promote safety with functional mobility  6) improve strength grades by 1/2 to promote safety with functional mobility  Plan   Treatment/Interventions Functional transfer training;LE strengthening/ROM; Elevations; Therapeutic exercise; Endurance training;Patient/family training;Equipment eval/education; Bed mobility;Gait training;Spoke to nursing;OT   PT Frequency Twice a day   Recommendation   PT Discharge Recommendation Home with outpatient rehabilitation   Equipment Recommended (S)  Walker;Cane   Walker Package Recommended Wheeled walker   Tabatha Negro 435   Turning in Bed Without Bedrails 3   Lying on Back to Sitting on Edge of Flat Bed 3   Moving Bed to Chair 3   Standing Up From Chair 3   Walk in Room 3   Climb 3-5 Stairs 3   Basic Mobility Inpatient Raw Score 18   Basic Mobility Standardized Score 41 05   Highest Level Of Mobility   JH-HLM Goal 6: Walk 10 steps or more   JH-HLM Achieved 7: Walk 25 feet or more   Exercises   Heelslides Sitting;10 reps   Knee AROM Long Arc Quad Sitting;10 reps   End of Consult   Patient Position at End of Consult Bedside chair; All needs within reach   [facility], [facility]

## 2022-12-15 NOTE — TELEPHONE ENCOUNTER
Caller: [facility] w/ [facility]     Doctor: Dr Chapin     Reason for call: The medication hydrocodone-acet 5-325 mg was sent over signed by a PA  Unfortunately they can not fill the whole script when signed by PA  Asking to see if the doctor can resend the script so they can fill it fully for the patient      Call back#: [phone]

## 2022-12-15 NOTE — DISCHARGE SUMMARY
ORTHOPEDICS DISCHARGE SUMMARY  Zeina Pineda 62 y o  female MRN: [id number]  Unit/Bed#: -22    Attending Physician: Tate Lopez    Admitting diagnosis: Primary osteoarthritis of left knee [M17 12]    Discharge diagnosis: Primary osteoarthritis of left knee [M17 12]    Date of admission: 12/14/2022    Date of discharge: 12/15/22         Procedure: Left total knee arthroplasty    HPI:  This is a 62y o  year old female that presented to the office with signs and symptoms of left knee osteoarthritis  They tried and failed conservative treatment measures and wished to proceed with surgical intervention  The risks, benefits, and complications of the procedure were discussed with the patient and informed consent was obtained  Hospital Course: The patient was admitted to the hospital on 12/14/2022 and underwent an uncomplicated left total knee arthroplasty  They were transferred to the floor after a brief stay in the post-anesthesia care unit  Their pain was well managed with IV and oral pain medications  They began therapy on post operative day #1  Lovenox was also started for DVT prophylaxis 12 hours post operatively  Hemovac drain was removed on POD1  On discharge date pt was cleared by PT and the medicine team and determined to be safe for discharge  Daily discussion was had with the patient, nursing staff, orthopaedic team, and family members if present  All questions were answered to the patients satisifaction  0   Lab Value Date/Time    HGB 11 4 (L) 12/15/2022 0542    HGB 14 0 11/11/2022 0650    HGB 14 2 01/22/2022 0718    HGB 13 7 08/08/2020 0716    HGB 14 0 02/16/2019 0718    HGB 14 0 02/11/2016 0940       Greater than 2 gram drop which qualifies for diagnosis of acute blood loss anemia  Vital signs remained stable and pt was resuscitated with IVF as needed   Body mass index is 21 79 kg/m²  Discharge Instructions:   The patient was discharged weight bearing as tolerated to the left lower extremity  Lovenox will be continued for 28 days  Continue PT/OT  Take pain medications as instructed  Discharge Medications: For the complete list of discharge medications, please refer to the patient's medication reconciliation

## 2022-12-15 NOTE — PROGRESS NOTES
Progress Note - Orthopedics   Raina Cool 62 y o  female MRN: [id number]  Unit/Bed#: -01      Subjective:    62 y  o female  No acute events  Reports she has nausea with her pain medication and has been trying anti-emetic medications with some relief  Pain controlled   Denies fevers chills, CP, SOB    Labs:  0   Lab Value Date/Time    HCT 43 9 11/11/2022 0650    HCT 43 6 01/22/2022 0718    HCT 42 7 08/08/2020 0716    HCT 43 2 02/16/2019 0718    HCT 42 7 02/11/2016 0940    HGB 14 0 11/11/2022 0650    HGB 14 2 01/22/2022 0718    HGB 13 7 08/08/2020 0716    HGB 14 0 02/16/2019 0718    HGB 14 0 02/11/2016 0940    INR 0 89 11/11/2022 0650    WBC 4 48 11/11/2022 0650    WBC 5 01 01/22/2022 0718    WBC 5 3 08/08/2020 0716    WBC 5 30 02/16/2019 0718    WBC 7 1 02/11/2016 0940       Meds:    Current Facility-Administered Medications:   •  albuterol (PROVENTIL HFA,VENTOLIN HFA) inhaler 2 puff, 2 puff, Inhalation, Q4H PRN, Dom Burton MD  •  docusate sodium (COLACE) capsule 100 mg, 100 mg, Oral, BID, Dom Burton MD, 100 mg at 12/14/22 1757  •  enoxaparin (LOVENOX) subcutaneous injection 40 mg, 40 mg, Subcutaneous, Q24H [facility], Dom Burton MD, 40 mg at 12/14/22 2217  •  hydrALAZINE (APRESOLINE) tablet 25 mg, 25 mg, Oral, Q8H PRN, JULIANNE Wolf  •  HYDROcodone-acetaminophen (NORCO) 5-325 mg per tablet 1 tablet, 1 tablet, Oral, Q6H PRN, Danish Omer MD, 1 tablet at 12/15/22 0887  •  HYDROmorphone (DILAUDID) injection 0 5 mg, 0 5 mg, Intravenous, Q2H PRN, Dom Burton MD, 0 5 mg at 12/15/22 5012  •  ipratropium (ATROVENT) 0 02 % inhalation solution 0 5 mg, 0 5 mg, Nebulization, BID, Nestor Batres MD, 0 5 mg at 12/14/22 2016  •  lactated ringers bolus 1,000 mL, 1,000 mL, Intravenous, Once PRN **AND** lactated ringers bolus 1,000 mL, 1,000 mL, Intravenous, Once PRN, Dom Burton MD  •  lactated ringers infusion, 50 mL/hr, Intravenous, Continuous, Kira Antony de Iram Dunham MD, [address] at 12/14/22 1035  •  lactated ringers infusion, 125 mL/hr, Intravenous, Continuous, Dom Burton MD, Last Rate: 125 mL/hr at 12/14/22 2324, 125 mL/hr at 12/14/22 2324  •  LORazepam (ATIVAN) tablet 0 5 mg, 0 5 mg, Oral, Daily PRN, Dom Burton MD  •  melatonin tablet 3 mg, 3 mg, Oral, HS, Dom Burton MD, 3 mg at 12/14/22 2217  •  nicotine polacrilex (NICORETTE) gum 2 mg, 2 mg, Oral, Q2H PRN, Liddie Cutler, CRNP, 2 mg at 12/14/22 2001  •  [facility]ron [facility] injection 4 mg, 4 mg, Intravenous, Q6H PRN, Liddie Beny, CRNP, 4 mg at 12/15/22 5874  •  senna (SENOKOT) tablet 8 6 mg, 1 tablet, Oral, HS, Danish Omer MD  •  sodium chloride 0 9 % bolus 1,000 mL, 1,000 mL, Intravenous, Once PRN **AND** sodium chloride 0 9 % bolus 1,000 mL, 1,000 mL, Intravenous, Once PRN, Dom Burton MD    Blood Culture:   No results found for: BLOODCX    Wound Culture:   No results found for: WOUNDCULT    Ins and Outs:  I/O last 24 hours: In: 5037 9 [P O :1440; I V :3597 9]  Out: 5025 [Urine:5025]          Physical:  Vitals:    12/15/22 0252   BP: 121/62   Pulse: 74   Resp:    Temp: 98 6 °F (37 °C)   SpO2: 96%     Musculoskeletal: left Lower Extremity  · Skin intact around dressing   · Dressing clean dry and intact   · TTP about knee globally   · SILT s/s/sp/dp/t  +fhl/ehl, +ankle dorsi/plantar flexion  2+ DP pulse    Assessment:    62 y  o female POD 1 L TKA doing overall well with nausea and expected post op pain      Plan:  · WB AT LLE   · PT/OT  · Pain control  · DVT ppx  · Dispo: Ortho will follow     Dom Burton MD

## 2022-12-15 NOTE — PLAN OF CARE
Problem: PAIN - ADULT  Goal: Verbalizes/displays adequate comfort level or baseline comfort level  Description: Interventions:  - Encourage patient to monitor pain and request assistance  - Assess pain using appropriate pain scale  - Administer analgesics based on type and severity of pain and evaluate response  - Implement non-pharmacological measures as appropriate and evaluate response  - Consider cultural and social influences on pain and pain management  - Notify physician/advanced practitioner if interventions unsuccessful or patient reports new pain  Outcome: Progressing     Problem: INFECTION - ADULT  Goal: Absence or prevention of progression during hospitalization  Description: INTERVENTIONS:  - Assess and monitor for signs and symptoms of infection  - Monitor lab/diagnostic results  - Monitor all insertion sites, i e  indwelling lines, tubes, and drains  - Monitor endotracheal if appropriate and nasal secretions for changes in amount and color  - [place] appropriate cooling/warming therapies per order  - Administer medications as ordered  - Instruct and encourage patient and family to use good hand hygiene technique  - Identify and instruct in appropriate isolation precautions for identified infection/condition  Outcome: Progressing     Problem: SAFETY ADULT  Goal: Patient will remain free of falls  Description: INTERVENTIONS:  - Educate patient/family on patient safety including physical limitations  - Instruct patient to call for assistance with activity   - Consult OT/PT to assist with strengthening/mobility   - Keep Call bell within reach  - Keep bed low and locked with side rails adjusted as appropriate  - Keep care items and personal belongings within reach  - Initiate and maintain comfort rounds  - Make Fall Risk Sign visible to staff  - Offer Toileting every  Hours, in advance of need  - Initiate/Maintain alarm  - Obtain necessary fall risk management equipment:   - Apply yellow socks and bracelet for high fall risk patients  - Consider moving patient to room near nurses station  Outcome: Progressing     Problem: DISCHARGE PLANNING  Goal: Discharge to home or other facility with appropriate resources  Description: INTERVENTIONS:  - Identify barriers to discharge w/patient and caregiver  - Arrange for needed discharge resources and transportation as appropriate  - Identify discharge learning needs (meds, wound care, etc )  - Arrange for interpretive services to assist at discharge as needed  - Refer to Case Management Department for coordinating discharge planning if the patient needs post-hospital services based on physician/advanced practitioner order or complex needs related to functional status, cognitive ability, or social support system  Outcome: Progressing     Problem: Knowledge Deficit  Goal: Patient/family/caregiver demonstrates understanding of disease process, treatment plan, medications, and discharge instructions  Description: Complete learning assessment and assess knowledge base  Interventions:  - Provide teaching at level of understanding  - Provide teaching via preferred learning methods  Outcome: Progressing     Problem: Nutrition/Hydration-ADULT  Goal: Nutrient/Hydration intake appropriate for improving, restoring or maintaining nutritional needs  Description: Monitor and assess patient's nutrition/hydration status for malnutrition  Collaborate with interdisciplinary team and initiate plan and interventions as ordered  Monitor patient's weight and dietary intake as ordered or per policy  Utilize nutrition screening tool and intervene as necessary  Determine patient's food preferences and provide high-protein, high-caloric foods as appropriate       INTERVENTIONS:  - Monitor oral intake, urinary output, labs, and treatment plans  - Assess nutrition and hydration status and recommend course of action  - Evaluate amount of meals eaten  - Assist patient with eating if necessary   - Allow adequate time for meals  - Recommend/ encourage appropriate diets, oral nutritional supplements, and vitamin/mineral supplements  - Order, calculate, and assess calorie counts as needed  - Recommend, monitor, and adjust tube feedings and TPN/PPN based on assessed needs  - Assess need for intravenous fluids  - Provide specific nutrition/hydration education as appropriate  - Include patient/family/caregiver in decisions related to nutrition  Outcome: Progressing     Problem: RESPIRATORY - ADULT  Goal: Achieves optimal ventilation and oxygenation  Description: INTERVENTIONS:  - Assess for changes in respiratory status  - Assess for changes in mentation and behavior  - Position to facilitate oxygenation and minimize respiratory effort  - Oxygen administered by appropriate delivery if ordered  - Initiate smoking cessation education as indicated  - Encourage broncho-pulmonary hygiene including cough, deep breathe, Incentive Spirometry  - Assess the need for suctioning and aspirate as needed  - Assess and instruct to report SOB or any respiratory difficulty  - Respiratory Therapy support as indicated  Outcome: Progressing     Problem: MUSCULOSKELETAL - ADULT  Goal: Maintain or return mobility to safest level of function  Description: INTERVENTIONS:  - Assess patient's ability to carry out ADLs; assess patient's baseline for ADL function and identify physical deficits which impact ability to perform ADLs (bathing, care of mouth/teeth, toileting, grooming, dressing, etc )  - Assess/evaluate cause of self-care deficits   - Assess range of motion  - Assess patient's mobility  - Assess patient's need for assistive devices and provide as appropriate  - Encourage maximum independence but intervene and supervise when necessary  - Involve family in performance of ADLs  - Assess for home care needs following discharge   - Consider OT consult to assist with ADL evaluation and planning for discharge  - Provide patient education as appropriate  Outcome: Progressing  Goal: Maintain proper alignment of affected body part  Description: INTERVENTIONS:  - Support, maintain and protect limb and body alignment  - Provide patient/ family with appropriate education  Outcome: Progressing     Problem: MOBILITY - ADULT  Goal: Maintain or return to baseline ADL function  Description: INTERVENTIONS:  -  Assess patient's ability to carry out ADLs; assess patient's baseline for ADL function and identify physical deficits which impact ability to perform ADLs (bathing, care of mouth/teeth, toileting, grooming, dressing, etc )  - Assess/evaluate cause of self-care deficits   - Assess range of motion  - Assess patient's mobility; develop plan if impaired  - Assess patient's need for assistive devices and provide as appropriate  - Encourage maximum independence but intervene and supervise when necessary  - Involve family in performance of ADLs  - Assess for home care needs following discharge   - Consider OT consult to assist with ADL evaluation and planning for discharge  - Provide patient education as appropriate  Outcome: Progressing  Goal: Maintains/Returns to pre admission functional level  Description: INTERVENTIONS:  - Perform BMAT or MOVE assessment daily    - Set and communicate daily mobility goal to care team and patient/family/caregiver  - Collaborate with rehabilitation services on mobility goals if consulted  - Perform Range of Motion  times a day  - Reposition patient every  hours    - Dangle patient  times a day  - Stand patient  times a day  - Ambulate patient  times a day  - Out of bed to chair  times a day   - Out of bed for meals times a day  - Out of bed for toileting  - Record patient progress and toleration of activity level   Outcome: Progressing

## 2022-12-15 NOTE — PLAN OF CARE
Problem: PAIN - ADULT  Goal: Verbalizes/displays adequate comfort level or baseline comfort level  Description: Interventions:  - Encourage patient to monitor pain and request assistance  - Assess pain using appropriate pain scale  - Administer analgesics based on type and severity of pain and evaluate response  - Implement non-pharmacological measures as appropriate and evaluate response  - Consider cultural and social influences on pain and pain management  - Notify physician/advanced practitioner if interventions unsuccessful or patient reports new pain  Outcome: Adequate for Discharge     Problem: INFECTION - ADULT  Goal: Absence or prevention of progression during hospitalization  Description: INTERVENTIONS:  - Assess and monitor for signs and symptoms of infection  - Monitor lab/diagnostic results  - Monitor all insertion sites, i e  indwelling lines, tubes, and drains  - Monitor endotracheal if appropriate and nasal secretions for changes in amount and color  - [place] appropriate cooling/warming therapies per order  - Administer medications as ordered  - Instruct and encourage patient and family to use good hand hygiene technique  - Identify and instruct in appropriate isolation precautions for identified infection/condition  Outcome: Adequate for Discharge     Problem: SAFETY ADULT  Goal: Patient will remain free of falls  Description: INTERVENTIONS:  - Educate patient/family on patient safety including physical limitations  - Instruct patient to call for assistance with activity   - Consult OT/PT to assist with strengthening/mobility   - Keep Call bell within reach  - Keep bed low and locked with side rails adjusted as appropriate  - Keep care items and personal belongings within reach  - Initiate and maintain comfort rounds  - Make Fall Risk Sign visible to staff  - Offer Toileting every  Hours, in advance of need  - Initiate/Maintain alarm  - Obtain necessary fall risk management equipment:   - Apply yellow socks and bracelet for high fall risk patients  - Consider moving patient to room near nurses station  Outcome: Adequate for Discharge     Problem: DISCHARGE PLANNING  Goal: Discharge to home or other facility with appropriate resources  Description: INTERVENTIONS:  - Identify barriers to discharge w/patient and caregiver  - Arrange for needed discharge resources and transportation as appropriate  - Identify discharge learning needs (meds, wound care, etc )  - Arrange for interpretive services to assist at discharge as needed  - Refer to Case Management Department for coordinating discharge planning if the patient needs post-hospital services based on physician/advanced practitioner order or complex needs related to functional status, cognitive ability, or social support system  Outcome: Adequate for Discharge     Problem: Knowledge Deficit  Goal: Patient/family/caregiver demonstrates understanding of disease process, treatment plan, medications, and discharge instructions  Description: Complete learning assessment and assess knowledge base    Interventions:  - Provide teaching at level of understanding  - Provide teaching via preferred learning methods  Outcome: Adequate for Discharge     Problem: RESPIRATORY - ADULT  Goal: Achieves optimal ventilation and oxygenation  Description: INTERVENTIONS:  - Assess for changes in respiratory status  - Assess for changes in mentation and behavior  - Position to facilitate oxygenation and minimize respiratory effort  - Oxygen administered by appropriate delivery if ordered  - Initiate smoking cessation education as indicated  - Encourage broncho-pulmonary hygiene including cough, deep breathe, Incentive Spirometry  - Assess the need for suctioning and aspirate as needed  - Assess and instruct to report SOB or any respiratory difficulty  - Respiratory Therapy support as indicated  Outcome: Adequate for Discharge     Problem: MUSCULOSKELETAL - ADULT  Goal: Maintain or return mobility to safest level of function  Description: INTERVENTIONS:  - Assess patient's ability to carry out ADLs; assess patient's baseline for ADL function and identify physical deficits which impact ability to perform ADLs (bathing, care of mouth/teeth, toileting, grooming, dressing, etc )  - Assess/evaluate cause of self-care deficits   - Assess range of motion  - Assess patient's mobility  - Assess patient's need for assistive devices and provide as appropriate  - Encourage maximum independence but intervene and supervise when necessary  - Involve family in performance of ADLs  - Assess for home care needs following discharge   - Consider OT consult to assist with ADL evaluation and planning for discharge  - Provide patient education as appropriate  Outcome: Adequate for Discharge  Goal: Maintain proper alignment of affected body part  Description: INTERVENTIONS:  - Support, maintain and protect limb and body alignment  - Provide patient/ family with appropriate education  Outcome: Adequate for Discharge     Problem: MOBILITY - ADULT  Goal: Maintain or return to baseline ADL function  Description: INTERVENTIONS:  -  Assess patient's ability to carry out ADLs; assess patient's baseline for ADL function and identify physical deficits which impact ability to perform ADLs (bathing, care of mouth/teeth, toileting, grooming, dressing, etc )  - Assess/evaluate cause of self-care deficits   - Assess range of motion  - Assess patient's mobility; develop plan if impaired  - Assess patient's need for assistive devices and provide as appropriate  - Encourage maximum independence but intervene and supervise when necessary  - Involve family in performance of ADLs  - Assess for home care needs following discharge   - Consider OT consult to assist with ADL evaluation and planning for discharge  - Provide patient education as appropriate  Outcome: Adequate for Discharge  Goal: Maintains/Returns to pre admission functional level  Description: INTERVENTIONS:  - Perform BMAT or MOVE assessment daily    - Set and communicate daily mobility goal to care team and patient/family/caregiver  - Collaborate with rehabilitation services on mobility goals if consulted  - Perform Range of Motion  times a day  - Reposition patient ever hours    - Dangle patient  times a day  - Stand patient  times a day  - Ambulate patient  times a day  - Out of bed to chair  times a day   - Out of bed for meals  times a day  - Out of bed for toileting  - Record patient progress and toleration of activity level   Outcome: Adequate for Discharge

## 2022-12-15 NOTE — PLAN OF CARE
Problem: PHYSICAL THERAPY ADULT  Goal: Performs mobility at highest level of function for planned discharge setting  See evaluation for individualized goals  Description: Treatment/Interventions: Functional transfer training, LE strengthening/ROM, Elevations, Therapeutic exercise, Endurance training, Patient/family training, Equipment eval/education, Bed mobility, Gait training, Spoke to nursing, OT  Equipment Recommended: Dennis Mendez       See flowsheet documentation for full assessment, interventions and recommendations  Outcome: Progressing  Note: Prognosis: Good  Problem List: Decreased strength, Decreased range of motion, Decreased endurance, Impaired balance, Decreased mobility, Orthopedic restrictions, Pain  Assessment: Pt presents to PT with decreased standing and ambulatory balance, decreased LE strength, pain, decreased LLE mobility and overall impaired functional mobility compared to baseline  Pt was S for transfers, S for ambulation with RW, and min Ax1 for stair navigation  Pt required verbal cues for LE sequencing on stairs  Pt denied dizziness and lightheadedness throughout the session, and reported slight nausea that did not limit treatment  Overall, pt was limited by pain  Pt was left in bedside chair with call bell in reach  Pt would continue to benefit from skilled PT services during hospital stay to improve previously mentioned functional deficits  Recommend dc home with OP PT  PT will continue to follow  Barriers to Discharge: None     PT Discharge Recommendation: Home with outpatient rehabilitation    See flowsheet documentation for full assessment

## 2022-12-16 ENCOUNTER — TELEPHONE (OUTPATIENT)
Dept: OBGYN CLINIC | Facility: HOSPITAL | Age: 57
End: 2022-12-16

## 2022-12-16 DIAGNOSIS — M17.12 PRIMARY OSTEOARTHRITIS OF LEFT KNEE: ICD-10-CM

## 2022-12-16 RX ORDER — HYDROCODONE BITARTRATE AND ACETAMINOPHEN 5; 325 MG/1; MG/1
1 TABLET ORAL EVERY 6 HOURS PRN
Qty: 40 TABLET | Refills: 0 | Status: SHIPPED | OUTPATIENT
Start: 2022-12-16 | End: 2022-12-21 | Stop reason: SDUPTHER

## 2022-12-16 NOTE — TELEPHONE ENCOUNTER
Pt returned my call to complete a postoperative follow up call assessment  She reports she is "doing ok" and using the RW to ambulate around the home, sleeping "pretty good " She reports it is "swollen" but the ACE has fallen down, and they will re-wrap, and they continue to ICE  Her dressing is dry, denies any drainage  outpatient PT is Monday, 12/19  Pt "does not want the Oxycodone" and they want the [address] was originally prescribed, then switched and Oxycodone was sent  They took "72 hours worth of [place]" from the pharmacy  And are taking the Norco every 6 hours, and Robaxin 500mg 4x/daily  THey are also using  Tylenol 500mg (and aware of 3000mg/24hours)  She is also taking the lovenox daily (evening)

## 2022-12-19 ENCOUNTER — OFFICE VISIT (OUTPATIENT)
Dept: PHYSICAL THERAPY | Age: 57
End: 2022-12-19

## 2022-12-19 DIAGNOSIS — M17.12 PRIMARY OSTEOARTHRITIS OF LEFT KNEE: ICD-10-CM

## 2022-12-19 DIAGNOSIS — G89.29 CHRONIC PAIN OF LEFT KNEE: Primary | ICD-10-CM

## 2022-12-19 DIAGNOSIS — M25.562 CHRONIC PAIN OF LEFT KNEE: Primary | ICD-10-CM

## 2022-12-19 NOTE — PROGRESS NOTES
PT Re-Evaluation    Today's date: 2022  Patient name: Yaerli Booth  : 1965  MRN: [id number]  Referring provider: Ana Arzate  Dx:   Encounter Diagnosis     ICD-10-CM    1  Chronic pain of left knee  M25 562     G89 29       2  Primary osteoarthritis of left knee  M17 12           Start Time: 1105  Stop Time: 1203  Total time in clinic (min): 58 minutes    Assessment  Assessment details: Shanita Gonzalez is a 62year old Female presenting to [facility] following L TKA performed on 22 by Dr Jeff Palacios - had pre-operation physical therapy evaluation  Has follow up with Dr Jeff Palacios on 22 for routine one week follow  Due to change in status secondary to surgery, re-evaluation performed during today's session  Upon re-evaluation they show impairments in the following areas: decreased strength, active and passive ROM of L knee, moderate swelling on L knee compared to R, increased pain and irritability in L knee, using roller walker for mobility  Reported increased diffuse calf pain - no tenderness to palpation or localized areas within the calf, no warm or redness noted: patient educated on signs and symptoms of DVT and procedure, patient verbalized good understanding  These impairments limit their ability to perform daily activities as well as their previous level of function causing decreased quality of life  Patient requires continued skilled PT services in order to improve aforementioned impairments so that they are able to return to highest level of function possible  Without skilled PT services, patient will have functional decline with decreased motion throughout LLE  Impairments: abnormal gait, abnormal or restricted ROM, impaired balance, impaired physical strength, lacks appropriate home exercise program and pain with function    Goals  Impairment Goals to be met within 4 weeks    - Decrease pain to 5/10 post-op L knee  - Improve ROM to 90 degrees flexion, active  - Increase ROM to 0 Degrees extension, active  - Improve PROM by 5-10 degrees flex, ext   - Increase strength to 3+/5 throughout  - Reduce edema by 50% L LE     Functional Goals to be met within 4-6 weeks  - Return to Prior Level of Function  - Increase Functional Status Measure to: expected  - Patient will be independent with HEP  - Patient to be I with use of RW  - initiate gait training with [facility] when applicable  - Restore appropriate gait pattern with use of assisted device  Plan  Planned modality interventions: electrical stimulation/Russian stimulation, TENS, thermotherapy: hydrocollator packs and cryotherapy  Planned therapy interventions: joint mobilization, manual therapy, massage, neuromuscular re-education, patient education, postural training, self care, strengthening, stretching, therapeutic activities, therapeutic exercise, balance, balance/weight bearing training, abdominal trunk stabilization, dressing changes, flexibility, functional ROM exercises, gait training, graded exercise and home exercise program  Frequency: 2x week  Duration in visits: 16  Duration in weeks: 8  Plan of Care beginning date: 12/19/2022  Plan of Care expiration date: 2/19/2023  Treatment plan discussed with: family        Subjective Evaluation    History of Present Illness  Date of surgery: 12/14/2022  Mechanism of injury: Bob Briscoe is a 63 y/o female presenting to OPPT following L TKA performed on 12/14 by Dr Laquita Garcia  Pt had pre-operative physical therapy evaluation on 12/5/22  Has been 5 days since surgery  Has follow up with surgeon this Wednesday  "I was having a good day on Friday but I think I do a lot, too much, which I think put me in some pain for the future  I got myself washed, dressed, washed my hair  Then I went downhill from there"  Has been extremely painful recently since that day  Isn't able to take oxycodone because it makes her nauseas but has been on Norco since  Lives in a ranch style house   Few steps to enter from the front and the back porch  Has a difficult time with the step but "it is doable"  Sometimes "I have been screaming in pain"  Not a recurrent problem   Quality of life: good    Pain  Current pain ratin  At best pain ratin  At worst pain ratin  Quality: sharp    Social Support  Steps to enter house: yes  Stairs in house: no   Lives in: [place] house  Lives with: spouse    Employment status: not working    Diagnostic Tests  No diagnostic tests performed  Treatments  Previous treatment: medication  Patient Goals  Patient goals for therapy: decreased edema, increased strength, decreased pain, independence with ADLs/IADLs and return to sport/leisure activities  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 would like to get back to [place]        Objective     Skin Assessment  Incision looks good, no signs of infection noted  No discharge coming from wound  Good healing noted   Dressing changed during session    Active Range of Motion     Left Knee   Flexion: 71 degrees with pain  (preop 124 degrees with pain)  Extension: -11 degrees with pain (preop 0 degrees with pain)     Right Knee   Flexion: preop 135 degrees (no change)  Extension: preop 0 degrees (no change)     Passive Range of motion   R Knee: WNL for flexion and extension     L knee   Flexion: 79 degrees with OP / had increased pain    Extension: -8 degrees with OP / had increased pain     Mobility   Patellar Mobility: (No change)   Left Knee   Hypomobile: left medial, left lateral, left superior and left inferior     Right Knee   WFL: medial, lateral, superior and inferior     Strength/Myotome Testing      Left Hip   Planes of Motion   Flexion: 3+ throughout 4  Abduction: 3+ throughout   Adduction: 5     Right Hip  (no change)  Planes of Motion   Flexion: 4+  Abduction: 5  Adduction: 5     Left Knee   Flexion: 3- (3+ previously)  Extension: 3- (previously 4)      Right Knee   Flexion: 5  Extension: 5     Left Ankle/Foot   Dorsiflexion: 5  Plantar flexion: 5     Right Ankle/Foot   Dorsiflexion: 5  Plantar flexion: 5     Functional Assessment: SLS and TUG next session        Precautions: COPD, Anxiety, Depression, PTSD, FALL RISK        L TKA  Manuals  IE 12/6 12/19         Knee PROM flex/ext to tolerance   [address]          Patellar mobs   [address] + dressing change                                      Neuro Re-Ed                           SLS             sidestepping             Tandem walking                                                       Ther Ex                           QS    20x5"          Ankle pumps    30x         Heel slides    30x3"          Sup hip abd    NV         SAQ    NV         LAQ    NV                       SLR x 3, start with L              Ham curls, start with L             HR, B, standing              Supine Knee extension stretch     3' 2# lb weight         Nu step   NV                                                                                                                                                     Ther Activity                                         Gait Training             Gait training with RW                           Modalities             CP PRN                           Progress as able

## 2022-12-21 ENCOUNTER — OFFICE VISIT (OUTPATIENT)
Dept: OBGYN CLINIC | Facility: HOSPITAL | Age: 57
End: 2022-12-21

## 2022-12-21 ENCOUNTER — HOSPITAL ENCOUNTER (OUTPATIENT)
Dept: RADIOLOGY | Facility: HOSPITAL | Age: 57
Discharge: HOME | End: 2022-12-21
Attending: ORTHOPAEDIC SURGERY

## 2022-12-21 VITALS
HEART RATE: 69 BPM | SYSTOLIC BLOOD PRESSURE: 141 MMHG | HEIGHT: 63 IN | WEIGHT: 123 LBS | DIASTOLIC BLOOD PRESSURE: 82 MMHG | BODY MASS INDEX: 21.79 KG/M2

## 2022-12-21 DIAGNOSIS — R11.0 NAUSEA: Primary | ICD-10-CM

## 2022-12-21 DIAGNOSIS — Z47.1 AFTERCARE FOLLOWING LEFT KNEE JOINT REPLACEMENT SURGERY: ICD-10-CM

## 2022-12-21 DIAGNOSIS — Z96.659 STATUS POST TOTAL KNEE REPLACEMENT, UNSPECIFIED LATERALITY: ICD-10-CM

## 2022-12-21 DIAGNOSIS — Z96.652 AFTERCARE FOLLOWING LEFT KNEE JOINT REPLACEMENT SURGERY: ICD-10-CM

## 2022-12-21 DIAGNOSIS — M17.12 PRIMARY OSTEOARTHRITIS OF LEFT KNEE: ICD-10-CM

## 2022-12-21 RX ORDER — METHOCARBAMOL 500 MG/1
500 TABLET, FILM COATED ORAL 4 TIMES DAILY
Qty: 28 TABLET | Refills: 0 | Status: SHIPPED | OUTPATIENT
Start: 2022-12-21 | End: 2022-12-29

## 2022-12-21 RX ORDER — HYDROCODONE BITARTRATE AND ACETAMINOPHEN 5; 325 MG/1; MG/1
1 TABLET ORAL EVERY 6 HOURS PRN
Qty: 40 TABLET | Refills: 0 | Status: SHIPPED | OUTPATIENT
Start: 2022-12-21 | End: 2022-12-31

## 2022-12-21 RX ORDER — ONDANSETRON 4 MG/1
4 TABLET, FILM COATED ORAL EVERY 8 HOURS PRN
Qty: 21 TABLET | Refills: 2 | Status: SHIPPED | OUTPATIENT
Start: 2022-12-21

## 2022-12-21 NOTE — PROGRESS NOTES
Assessment:  1  Nausea        2  Primary osteoarthritis of left knee        3  Status post total knee replacement, unspecified laterality            Plan:  One week s/p left TKA with robot with tibia screw removal, 12/14/2022  The patient is doing well and should continue daily Lovenox, pain medications as needed and current physical therapy regimen  The patient can shower letting soapy water over incision, yet should not to submerge the incision, and then pat dry  Norco, methocarbamol and Zofran refilled  The patient should follow up in one week  To do next visit:  Return in about 1 week (around 12/28/2022)  The above stated was discussed in layman's terms and the patient expressed understanding  All questions were answered to the patient's satisfaction  Scribe Attestation    I,:  Naomie Sweet am acting as a scribe while in the presence of the attending physician :       I,:  Isidro Elizondo MD personally performed the services described in this documentation    as scribed in my presence :             Subjective:   Diallo Ware is a 62 y o  female who presents one week s/p left TKA with robot with tibia screw removal, 12/14/2022  The patient is doing well  Today she complains of generalized left knee pain  She does participate in physical therapy  She does use Lovenox daily  She does use Norco and Methocarbamol for pain control  She denies fever, chills or shortness of breath          Review of systems negative unless otherwise specified in HPI    Past Medical History:   Diagnosis Date   • [facility] 8-12-95/7-31-18    recovering alcoholic, drug addict/compulsive over-eater   • Anemia    • Anxiety    • Arthritis    • Callus    • Complete Tear of the Anterior cruciate ligament tear of the knee    • COPD (chronic obstructive pulmonary disease) (HCC)    • Depression    • Difficulty walking    • Exposure to hepatitis B     Resolved: 12/19/17   • History of cholesteatoma right ear   • History of COVID-19 2022    mild s/s   • History of prior pregnancies     A1   • Irritable bowel syndrome    • Lung disease    • Papanicolaou smear 2010    NEG   • Plantar fasciitis    • PONV (postoperative nausea and vomiting)    • Reactive airway disease     Last assessed: 16       Past Surgical History:   Procedure Laterality Date   • BREAST CYST ASPIRATION Right    • COLONOSCOPY     • CYSTECTOMY Left     benign cystectomy below breast   • DILATION AND CURETTAGE OF UTERUS     • FACIAL RECONSTRUCTION SURGERY     • FOOT SURGERY Right     endoscopic plantar fasciotomy   • GUM SURGERY  2019    with bone graft   • KNEE ARTHROSCOPY Left 1997    menisectomy   • KNEE ARTHROSCOPY Left 10/1997    ACL repair   • MAMMO (HISTORICAL) Bilateral 2010    benign   • MYRINGOTOMY W/ TUBES Right 2019    Procedure: MYRINGOTOMY W/ INSERTION VENTILATION TUBE EAR;  Surgeon: Shen Hough DO;  Location: [facility] MA[facility];  Service: ENT   • OR REPAIR OF [place] Right 2022    Procedure: REPAIR HAMMERTOE 2nd;  Surgeon: Marcelo Gardner DPM;  Location: [address] [place] [address] MAIN OR;  Service: Podiatry   • OR SHLDR ARTHROSCOP,SURG,W/ROTAT CUFF REPR Left 2018    Procedure: SHOULDER ARTHROSCOPY ROTATOR CUFF REPAIR; SUBACROMIAL DECOMPRESSION;  Surgeon: Josette Feldman MD;  Location: [facility] MAIN OR;  Service: Orthopedics   • OR TOTAL KNEE ARTHROPLASTY Left 2022    Procedure: ARTHROPLASTY KNEE TOTAL W ROBOT with screw removal left proximal tibia;   Surgeon: Junella Klinefelter, MD;  Location: [facility] MA[facility];  Service: Orthopedics   • OR TYMPANOPLAS/MASTOIDEC,INTACT WALL Right 2019    Procedure: TYMPANOPLASTY;  Surgeon: Shen Hough DO;  Location: [facility] MA[facility];  Service: ENT   • SHOULDER SURGERY     • TONSILLECTOMY AND ADENOIDECTOMY     • TUBAL LIGATION     • VAGINAL DELIVERY     • WISDOM TOOTH EXTRACTION         Family History   Problem Relation Age of Onset   • Mental illness Mother    • Substance Abuse Mother    • Lung cancer Mother    • Cancer Mother            • Hypertension Father         Benign Essential   • Cataracts Father    • Diabetes Father            • Mental illness Father    • Hyperlipidemia Father    • Depression Father    • Cancer Father         GIST-    • Schizophrenia Sister    • Squamous cell carcinoma Sister    • Diabetes Sister    • Hypertension Sister    • Mental illness Sister    • Cancer Sister    • Cancer Maternal Grandmother            • Diabetes Maternal Grandmother    • Heart failure Maternal Grandfather    • Lung cancer Paternal Grandmother    • Hyperlipidemia Paternal Grandmother    • Cancer Paternal Grandmother            • Hyperlipidemia Paternal Grandfather    • No Known Problems Son    • Pulmonary fibrosis Paternal Aunt    • Schizophrenia Paternal Aunt    • Diabetes Paternal Aunt    • Diabetes Brother    • Hypertension Brother    • Hyperlipidemia Brother    • Diabetes Brother    • Hypertension Brother    • Diabetes Brother         possibly one or both   • Mental illness Paternal Aunt        Social History     Occupational History   • Not on file   Tobacco Use   • Smoking status: Every Day     Packs/day: 0 50     Years: 40 00     Pack years: 20 00     Types: Cigarettes     Start date:    • Smokeless tobacco: Never   Vaping Use   • Vaping Use: Never used   Substance and Sexual Activity   • Alcohol use: Not Currently     Comment: Quit 23 years ago   Recovering alcoholic   • Drug use: Not Currently     Types: Cocaine, "Crack" cocaine     Comment: clean since    • Sexual activity: Yes     Partners: Male     Birth control/protection: None         Current Outpatient Medications:   •  albuterol (PROVENTIL HFA,VENTOLIN HFA) 90 mcg/act inhaler, Inhale 2 puffs every 4 (four) hours as needed, Disp: , Rfl:   •  dextromethorphan-guaifenesin (MUCINEX DM)  MG per 12 hr tablet, in the morning, Disp: , Rfl:   •  ferrous sulfate 324 (65 Fe) mg, Take one tablet daily  , Disp: 30 tablet, Rfl: 0  •  HYDROcodone-acetaminophen (Norco) 5-325 mg per tablet, Take 1 tablet by mouth every 6 (six) hours as needed for pain for up to 10 days Max Daily Amount: 4 tablets, Disp: 40 tablet, Rfl: 0  •  ipratropium (ATROVENT) 0 02 % nebulizer solution, Take 0 5 mg by nebulization 2 (two) times a day, Disp: , Rfl:   •  Loperamide HCl (IMODIUM PO), Take 2 mg by mouth in the morning  , Disp: , Rfl:   •  LORazepam (ATIVAN) 0 5 mg tablet, Take 1 tablet (0 5 mg total) by mouth daily as needed for anxiety, Disp: 30 tablet, Rfl: 0  •  MELATONIN PO, Take 10 mg by mouth daily at bedtime, Disp: , Rfl:   •  methocarbamol (ROBAXIN) 500 mg tablet, Take 1 tablet (500 mg total) by mouth 4 (four) times a day for 7 days, Disp: 28 tablet, Rfl: 0  •  Multiple Minerals-Vitamins (ROSLYN-MAG-ZINC-D PO), Take 1 tablet by mouth daily, Disp: , Rfl:   •  Multiple Vitamins-Minerals (multivitamin with minerals) tablet, Take 1 tablet by mouth daily, Disp: , Rfl:   •  ondansetron (ZOFRAN) 4 mg tablet, Take 1 tablet (4 mg total) by mouth every 8 (eight) hours as needed for nausea or vomiting, Disp: 20 tablet, Rfl: 0  •  oxyCODONE (Roxicodone) 5 immediate release tablet, Take 1 tablet (5 mg total) by mouth every 4 (four) hours as needed for moderate pain Max Daily Amount: 30 mg, Disp: 28 tablet, Rfl: 0  •  zolpidem (AMBIEN) 10 mg tablet, TAKE ONE TABLET BY MOUTH AT BEDTIME IF NEEDED FOR SLEEP, Disp: 30 tablet, Rfl: 5  •  ascorbic acid (VITAMIN C) 500 MG tablet, Take 1 tablet (500 mg total) by mouth 2 (two) times a day, Disp: 60 tablet, Rfl: 0  •  enoxaparin (LOVENOX) 40 mg/0 4 mL, Inject 0 4 mL (40 mg total) under the skin daily for 28 days To start postoperatively (Patient not taking: Reported on 10/4/2022), Disp: 11 2 mL, Rfl: 0  •  folic acid (FOLVITE) 1 mg tablet, Take 1 tablet (1 mg total) by mouth daily, Disp: 30 tablet, Rfl: 0  •  naloxone (NARCAN) 4 mg/0 1 mL nasal spray, Administer 1 spray into a nostril  If no response after 2-3 minutes, give another dose in the other nostril using a new spray , Disp: 1 each, Rfl: 0    Allergies   Allergen Reactions   • Percocet [Oxycodone-Acetaminophen] GI Intolerance            Vitals:    12/21/22 1400   BP: 141/82   Pulse: 69       Objective:  Physical exam  · General: Awake, Alert, Oriented  · Eyes: Pupils equal, round and reactive to light  · Heart: regular rate and rhythm  · Lungs: No audible wheezing  · Abdomen: soft                    Ortho Exam  Left knee:  Incision clean dry and intact  Staples well approximated   No erythema and no ecchymosis  Appropriate swelling of lower limb  Appropriate warmth of knee  Extensor mechanism intact  Extension limited  Flexion limited  Calf compartments soft and supple  Sensation intact  Toes are warm sensate and mobile      Diagnostics, reviewed and taken today if performed as documented: The attending physician has personally reviewed the pertinent films in PACS and interpretation is as follows:  Left knee x-ray:  Well aligned prosthesis with no acute changes  Procedures, if performed today:    Procedures    None performed      Portions of the record may have been created with voice recognition software  Occasional wrong word or "sound a like" substitutions may have occurred due to the inherent limitations of voice recognition software  Read the chart carefully and recognize, using context, where substitutions have occurred

## 2022-12-22 ENCOUNTER — OFFICE VISIT (OUTPATIENT)
Dept: PHYSICAL THERAPY | Age: 57
End: 2022-12-22

## 2022-12-22 DIAGNOSIS — M25.562 CHRONIC PAIN OF LEFT KNEE: Primary | ICD-10-CM

## 2022-12-22 DIAGNOSIS — M17.12 PRIMARY OSTEOARTHRITIS OF LEFT KNEE: ICD-10-CM

## 2022-12-22 DIAGNOSIS — G89.29 CHRONIC PAIN OF LEFT KNEE: Primary | ICD-10-CM

## 2022-12-22 NOTE — PROGRESS NOTES
Daily Note     Today's date: 2022  Patient name: Mackenzie Ortiz  : 1965  MRN: [id number]  Referring provider: Ana Madrigal  Dx:   Encounter Diagnosis     ICD-10-CM    1  Chronic pain of left knee  M25 562     G89 29       2  Primary osteoarthritis of left knee  M17 12                      Subjective: Patient had recheck appointment with Dr Latoya Laguerre yesterday  No new complaints      Objective: See treatment diary below      Assessment: Tolerated treatment fair  Patient demonstrated fatigue post treatment, exhibited good technique with therapeutic exercises and would benefit from continued PT  Patient has limited knee extension with poor tolerance to stretching activities at this time  Knee flexion improved to 81 degrees actively and 86 degrees passively in sitting  Plan: Continue per plan of care        Precautions: COPD, Anxiety, Depression, PTSD, FALL RISK    L TKA  Manuals  IE        Knee PROM flex/ext to tolerance   [address]   ksg       Patellar mobs   [address] + dressing change   ksg                                   Neuro Re-Ed                           SLS             sidestepping             Tandem walking                                                       Ther Ex                           QS    20x5"   20 x5"       Ankle pumps    30x  30 x       Heel slides    30x3"   30 x3"       Sup hip abd    NV  2x10       SAQ    NV  2x10       LAQ    NV  2x10                     SLR x 3, start with L              Ham curls, start with L             HR, B, standing              Supine Knee extension stretch     3' 2# lb weight  3' 2# weight       Nu step   NV lvl 1 8'        supine ham stretch     30" x3                                                                                                                                     Ther Activity                                         Gait Training             Gait training with RW                           Modalities             CP PRN                           Progress as able

## 2022-12-27 ENCOUNTER — OFFICE VISIT (OUTPATIENT)
Dept: PHYSICAL THERAPY | Age: 57
End: 2022-12-27

## 2022-12-27 ENCOUNTER — TELEPHONE (OUTPATIENT)
Dept: OBGYN CLINIC | Facility: HOSPITAL | Age: 57
End: 2022-12-27

## 2022-12-27 DIAGNOSIS — M25.562 CHRONIC PAIN OF LEFT KNEE: Primary | ICD-10-CM

## 2022-12-27 DIAGNOSIS — M17.12 PRIMARY OSTEOARTHRITIS OF LEFT KNEE: ICD-10-CM

## 2022-12-27 DIAGNOSIS — G89.29 CHRONIC PAIN OF LEFT KNEE: Primary | ICD-10-CM

## 2022-12-27 NOTE — PROGRESS NOTES
Daily Note     Today's date: 2022  Patient name: Jahaira Betancur  : 1965  MRN: [id number]  Referring provider: Dotty Infante  Dx:   Encounter Diagnosis     ICD-10-CM    1  Chronic pain of left knee  M25 562     G89 29       2  Primary osteoarthritis of left knee  M17 12                      Subjective: Patient has been having increased pain today, has been busy over the holiday weekend  Patient adjusted her walker shorter to fit into her bathroom, the shorter height may be contributing to her scapular pain today as well  Patient can adjust the height of the walker, but can't adjust it each time she's in/out of the bathroom  Objective: See treatment diary below    -15 P extension  80 P flexion       Assessment: Tolerated treatment well  Patient demonstrated fatigue post treatment, exhibited good technique with therapeutic exercises and would benefit from continued PT Spent more time stretching, education regarding elevating program and icing for home to alleviate the lower leg edema; swollen at the medial knee, medial and lateral lower leg  Taught patient additional ways to stretch extension and flexion  Recommended patient to focus on extension stretching  Quads moderately weak, possible muscle guarding in addition to pain causing her ROM limitations  PT talked with spouse about range, patient will continue to stretch, perform retrograde massage on the knee and will continue her exercises  Plan: Continue per plan of care        Precautions: COPD, Anxiety, Depression, PTSD, FALL RISK    L TKA  Manuals  IE      Knee PROM flex/ext to tolerance   [address]   ksg  x30' with manual work to knee  Extension stretching over bolster     Patellar mobs   [address] + dressing change   ksg  10x ea direction                                 Neuro Re-Ed                           SLS             sidestepping             Tandem walking                                                       Ther Ex                          QS    20x5"   20 x5"  10x:05     Ankle pumps    30x  30 x       Heel slides    30x3"   30 x3"       Sup hip abd    NV  2x10       SAQ    NV  2x10       LAQ    NV  2x10                     SLR x 3, start with L              Ham curls, start with L             HR, B, standing              Supine Knee extension stretch     3' 2# lb weight  3' 2# weight       Nu step   NV lvl 1 8'  x5- seat 8, no arms initially; needed at ~ 3 5 min      supine ham stretch     30" x3                                                                                                                                     Ther Activity                                         Gait Training             Gait training with RW                           Modalities             CP PRN                           Progress as able

## 2022-12-27 NOTE — TELEPHONE ENCOUNTER
Caller: Patient    Doctor:Tavares    Reason for call: patient states hydrocodone needs a prior auth    Call back#: [phone]

## 2022-12-28 ENCOUNTER — TELEPHONE (OUTPATIENT)
Dept: OBGYN CLINIC | Facility: HOSPITAL | Age: 57
End: 2022-12-28

## 2022-12-28 DIAGNOSIS — M17.12 PRIMARY OSTEOARTHRITIS OF LEFT KNEE: ICD-10-CM

## 2022-12-28 NOTE — TELEPHONE ENCOUNTER
Caller: La Damon     Doctor: Whit De La Paz    Reason for call: Pt checking on Prior Auth of medication     Medication left - 0  Medication - HYDROcodone-acetaminophen (Norco) 5-325 mg per tablet [[id number]]         Call back#: [phone]

## 2022-12-28 NOTE — TELEPHONE ENCOUNTER
Caller: Patient    Doctor: Mark Cao    Reason for call: Patient calling to check on the status of the prior auth for hydrocodone       Call back#: [phone]

## 2022-12-28 NOTE — TELEPHONE ENCOUNTER
Left msg that this was initiated and that insurance company called for codes this morning so they are working on this  Advised patient that she could pay out of pocket for this until [facility] is obtained

## 2022-12-28 NOTE — TELEPHONE ENCOUNTER
Caller: Senait Thompson    Doctor: Joana Andujar    Reason for call: Calling for diagnosis code for Hydrocodone  Provided diagnosis code  Understanding verbalized       Call back#: N/A

## 2022-12-28 NOTE — TELEPHONE ENCOUNTER
Patient informed insurance contacted the office via fax with the approval of the prior-authorization on the medication Hydrocodone-Acetaminophen

## 2022-12-29 ENCOUNTER — OFFICE VISIT (OUTPATIENT)
Dept: PHYSICAL THERAPY | Age: 57
End: 2022-12-29

## 2022-12-29 ENCOUNTER — OFFICE VISIT (OUTPATIENT)
Dept: OBGYN CLINIC | Facility: HOSPITAL | Age: 57
End: 2022-12-29

## 2022-12-29 VITALS
DIASTOLIC BLOOD PRESSURE: 76 MMHG | BODY MASS INDEX: 21.79 KG/M2 | HEART RATE: 77 BPM | HEIGHT: 63 IN | SYSTOLIC BLOOD PRESSURE: 134 MMHG

## 2022-12-29 DIAGNOSIS — M25.562 CHRONIC PAIN OF LEFT KNEE: Primary | ICD-10-CM

## 2022-12-29 DIAGNOSIS — G89.29 CHRONIC PAIN OF LEFT KNEE: Primary | ICD-10-CM

## 2022-12-29 DIAGNOSIS — Z96.659 STATUS POST TOTAL KNEE REPLACEMENT, UNSPECIFIED LATERALITY: Primary | ICD-10-CM

## 2022-12-29 DIAGNOSIS — M17.12 PRIMARY OSTEOARTHRITIS OF LEFT KNEE: ICD-10-CM

## 2022-12-29 RX ORDER — METHOCARBAMOL 500 MG/1
TABLET, FILM COATED ORAL
Qty: 28 TABLET | Refills: 0 | Status: SHIPPED | OUTPATIENT
Start: 2022-12-29

## 2022-12-29 NOTE — PROGRESS NOTES
Daily Note     Today's date: 2022  Patient name: Althea Amin  : 1965  MRN: [id number]  Referring provider: Elijah Hennessy  Dx:   Encounter Diagnosis     ICD-10-CM    1  Chronic pain of left knee  M25 562     G89 29       2  Primary osteoarthritis of left knee  M17 12                      Subjective: Patient reports pain continues in L knee derek at medial aspect  Pain at 6 after ex and 4/10 at rest Pt struggles the most with knee extension as she feels patella "locks up"  Pt has appt tomorrow with MD  Pt still uses walker as it is too painful to try cane with L knee  Pt does want to drive as soon as she can      Objective: See treatment diary below    -12 P extension  90 P flexion       Assessment: Tolerated treatment well  Patient demonstrated fatigue post treatment, exhibited good technique with therapeutic exercises and would benefit from continued PT Most emphasis on ROM and stretches   Pt has low pain threshold and it is difficult to be aggressive with ROM  Moved toward large amount of active ex to encourage movement and limit fear and to also give pt more control to limit anxiety  Now able to get 90 degrees but struggles to get less than 10 degrees extension  Responded better to hamstring stretches vs nay over pressure into extension  Will continue to encourage more active movement and ROM with sessions  Will await results of MD follow up      Plan: Continue per plan of care        Precautions: COPD, Anxiety, Depression, PTSD, FALL RISK    L TKA  Manuals  IE    Knee PROM flex/ext to tolerance   [address]   ksg  x30' with manual work to knee  Extension stretching over bolster  x10 min knee flexion in supine and supine ext/hamstretch   Patellar mobs   [address] + dressing change   ksg  10x ea direction  5 min                               Neuro Re-Ed                           SLS             sidestepping             Tandem walking                                                       Ther Ex              Nu step         10min   QS    20x5"   20 x5"  10x:05  [id number]   Ankle pumps    30x  30 x       Heel slides    30x3"   30 x3"    seated & supine x20 w/pillow case   Sup hip abd    NV  2x10    2x10   SAQ    NV  2x10    AAx10 +pain   LAQ    NV  2x10    2x10    Step onto step for ROM          4in x10, 6in x10   Leg press     B 2x10, L 1x10 w/#10   SLR x 3, start with L              Ham curls, start with L             HR, B, standing              Supine Knee extension stretch     3' 2# lb weight  3' 2# weight          NV lvl 1 8'  x5- seat 8, no arms initially; needed at ~ 3 5 min      supine ham stretch     30" x3    [id number]                                                                                                                                 Ther Activity                                         Gait Training             Gait training with RW                           Modalities             CP PRN                           Progress as able

## 2022-12-29 NOTE — PROGRESS NOTES
Assessment:  1  Status post total knee replacement, unspecified laterality            Plan:  2 weeks s/p left TKA with robot, 2022  The patient is doing well and should continue daily Lovenox, pain medications as needed and current physical therapy regimen  The patient can shower letting soapy water over incision, yet should not to submerge the incision, and then pat dry  The patient should follow up in 4 weeks  To do next visit:  Return in about 4 weeks (around 2023)  The above stated was discussed in layman's terms and the patient expressed understanding  All questions were answered to the patient's satisfaction  Scribe Attestation    I,:  Miguel Angel Mauricio am acting as a scribe while in the presence of the attending physician :       I,:  Praveen Palumbo MD personally performed the services described in this documentation    as scribed in my presence :             Subjective:   Yareli Booth is a 62 y o  female who presents 2 weeks s/p left TKA with robot, 2022  The patient is doing well  Today she complains of generalized left knee pain  She does participate in physical therapy  She does use Lovenox daily  She does use hydrocodone and methocarbamol for pain control  She denies fever, chills or shortness of breath          Review of systems negative unless otherwise specified in HPI    Past Medical History:   Diagnosis Date   • Alcoholism ([facility] 75 ) 95/18    recovering alcoholic, drug addict/compulsive over-eater   • Anemia    • Anxiety    • Arthritis    • Callus    • Complete Tear of the Anterior cruciate ligament tear of the knee    • COPD (chronic obstructive pulmonary disease) (HCC)    • Depression    • Difficulty walking    • Exposure to hepatitis B     Resolved: 17   • History of cholesteatoma     right ear   • History of COVID-19 2022    mild s/s   • History of prior pregnancies     A1   • Irritable bowel syndrome    • Lung disease    • Papanicolaou smear 2010    NEG   • Plantar fasciitis    • PONV (postoperative nausea and vomiting)    • Reactive airway disease     Last assessed: 16       Past Surgical History:   Procedure Laterality Date   • BREAST CYST ASPIRATION Right    • COLONOSCOPY     • CYSTECTOMY Left     benign cystectomy below breast   • DILATION AND CURETTAGE OF UTERUS     • FACIAL RECONSTRUCTION SURGERY     • FOOT SURGERY Right     endoscopic plantar fasciotomy   • GUM SURGERY  2019    with bone graft   • KNEE ARTHROSCOPY Left 1997    menisectomy   • KNEE ARTHROSCOPY Left 10/1997    ACL repair   • MAMMO (HISTORICAL) Bilateral 2010    benign   • MYRINGOTOMY W/ TUBES Right 2019    Procedure: MYRINGOTOMY W/ INSERTION VENTILATION TUBE EAR;  Surgeon: Archie Lovelace DO;  Location: [facility];  Service: ENT   • [place] ARTHRP KNE CONDYLE&PLATU MEDIAL&LAT COMPARTMENTS Left 2022    Procedure: ARTHROPLASTY KNEE TOTAL W ROBOT with screw removal left proximal tibia;   Surgeon: Nichole Vega MD;  Location: [facility];  Service: Orthopedics   • [place] CORRECTION HAMMERTOE Right 2022    Procedure: REPAIR HAMMERTOE 2nd;  Surgeon: Nayeli Camp DPM;  Location: [facility] MAIN OR;  Service: Podiatry   • [place] SURGICAL ARTHROSCOPY SHOULDER W/ROTATOR CUFF RPR Left 2018    Procedure: SHOULDER ARTHROSCOPY ROTATOR CUFF REPAIR; SUBACROMIAL DECOMPRESSION;  Surgeon: Frandy Byers MD;  Location: [facility] MAIN OR;  Service: Orthopedics   • [place] TMPP MASTOIDECT NTC/RCNSTED WALL W/O OCR Right 2019    Procedure: TYMPANOPLASTY;  Surgeon: Archie Lovelace DO;  Location: [facility];  Service: ENT   • SHOULDER SURGERY     • TONSILLECTOMY AND ADENOIDECTOMY     • TUBAL LIGATION     • VAGINAL DELIVERY     • WISDOM TOOTH EXTRACTION         Family History   Problem Relation Age of Onset   • Mental illness Mother    • Substance Abuse Mother    • Lung cancer Mother    • Cancer Mother            • Hypertension Father Benign Essential   • Cataracts Father    • Diabetes Father            • Mental illness Father    • Hyperlipidemia Father    • Depression Father    • Cancer Father         GIST-    • Schizophrenia Sister    • Squamous cell carcinoma Sister    • Diabetes Sister    • Hypertension Sister    • Mental illness Sister    • Cancer Sister    • Cancer Maternal Grandmother            • Diabetes Maternal Grandmother    • Heart failure Maternal Grandfather    • Lung cancer Paternal Grandmother    • Hyperlipidemia Paternal Grandmother    • Cancer Paternal Grandmother            • Hyperlipidemia Paternal Grandfather    • No Known Problems Son    • Pulmonary fibrosis Paternal [de-identified]    • Schizophrenia Paternal Aunt    • Diabetes Paternal Aunt    • Diabetes Brother    • Hypertension Brother    • Hyperlipidemia Brother    • Diabetes Brother    • Hypertension Brother    • Diabetes Brother         possibly one or both   • Mental illness Paternal Aunt        Social History     Occupational History   • Not on file   Tobacco Use   • Smoking status: Every Day     Packs/day: 0 50     Years: 40 00     Pack years: 20 00     Types: Cigarettes     Start date:    • Smokeless tobacco: Never   Vaping Use   • Vaping Use: Never used   Substance and Sexual Activity   • Alcohol use: Not Currently     Comment: Quit 23 years ago   Recovering alcoholic   • Drug use: Not Currently     Types: Cocaine, "Crack" cocaine     Comment: clean since    • Sexual activity: Yes     Partners: Male     Birth control/protection: None         Current Outpatient Medications:   •  albuterol (PROVENTIL HFA,VENTOLIN HFA) 90 mcg/act inhaler, Inhale 2 puffs every 4 (four) hours as needed, Disp: , Rfl:   •  dextromethorphan-guaifenesin (MUCINEX DM)  MG per 12 hr tablet, in the morning, Disp: , Rfl:   •  enoxaparin (LOVENOX) 40 mg/0 4 mL, Inject 0 4 mL (40 mg total) under the skin daily for 28 days To start postoperatively (Patient not taking: Reported on 10/4/2022), Disp: 11 2 mL, Rfl: 0  •  ferrous sulfate 324 (65 Fe) mg, Take one tablet daily  , Disp: 30 tablet, Rfl: 0  •  HYDROcodone-acetaminophen (Norco) 5-325 mg per tablet, Take 1 tablet by mouth every 6 (six) hours as needed for pain for up to 10 days Max Daily Amount: 4 tablets, Disp: 40 tablet, Rfl: 0  •  ipratropium (ATROVENT) 0 02 % nebulizer solution, Take 0 5 mg by nebulization 2 (two) times a day, Disp: , Rfl:   •  Loperamide HCl (IMODIUM PO), Take 2 mg by mouth in the morning  , Disp: , Rfl:   •  LORazepam (ATIVAN) 0 5 mg tablet, Take 1 tablet (0 5 mg total) by mouth daily as needed for anxiety, Disp: 30 tablet, Rfl: 0  •  MELATONIN PO, Take 10 mg by mouth daily at bedtime, Disp: , Rfl:   •  methocarbamol (ROBAXIN) 500 mg tablet, TAKE 1 TABLET BY MOUTH FOUR TIMES A DAY FOR 7 DAYS, Disp: 28 tablet, Rfl: 0  •  Multiple Minerals-Vitamins (ROSLYN-MAG-ZINC-D PO), Take 1 tablet by mouth daily, Disp: , Rfl:   •  Multiple Vitamins-Minerals (multivitamin with minerals) tablet, Take 1 tablet by mouth daily, Disp: , Rfl:   •  ondansetron (ZOFRAN) 4 mg tablet, Take 1 tablet (4 mg total) by mouth every 8 (eight) hours as needed for nausea or vomiting, Disp: 21 tablet, Rfl: 2  •  zolpidem (AMBIEN) 10 mg tablet, TAKE ONE TABLET BY MOUTH AT BEDTIME IF NEEDED FOR SLEEP, Disp: 30 tablet, Rfl: 5    Allergies   Allergen Reactions   • Percocet [Oxycodone-Acetaminophen] GI Intolerance            Vitals:    12/29/22 1359   BP: 134/76   Pulse: 77       Objective:  Physical exam  · General: Awake, Alert, Oriented  · Eyes: Pupils equal, round and reactive to light  · Heart: regular rate and rhythm  · Lungs: No audible wheezing  · Abdomen: soft                    Ortho Exam  Left knee:  Incision clean dry and intact  Staples well approximated and removed today   No erythema and no ecchymosis  Appropriate swelling of lower limb  Appropriate warmth of knee  Extensor mechanism intact  Extension near full  Flexion 90  Calf compartments soft and supple  Sensation intact  Toes are warm sensate and mobile      Diagnostics, reviewed and taken today if performed as documented:    None performed     Procedures, if performed today:    Procedures    None performed      Portions of the record may have been created with voice recognition software  Occasional wrong word or "sound a like" substitutions may have occurred due to the inherent limitations of voice recognition software  Read the chart carefully and recognize, using context, where substitutions have occurred

## 2023-01-03 ENCOUNTER — TELEPHONE (OUTPATIENT)
Dept: OBGYN CLINIC | Facility: HOSPITAL | Age: 58
End: 2023-01-03

## 2023-01-03 DIAGNOSIS — M25.50 ARTHRALGIA, UNSPECIFIED JOINT: Primary | ICD-10-CM

## 2023-01-03 DIAGNOSIS — F41.9 ANXIETY: ICD-10-CM

## 2023-01-03 RX ORDER — LORAZEPAM 0.5 MG/1
0.5 TABLET ORAL
Qty: 30 TABLET | Refills: 2 | Status: SHIPPED | OUTPATIENT
Start: 2023-01-03

## 2023-01-03 RX ORDER — CELECOXIB 100 MG/1
100 CAPSULE ORAL 2 TIMES DAILY
Qty: 60 CAPSULE | Refills: 2 | Status: SHIPPED | OUTPATIENT
Start: 2023-01-03 | End: 2023-01-10

## 2023-01-03 NOTE — TELEPHONE ENCOUNTER
Caller: Jamie Swann    Doctor: Lina Muñoz    Reason for call: Patient reports she has a few doses of her Hydrocodone left and is requesting to have something different, possibly something like "Celebrex" prescribed once she is done with the Hydrocodone    Please advise    Call back#: [phone]

## 2023-01-03 NOTE — TELEPHONE ENCOUNTER
Called pt to notify her that an prescription for celebrex was sent to her pharmacy  She stated that she use to be on this at 200mg but that she would try this lower dose

## 2023-01-04 ENCOUNTER — OFFICE VISIT (OUTPATIENT)
Dept: PHYSICAL THERAPY | Age: 58
End: 2023-01-04

## 2023-01-04 DIAGNOSIS — M17.12 PRIMARY OSTEOARTHRITIS OF LEFT KNEE: ICD-10-CM

## 2023-01-04 DIAGNOSIS — G89.29 CHRONIC PAIN OF LEFT KNEE: Primary | ICD-10-CM

## 2023-01-04 DIAGNOSIS — M25.562 CHRONIC PAIN OF LEFT KNEE: Primary | ICD-10-CM

## 2023-01-04 NOTE — PROGRESS NOTES
Daily Note     Today's date: 2023  Patient name: Shelia Levine  : 1965  MRN: [id number]  Referring provider: Bryson Macias  Dx:   Encounter Diagnosis     ICD-10-CM    1  Chronic pain of left knee  M25 562     G89 29       2  Primary osteoarthritis of left knee  M17 12                      Subjective: Patient reports that she has been busy over the holidays, felt ready to start using the cane at home  Patient also has been working on her extension ROM, laying prone on the bed  Still finding that she needs to massage her knee 2* muscular pain and fatigue  Patient notes IT band tightness, hamstring tightness 2* surgery and swelling  Objective: See treatment diary below    -5 to 97 L knee      Assessment: Tolerated treatment well  Patient demonstrated fatigue post treatment, exhibited good technique with therapeutic exercises and would benefit from continued PT Progressing well with new program, focusing on end range extension and progressive flexion  Patient tolerating progressions well today, motivated to progress with PT and progress her ROM  Patient would like to start aqua therapy when able, not ready yet until incision fully healed; and patient will need to discuss with surgeon prior to starting aqua therapy  PT recommending patient to continue with land based therapy focusing on ROM, strength and will progress to pool if/when patient has full ROM and atleast 4/5 strength in the knee  Patient moderately fatigued post treatment today, will ice at home  Incision healing well, no signs of infection  Patient does c/o clicking in the patella which could be related to quad/VMO weakness; likely patellofemoral        Plan: Continue per plan of care        Precautions: COPD, Anxiety, Depression, PTSD, FALL RISK    L TKA  Manuals    Knee PROM flex/ext to tolerance X 10'   Ext over [facility]   ksg  x30' with manual work to knee  Extension stretching over bolster  x10 min knee flexion in supine and supine ext/hamstretch   Patellar mobs  5' [facility] + dressing change   ksg  10x ea direction  5 min                               Neuro Re-Ed                           SLS             sidestepping             Tandem walking                                                       Ther Ex              Nu step  x10' L1       10min   QS long sitting  10x:05  20x5"   20 x5"  10x:05  [id number]   Ankle pumps  d/c  30x  30 x       Heel slides  seated 2x10  30x3"   30 x3"    seated & supine x20 w/pillow case   Sup hip abd  NT  NV  2x10    2x10   SAQ  2x10  NV  2x10    AAx10 +pain   LAQ  2x10  NV  2x10    2x10    Step onto step for ROM  step touch 4+6" 2x10        4in x10, 6in x10   Leg press B 2x10 10#  L 15x 10#      B 2x10, L 1x10 w/#10   SLR x 3, start with L              Ham curls, start with L             HR, B, standing              Supine Knee extension stretch   -  3' 2# lb weight  3' 2# weight          NV lvl 1 8'  x5- seat 8, no arms initially; needed at ~ 3 5 min      supine ham stretch     30" x3    [id number]                                                                                                                                 Ther Activity                                         Gait Training             Gait training with RW                           Modalities             CP PRN                           Progress as able

## 2023-01-06 ENCOUNTER — OFFICE VISIT (OUTPATIENT)
Dept: PHYSICAL THERAPY | Age: 58
End: 2023-01-06

## 2023-01-06 DIAGNOSIS — G89.29 CHRONIC PAIN OF LEFT KNEE: Primary | ICD-10-CM

## 2023-01-06 DIAGNOSIS — M17.12 PRIMARY OSTEOARTHRITIS OF LEFT KNEE: ICD-10-CM

## 2023-01-06 DIAGNOSIS — M25.562 CHRONIC PAIN OF LEFT KNEE: Primary | ICD-10-CM

## 2023-01-06 NOTE — PROGRESS NOTES
Daily Note     Today's date: 2023  Patient name: Nena Plascencia  : 1965  MRN: [id number]  Referring provider: Camila Godinez  Dx:   Encounter Diagnosis     ICD-10-CM    1  Chronic pain of left knee  M25 562     G89 29       2  Primary osteoarthritis of left knee  M17 12                      Subjective: Patient reports an increase in full body soreness, neck and shoulders, low back and B hands - thinks it's from her walking, using the walker, the pain that she's been having from her knee replacement surgery  Patient reports that she is doing her exercises, walking  Patient notes that she has also started Celebrex to help with her knee pain, which is helping  Objective: See treatment diary below      Assessment: Tolerated treatment well  Patient demonstrated fatigue post treatment, exhibited good technique with therapeutic exercises and would benefit from continued PT Patient making good progress with extension, still lacking full extension ~ -5 degrees extension today passively, lacks full extension (~ -10* actively) 2* VMO and quad weakness  Patient has been consistent with HEP at home, incision healing well  Flexion ~ 93 degrees today during manual stretching  Patient has been stretching at home in addition to PT  Swelling decreasing, patella still clicks with mobility 2* fluid  Patient using SPC only today, no signs of buckling during gait  Discussed progressions to balance and leg press NV if able  Plan: Continue per plan of care        Precautions: COPD, Anxiety, Depression, PTSD, FALL RISK    L TKA  Manuals    Knee PROM flex/ext to tolerance X 10'   Ext over the bolster x10'   ksg  x30' with manual work to knee  Extension stretching over bolster  x10 min knee flexion in supine and supine ext/hamstretch   Patellar mobs  5' x5'  ksg  10x ea direction  5 min                               Neuro Re-Ed                           SLS    NV trial firm       sidestepping             Tandem walking                                                       Ther Ex              Nu step  x10' L1 x10' L1     10min   QS long sitting  10x:05 10x:10  20 x5"  10x:05  [id number]   Ankle pumps  d/c   30 x       Heel slides  seated 2x10 Seated 2x10  30 x3"    seated & supine x20 w/pillow case   Sup hip abd  NT   2x10    2x10   SAQ  2x10 2x10  2x10    AAx10 +pain   LAQ  2x10 2x10  2x10    2x10    Step onto step for ROM  step touch 4+6" 2x10 8+6" 2x10      4in x10, 6in x10   Leg press B 2x10 10#  L 15x 10#   B 2x10 10#   SL 15x 10#  NV 20# B  B 2x10, L 1x10 w/#10   SLR x 3, start with L              Ham curls, start with L             HR, B, standing              Supine Knee extension stretch   -  3' 2# lb weight  3' 2# weight          NV lvl 1 8'  x5- seat 8, no arms initially; needed at ~ 3 5 min      supine ham stretch     30" x3    [id number]                                                                                                                                 Ther Activity                                         Gait Training             Gait training with RW                           Modalities             CP PRN    x5'                       Progress as able

## 2023-01-09 ENCOUNTER — OFFICE VISIT (OUTPATIENT)
Dept: PHYSICAL THERAPY | Age: 58
End: 2023-01-09

## 2023-01-09 DIAGNOSIS — M17.12 PRIMARY OSTEOARTHRITIS OF LEFT KNEE: ICD-10-CM

## 2023-01-09 DIAGNOSIS — M25.562 CHRONIC PAIN OF LEFT KNEE: Primary | ICD-10-CM

## 2023-01-09 DIAGNOSIS — G89.29 CHRONIC PAIN OF LEFT KNEE: Primary | ICD-10-CM

## 2023-01-09 NOTE — PROGRESS NOTES
Daily Note     Today's date: 2023  Patient name: Gayathri Newton  : 1965  MRN: [id number]  Referring provider: Tracey Greco  Dx:   Encounter Diagnosis     ICD-10-CM    1  Chronic pain of left knee  M25 562     G89 29       2  Primary osteoarthritis of left knee  M17 12                      Subjective: Patient reports that she is doing her exercises, walking  Patient notes that she has also started Celebrex to help with her knee pain, which is helping somewhat  Pt feels she may need a more of a dose  Pt note her whole leg is tight and hurts  Pt notes she is having a lot of trouble sleeping at night as her L leg is cramping and restless      Objective: See treatment diary below      Assessment: Tolerated treatment well  Patient demonstrated fatigue post treatment, exhibited good technique with therapeutic exercises and would benefit from continued PT Patient making good progress with extension, still lacking full extension ~ -5 degrees extension today passively,Swelling decreasing, patella still clicks with mobility 2* fluid  Moving toward more standing ex and functional ex to get motivated to return to more function and take focus off L knee pain  Will adjust ex based on pain response      Plan: Continue per plan of care        Precautions: COPD, Anxiety, Depression, PTSD, FALL RISK    L TKA  Manuals  1   Knee PROM flex/ext to tolerance X 10'   Ext over the bolster x10'  x10m  x30' with manual work to knee  Extension stretching over bolster  x10 min knee flexion in supine and supine ext/hamstretch   Patellar mobs  5' x5'   10x ea direction  5 min                               Neuro Re-Ed                           SLS    NV trial firm         sidestepping             Tandem walking                                                       Ther Ex              Nu step  x10' L1 x10' L1  10 min L1   10min   QS long sitting  10x:05 10x:10  20 x5"  10x:05  [id number]   Ankle pumps  d/c   30 x       Heel slides  seated 2x10 Seated 2x10  30 x3"    seated & supine x20 w/pillow case   Sup hip abd  NT      2x10   SAQ  2x10 2x10  3x10    AAx10 +pain   LAQ  2x10 2x10  3x10    2x10    Step onto step for ROM  step touch 4+6" 2x10 8+6" 2x10  12 in 2x10    4in x10, 6in x10   Leg press B 2x10 10#  L 15x 10#   B 2x10 10#   SL 15x 10#  B # 30 2x10  B 2x10, L 1x10 w/#10   Cybex hip abd   # 25# 2x10     SLR x 3, start with L       1 5# B 2x10       Ham curls, start with L      1 5# B 2x10       HR, B, standing      x20       Mini squats   x20      Supine Knee extension stretch   -  3' 2# lb weight        Rises from chair   NV 1 pad 2x10  x5- seat 8, no arms initially; needed at ~ 3 5 min      supine ham stretch     [id number]    [id number]                                                                                                                                 Ther Activity                                         Gait Training             Gait training with RW                           Modalities             CP PRN    x5'  x5 min                     Progress as able

## 2023-01-10 ENCOUNTER — TELEPHONE (OUTPATIENT)
Dept: OBGYN CLINIC | Facility: HOSPITAL | Age: 58
End: 2023-01-10

## 2023-01-10 DIAGNOSIS — M25.50 ARTHRALGIA, UNSPECIFIED JOINT: ICD-10-CM

## 2023-01-10 RX ORDER — CELECOXIB 100 MG/1
200 CAPSULE ORAL 2 TIMES DAILY
Qty: 120 CAPSULE | Refills: 2 | Status: SHIPPED | OUTPATIENT
Start: 2023-01-10 | End: 2023-01-18

## 2023-01-10 NOTE — TELEPHONE ENCOUNTER
Caller: patient  Doctor: Chaz Mooney    Reason for call: asking for higher dose of Celebrex,  PT causing her a lot of pain/losing sleep    Call back#: [phone]

## 2023-01-10 NOTE — TELEPHONE ENCOUNTER
S/w Matthew Barone  Advised of new RX with new instructions and dose  She understands new celebrex RX is to be taken" 2 capsules (200 mg total) by mouth 2 (two) times a day"  Denies other questions or needs at this time

## 2023-01-12 ENCOUNTER — OFFICE VISIT (OUTPATIENT)
Dept: PHYSICAL THERAPY | Age: 58
End: 2023-01-12

## 2023-01-12 DIAGNOSIS — M25.562 CHRONIC PAIN OF LEFT KNEE: Primary | ICD-10-CM

## 2023-01-12 DIAGNOSIS — M17.12 PRIMARY OSTEOARTHRITIS OF LEFT KNEE: ICD-10-CM

## 2023-01-12 DIAGNOSIS — G89.29 CHRONIC PAIN OF LEFT KNEE: Primary | ICD-10-CM

## 2023-01-12 NOTE — PROGRESS NOTES
Daily Note     Today's date: 2023  Patient name: Emi Marie  : 1965  MRN: [id number]  Referring provider: Brock Posada  Dx:   Encounter Diagnosis     ICD-10-CM    1  Chronic pain of left knee  M25 562     G89 29       2  Primary osteoarthritis of left knee  M17 12                      Subjective: Patient reports that she has been walking at home, using her cane at home and for community distances  Patient has been working on her ROM at home  Objective: See treatment diary below      Assessment: Tolerated treatment well  Patient demonstrated fatigue post treatment, exhibited good technique with therapeutic exercises and would benefit from continued PT  Continued with current program  Patient's strength is progressing well  Still tight and lacking full extension actively seen during gait  Tolerating progressive stretching well into extension  Plan: Continue per plan of care        Precautions: COPD, Anxiety, Depression, PTSD, FALL RISK    L TKA  Manuals    Knee PROM flex/ext to tolerance X 10'   Ext over the bolster x10'  x10m x5'  x10 min knee flexion in supine and supine ext/hamstretch   Patellar mobs  5' x5'    5 min                               Neuro Re-Ed                           SLS    NV trial firm         sidestepping        NV with weights     Tandem walking                                                       Ther Ex              Nu step  x10' L1 x10' L1  10 min L1 x10' L1  10min   QS long sitting  10x:05 10x:10  20 x5"  10x:05  [id number]   Ankle pumps  d/c   30 x  -     Heel slides  seated 2x10 Seated 2x10  30 x3"  -  seated & supine x20 w/pillow case   Sup hip abd  NT      2x10   SAQ  2x10 2x10  3x10  -  AAx10 +pain   LAQ  2x10 2x10  3x10  1 5# 2x10  2x10    Step onto step for ROM  step touch 4+6" 2x10 8+6" 2x10  12 in 2x10  8"+8" 2x10  4in x10, 6in x10   Leg press B 2x10 10#  L 15x 10#   B 2x10 10#   SL 15x 10#  B # 30 2x10 - B 2x10, L 1x10 w/#10   Cybex hip abd   # 25# 2x10  -    SLR x 3, start with L       1 5# B 2x10  1 5# 2x10     Ham curls, start with L      1 5# B 2x10  1 5# 2x10     HR, B, standing      x20  x20     Mini squats   x20 x20     Supine Knee extension stretch   -  3' 2# lb weight        Rises from chair   NV 1 pad 2x10  1 pad 2x10      supine ham stretch     [id number]  -  [id number]                                                                                                                                 Ther Activity                                         Gait Training             Gait training with RW                           Modalities             CP PRN    x5'  x5 min                     Progress as able

## 2023-01-17 ENCOUNTER — OFFICE VISIT (OUTPATIENT)
Dept: INTERNAL MEDICINE CLINIC | Facility: CLINIC | Age: 58
End: 2023-01-17

## 2023-01-17 ENCOUNTER — OFFICE VISIT (OUTPATIENT)
Dept: PHYSICAL THERAPY | Age: 58
End: 2023-01-17

## 2023-01-17 VITALS
HEIGHT: 63 IN | HEART RATE: 75 BPM | WEIGHT: 124 LBS | OXYGEN SATURATION: 97 % | SYSTOLIC BLOOD PRESSURE: 122 MMHG | BODY MASS INDEX: 21.97 KG/M2 | DIASTOLIC BLOOD PRESSURE: 68 MMHG

## 2023-01-17 DIAGNOSIS — G25.81 RESTLESS LEG SYNDROME: ICD-10-CM

## 2023-01-17 DIAGNOSIS — F17.210 SMOKING GREATER THAN 20 PACK YEARS: ICD-10-CM

## 2023-01-17 DIAGNOSIS — Z12.4 SCREENING FOR CERVICAL CANCER: ICD-10-CM

## 2023-01-17 DIAGNOSIS — F41.9 ANXIETY DISORDER, UNSPECIFIED TYPE: ICD-10-CM

## 2023-01-17 DIAGNOSIS — F32.A DEPRESSION, UNSPECIFIED DEPRESSION TYPE: ICD-10-CM

## 2023-01-17 DIAGNOSIS — G89.29 CHRONIC PAIN OF LEFT KNEE: Primary | ICD-10-CM

## 2023-01-17 DIAGNOSIS — Z12.2 ENCOUNTER FOR SCREENING FOR LUNG CANCER: ICD-10-CM

## 2023-01-17 DIAGNOSIS — F17.200 SMOKING: ICD-10-CM

## 2023-01-17 DIAGNOSIS — Z23 ENCOUNTER FOR IMMUNIZATION: ICD-10-CM

## 2023-01-17 DIAGNOSIS — J44.9 CHRONIC OBSTRUCTIVE PULMONARY DISEASE, UNSPECIFIED COPD TYPE (HCC): ICD-10-CM

## 2023-01-17 DIAGNOSIS — M25.562 CHRONIC PAIN OF LEFT KNEE: Primary | ICD-10-CM

## 2023-01-17 DIAGNOSIS — J45.20 MILD INTERMITTENT REACTIVE AIRWAY DISEASE WITHOUT COMPLICATION: ICD-10-CM

## 2023-01-17 DIAGNOSIS — M17.12 PRIMARY OSTEOARTHRITIS OF LEFT KNEE: ICD-10-CM

## 2023-01-17 DIAGNOSIS — K58.0 IRRITABLE BOWEL SYNDROME WITH DIARRHEA: ICD-10-CM

## 2023-01-17 DIAGNOSIS — Z76.89 ENCOUNTER TO ESTABLISH CARE: Primary | ICD-10-CM

## 2023-01-17 RX ORDER — PREGABALIN 25 MG/1
25 CAPSULE ORAL
Qty: 30 CAPSULE | Refills: 1 | Status: SHIPPED | OUTPATIENT
Start: 2023-01-17

## 2023-01-17 RX ORDER — METHOCARBAMOL 500 MG/1
500 TABLET, FILM COATED ORAL 4 TIMES DAILY
Qty: 30 TABLET | Refills: 0 | Status: SHIPPED | OUTPATIENT
Start: 2023-01-17

## 2023-01-17 NOTE — ASSESSMENT & PLAN NOTE
Patient has a longstanding history of depression since childhood  PHQ-9 score of 11  Not currently on any medication  Admits to intermittent suicidal ideation thoughts without any concrete plans  No current thoughts in the past 2 weeks  Patient follows up with a therapist twice a month  Plan:  Consider starting antidepressant medication  Patient is agreeable  Courage to call 911 or go to the nearest emergency room if suicidal ideations and plans worsen

## 2023-01-17 NOTE — ASSESSMENT & PLAN NOTE
Had PFTs done last year  Records are not available  On Atrovent neb BID  On albuterol inhaler prn  On Mucinex DM qhs    Plan:  Continue current regimen

## 2023-01-17 NOTE — ASSESSMENT & PLAN NOTE
1/2 ppd  X 40 years current smoker  Used to smoke  ppd for over 20 years  CT lung cancer screening was 2/21/22    Plan:  CT low dose Lung cancer screening  Smoking cessation encouraged  Patient declined setting a quit date  But stated that she is cutting back slowly

## 2023-01-17 NOTE — ASSESSMENT & PLAN NOTE
S/S for 3 years  Usually starts around 6 pm and prevents going to sleep if awakened at night to go to the bathroom  Restless in bed  Plan:  Lyrica 50 mg qhs  Consider switching to gabapentin if no insurance coverage for Lyrica

## 2023-01-17 NOTE — ASSESSMENT & PLAN NOTE
S/P left knee replacement on 12/14/22  Healing well  PT BID    Plan:  Continue Celebrex 200 mg po bid  Continue Robaxin 500 mg po qhs  She has cut back from qid

## 2023-01-17 NOTE — PROGRESS NOTES
Daily Note     Today's date: 2023  Patient name: Diallo Ware  : 1965  MRN: [id number]  Referring provider: Bassam Vigil  Dx:   Encounter Diagnosis     ICD-10-CM    1  Chronic pain of left knee  M25 562     G89 29       2  Primary osteoarthritis of left knee  M17 12                      Subjective: Patient reports that she has been walking at home, using her cane at home and for community distances  Patient has been working on her ROM at home  Pt has been dealing with back and rib pain issues on her L side that affects her mobility and ex  Pt has a PCP appt coming up and may address that pain if it persists      Objective: See treatment diary below      Assessment: Tolerated treatment well  Patient demonstrated fatigue post treatment, exhibited good technique with therapeutic exercises and would benefit from continued PT  Continued with current program  Patient's strength is progressing well  Pt is showing more confidence with gait and ex with less apprehension noted  Still tight and lacking full extension actively seen during gait  Tolerating progressive stretching well into extension  Plan: Continue per plan of care        Precautions: COPD, Anxiety, Depression, PTSD, FALL RISK    L TKA  Manuals    Knee PROM flex/ext to tolerance X 10'   Ext over the bolster x10'  x10m x5' x10m   Patellar mobs  5' x5'                                   Neuro Re-Ed                           SLS    NV trial firm         sidestepping        NV with weights     Tandem walking                                                       Ther Ex              Nu step  x10' L1 x10' L1  10 min L1 x10' L1  10min   QS long sitting  10x:05 10x:10  20 x5"  10x:05  [id number]   Ankle pumps  d/c   30 x  -     Heel slides  seated 2x10 Seated 2x10  30 x3"  -    Sup hip abd  NT      2x10   SAQ  2x10 2x10  3x10  -  AAx10 +pain   LAQ  2x10 2x10  3x10  1 5# 2x10  1 5# 2x10    Step onto step for ROM  step touch 4+6" 2x10 8+6" 2x10  12 in 2x10  8"+8" 2x10    Step ups     4in fwd & lat x15   Leg press B 2x10 10#  L 15x 10#   B 2x10 10#   SL 15x 10#  B # 30 2x10 -    Cybex hip abd   # 25# 2x10  -    SLR x 3, start with L       1 5# B 2x10  1 5# 2x10  1 5# 2x10   Ham curls, start with L      1 5# B 2x10  1 5# 2x10  1 5# 2x10   HR, B, standing      x20  x20  x20   Mini squats   x20 x20 x20    Supine Knee extension stretch   -  3' 2# lb weight        Rises from chair   NV 1 pad 2x10  1 pad 2x10  1# 2x10    supine ham stretch     [id number]  -  [id number]                                                                                                                                 Ther Activity                                         Gait Training             Gait training with RW                           Modalities             CP PRN    x5'  x5 min                     Progress as able

## 2023-01-17 NOTE — PROGRESS NOTES
INTERNAL MEDICINE INITIAL OFFICE VISIT  [facility]    NAME: Glenys Huber  AGE: 62 y o  SEX: female  : 1965     DATE: 2023     Assessment and Plan:     Restless leg syndrome  S/S for 3 years  Usually starts around 6 pm and prevents going to sleep if awakened at night to go to the bathroom  Restless in bed  Plan:  Lyrica 50 mg qhs  Consider switching to gabapentin if no insurance coverage for Lyrica  Smoking  1/2 ppd  X 40 years current smoker  Used to smoke  ppd for over 20 years  CT lung cancer screening was 22    Plan:  CT low dose Lung cancer screening  Smoking cessation encouraged  Patient declined setting a quit date  But stated that she is cutting back slowly  Primary osteoarthritis of left knee  S/P left knee replacement on 22  Healing well  PT BID    Plan:  Continue Celebrex 200 mg po bid  Continue Robaxin 500 mg po qhs  She has cut back from qid  COPD (chronic obstructive pulmonary disease) ([facility] [address] )  Had PFTs done last year  Records are not available  On Atrovent neb BID  On albuterol inhaler prn  On Mucinex DM qhs    Plan:  Continue current regimen  Irritable bowel syndrome with diarrhea  Diarrhea predominant usually TID loose stools without blood  On imodium qd    Plan:  Continue imodium  Reactive airway disease  On Xyzal in the spring as needed  Continue Xyzal seasonally    Anxiety disorder  Plan:  Continue Ativan 0 5 mg p o  daily at bedtime  Depression  Patient has a longstanding history of depression since childhood  PHQ-9 score of 11  Not currently on any medication  Admits to intermittent suicidal ideation thoughts without any concrete plans  No current thoughts in the past 2 weeks  Patient follows up with a therapist twice a month  Plan:  Consider starting antidepressant medication  Patient is agreeable    Courage to call 911 or go to the nearest emergency room if suicidal ideations and plans worsen  Return in about 6 weeks (around 2/28/2023) for Next scheduled follow up  Chief Complaint:     Chief Complaint   Patient presents with   • Restless Leg   • Back Pain      History of Present Illness:     Mrs Deborah Merrill is a 63 yo Female with significant past medical history ofReactive airway disease, COPD not on oxygen, IBD diarrhea predominance on Imodium, recent left knee replacement due to osteoarthritis (2022), congenital subependymal gray matter heterotopia, cholesteatoma of attic of right ear, memory difficulties, PTSD depression anxiety remote history of alcohol and drug abuse 27 years ago, current smoker that presented to the office to establish care  She also would like to get medication for her Restless Leg Syndrome that is currently affecting her left leg for the past month  Patient states that she has had intermittent restless leg syndrome in both legs for several years and before she was having worse trouble with the right leg  Since her surgery on her left knee about a month ago, 12/22, patient has had restless leg symptoms in her left leg  Symptoms started about 6 PM and has minute twitches and an urge to move her legs  When asleep patient states that she has restless leg syndrome that prevents her from falling back asleep if she wakes up in the middle of the night  She has intermittent muscle cramps associated with her restless leg syndrome and has been on Robaxin however is running out of her Robaxin and would like a refill  Patient admits to having her vaccinations up to date and followed up with her primary care provider Dr Manuel Garg, however her insurance does not cover his practice anymore and that is why she is transitioning to a new office  Patient states that her mammogram, colonoscopy, vaccines and yearly screenings are current except for her Pap smear  She states that she is due for another Pap smear      The following portions of the patient's history were reviewed and updated as appropriate: allergies, current medications, past family history, past medical history, past social history, past surgical history and problem list      Review of Systems:     Review of Systems   Constitutional: Negative for activity change, appetite change, chills, fatigue, fever and unexpected weight change  HENT: Negative for congestion  Eyes: Negative for visual disturbance  Respiratory: Negative for shortness of breath  Cardiovascular: Negative for chest pain and palpitations  Gastrointestinal: Negative for abdominal pain, constipation, diarrhea, nausea and vomiting  Genitourinary: Negative for difficulty urinating  Musculoskeletal: Positive for arthralgias (Left knee) and myalgias (Left leg with restlessness  )  Negative for joint swelling and neck pain  Skin: Positive for wound (Well-healed left knee incision from left knee replacement surgery  )  Negative for rash  Neurological: Negative for dizziness, weakness, numbness and headaches  Hematological: Does not bruise/bleed easily  Psychiatric/Behavioral: Positive for suicidal ideas (Without plan intermittently  No current ideations at this visit over the past 2 weeks  )  Negative for agitation, behavioral problems and confusion          Past Medical History:     Past Medical History:   Diagnosis Date   • Alcoholism ([facility] ) 95/18    recovering alcoholic, drug addict/compulsive over-eater   • Anemia    • Anxiety    • Arthritis    • Callus    • Complete Tear of the Anterior cruciate ligament tear of the knee    • COPD (chronic obstructive pulmonary disease) (HCC)    • Depression    • Difficulty walking    • Exposure to hepatitis B     Resolved: 17   • History of cholesteatoma     right ear   • History of COVID-19 2022    mild s/s   • History of prior pregnancies     A1   • Irritable bowel syndrome    • Lung disease    • Papanicolaou smear 2010    NEG   • Plantar fasciitis    • PONV (postoperative nausea and vomiting)    • Reactive airway disease     Last assessed: 9/14/16        Past Surgical History:     Past Surgical History:   Procedure Laterality Date   • BREAST CYST ASPIRATION Right 1993   • COLONOSCOPY     • CYSTECTOMY Left     benign cystectomy below breast   • DILATION AND CURETTAGE OF UTERUS     • FACIAL RECONSTRUCTION SURGERY     • FOOT SURGERY Right     endoscopic plantar fasciotomy   • GUM SURGERY  09/2019    with bone graft   • KNEE ARTHROSCOPY Left 05/1997    menisectomy   • KNEE ARTHROSCOPY Left 10/1997    ACL repair   • MAMMO (HISTORICAL) Bilateral 09/13/2010    benign   • MYRINGOTOMY W/ TUBES Right 03/01/2019    Procedure: MYRINGOTOMY W/ INSERTION VENTILATION TUBE EAR;  Surgeon: Nicole Serrano DO;  Location: [facility] MAIN [facility];  Service: ENT   • NH ARTHRP KNE CONDYLE&PLATU MEDIAL&LAT COMPARTMENTS Left 12/14/2022    Procedure: ARTHROPLASTY KNEE TOTAL W ROBOT with screw removal left proximal tibia; Surgeon: Nakia Reynoso MD;  Location: [facility] MA[facility];  Service: Orthopedics   • NH CORRECTION HAMMERTOE Right 02/04/2022    Procedure: REPAIR HAMMERTOE 2nd;  Surgeon: Natalie Bermudez DPM;  Location: [address] [place] [address] MAIN OR;  Service: Podiatry   • NH SURGICAL ARTHROSCOPY SHOULDER W/ROTATOR CUFF RPR Left 01/31/2018    Procedure: SHOULDER ARTHROSCOPY ROTATOR CUFF REPAIR; SUBACROMIAL DECOMPRESSION;  Surgeon: Liliya Bullock MD;  [facility];  Service: Orthopedics   • NH TMPP MASTOIDECT NTC/RCNSTED WALL W/O OCR Right 03/01/2019    Procedure: TYMPANOPLASTY;  Surgeon: Nicole Serrano DO;  Location: [facility];  Service: ENT   • SHOULDER SURGERY     • TONSILLECTOMY AND ADENOIDECTOMY     • TUBAL LIGATION     • VAGINAL DELIVERY  1987   • WISDOM TOOTH EXTRACTION          Social History:   She reports that she has been smoking cigarettes  She started smoking about 43 years ago  She has a 60 00 pack-year smoking history   She has never used smokeless tobacco  She reports that she does not currently use alcohol  Patient quit using alcohol 27 years ago  She describes her self as a recovering alcoholic  She reports that she does not currently use drugs after having used the following drugs: Cocaine and "Crack" cocaine, speed, hallucinogens, marijuana last use of illicit substances was 27 years ago  Patient is a massage therapist currently  She is currently not working due to left knee surgery  Patient is  with 1 son  Lives at home with   Sexually active  Postmenopausal   Eats a healthy diet and exercises in the gym at least 2 times a week  Currently in PT twice a week due to left knee surgery       Family History:     Family History   Problem Relation Age of Onset   • Mental illness Mother    • Substance Abuse Mother    • Lung cancer Mother    • Cancer Mother            • Hypertension Father         Benign Essential   • Cataracts Father    • Diabetes Father            • Mental illness Father    • Hyperlipidemia Father    • Depression Father    • Cancer Father         GIST-    • Schizophrenia Sister    • Squamous cell carcinoma Sister    • Diabetes Sister    • Hypertension Sister    • Mental illness Sister    • Cancer Sister    • Cancer Maternal Grandmother            • Diabetes Maternal Grandmother    • Heart failure Maternal Grandfather    • Lung cancer Paternal Grandmother    • Hyperlipidemia Paternal Grandmother    • Cancer Paternal Grandmother            • Hyperlipidemia Paternal Grandfather    • No Known Problems Son    • Pulmonary fibrosis Paternal Aunt    • Schizophrenia Paternal [de-identified]    • Diabetes Paternal Aunt    • Diabetes Brother    • Hypertension Brother    • Hyperlipidemia Brother    • Diabetes Brother    • Hypertension Brother    • Diabetes Brother         possibly one or both   • Mental illness Paternal Aunt         Current Medications:     Current Outpatient Medications:   •  albuterol (PROVENTIL HFA,VENTOLIN HFA) 90 mcg/act inhaler, Inhale 2 puffs every 4 (four) hours as needed, Disp: , Rfl:   •  celecoxib (CeleBREX) 100 mg capsule, Take 2 capsules (200 mg total) by mouth 2 (two) times a day for 180 doses, Disp: 120 capsule, Rfl: 2  •  dextromethorphan-guaifenesin (MUCINEX DM)  MG per 12 hr tablet, in the morning, Disp: , Rfl:   •  ferrous sulfate 324 (65 Fe) mg, Take one tablet daily  , Disp: 30 tablet, Rfl: 0  •  ipratropium (ATROVENT) 0 02 % nebulizer solution, Take 0 5 mg by nebulization 2 (two) times a day, Disp: , Rfl:   •  Loperamide HCl (IMODIUM PO), Take 2 mg by mouth in the morning  , Disp: , Rfl:   •  LORazepam (ATIVAN) 0 5 mg tablet, Take 1 tablet (0 5 mg total) by mouth daily at bedtime, Disp: 30 tablet, Rfl: 2  •  MELATONIN PO, Take 10 mg by mouth daily at bedtime, Disp: , Rfl:   •  methocarbamol (ROBAXIN) 500 mg tablet, Take 1 tablet (500 mg total) by mouth 4 (four) times a day, Disp: 30 tablet, Rfl: 0  •  Multiple Minerals-Vitamins (ROSLYN-MAG-ZINC-D PO), Take 1 tablet by mouth daily, Disp: , Rfl:   •  Multiple Vitamins-Minerals (multivitamin with minerals) tablet, Take 1 tablet by mouth daily, Disp: , Rfl:   •  pregabalin (LYRICA) 25 mg capsule, Take 1 capsule (25 mg total) by mouth daily at bedtime, Disp: 30 capsule, Rfl: 1  •  zolpidem (AMBIEN) 10 mg tablet, TAKE ONE TABLET BY MOUTH AT BEDTIME IF NEEDED FOR SLEEP, Disp: 30 tablet, Rfl: 5     Allergies: Allergies   Allergen Reactions   • Percocet [Oxycodone-Acetaminophen] GI Intolerance        Physical Exam:     /68 (BP Location: Left arm, Patient Position: Sitting, Cuff Size: Standard)   Pulse 75   Ht 5' 3" (1 6 m)   Wt 56 2 kg (124 lb)   LMP 03/01/2019 (Within Weeks)   SpO2 97%   BMI 21 97 kg/m²     Physical Exam  Vitals and nursing note reviewed  Constitutional:       General: She is not in acute distress  Appearance: Normal appearance  She is not ill-appearing, toxic-appearing or diaphoretic     HENT:      Head: Normocephalic and atraumatic  Nose: Nose normal       Mouth/Throat:      Mouth: Mucous membranes are moist       Pharynx: No oropharyngeal exudate or posterior oropharyngeal erythema  Eyes:      General: No scleral icterus  Right eye: No discharge  Left eye: No discharge  Pupils: Pupils are equal, round, and reactive to light  Neck:      Vascular: No carotid bruit  Cardiovascular:      Rate and Rhythm: Normal rate and regular rhythm  Pulses: Normal pulses  Heart sounds: Normal heart sounds  No murmur heard  No friction rub  No gallop  Pulmonary:      Effort: Pulmonary effort is normal  No respiratory distress  Breath sounds: Normal breath sounds  No stridor  No wheezing, rhonchi or rales  Abdominal:      General: Bowel sounds are normal       Palpations: Abdomen is soft  Tenderness: There is no abdominal tenderness  There is no right CVA tenderness, left CVA tenderness, guarding or rebound  Musculoskeletal:         General: Tenderness (Left knee with limited range of motion ) present  No swelling or deformity  Cervical back: Normal range of motion and neck supple  No rigidity  No muscular tenderness  Right lower leg: No edema  Left lower leg: No edema  Lymphadenopathy:      Cervical: No cervical adenopathy  Skin:     General: Skin is warm and dry  Capillary Refill: Capillary refill takes less than 2 seconds  Coloration: Skin is not jaundiced  Findings: No lesion or rash  Neurological:      General: No focal deficit present  Mental Status: She is alert and oriented to person, place, and time  Mental status is at baseline  Motor: No weakness  Gait: Gait abnormal (Limbs and walks with a cane due to recent left knee surgery  )  Psychiatric:         Mood and Affect: Mood normal          Behavior: Behavior normal          Thought Content:  Thought content normal          Judgment: Judgment normal         Nutrition Assessment and Intervention:     Reviewed food recall journal    Recommended completion of food recall journal      Physical Activity Assessment and Intervention:    Activity journal reviewed    Activity journal completion recommended      Emotional and Mental Well-being, Sleep, Connectedness Assessment and Intervention:    Sleep/stress assessment performed    Depression and anxiety screening performed and reviewed      Tobacco and Toxic Substance Assessment and Intervention:     Tobacco use screening performed    Alcohol and drug use screening performed         Data:     Laboratory Results: I have personally reviewed the pertinent laboratory results/reports      Lab Results   Component Value Date    WBC 12 07 (H) 12/15/2022    HGB 11 4 (L) 12/15/2022    HCT 35 6 12/15/2022    MCV 98 12/15/2022    RDW 12 8 12/15/2022     12/15/2022     Lab Results   Component Value Date    SODIUM 139 12/15/2022    K 4 6 12/15/2022     12/15/2022    CO2 31 12/15/2022    BUN 18 12/15/2022    CREATININE 0 66 12/15/2022    GLUC 107 12/15/2022    GLUF 88 11/11/2022    CALCIUM 8 7 12/15/2022    EGFR 98 12/15/2022      Lab Results   Component Value Date    HGBA1C 5 2 11/11/2022     Lab Results   Component Value Date    MSRVJIWD84 1,991 (H) 08/24/2018     Lab Results   Component Value Date    CTN0ZBTASICW 0 75 02/11/2016    FREET4 1 0 02/11/2016       Radiology/Other Diagnostic Testing Results: I have personally reviewed pertinent reports  XR knee 1 or 2 vw left Result Date: 12/25/2022 Impression: Satisfactory appearance of total knee arthroplasty       Odilia Farley MD  [facility]

## 2023-01-17 NOTE — ASSESSMENT & PLAN NOTE
Diarrhea predominant usually TID loose stools without blood  On imodium qd    Plan:  Continue imodium

## 2023-01-18 ENCOUNTER — TELEPHONE (OUTPATIENT)
Dept: OBGYN CLINIC | Facility: HOSPITAL | Age: 58
End: 2023-01-18

## 2023-01-18 DIAGNOSIS — M25.50 ARTHRALGIA, UNSPECIFIED JOINT: ICD-10-CM

## 2023-01-18 RX ORDER — CELECOXIB 200 MG/1
200 CAPSULE ORAL 2 TIMES DAILY
Qty: 180 CAPSULE | Refills: 0 | Status: SHIPPED | OUTPATIENT
Start: 2023-01-18 | End: 2023-04-26 | Stop reason: CLARIF

## 2023-01-18 NOTE — TELEPHONE ENCOUNTER
Caller: Patient    Doctor: Huseyni Hammonds    Reason for call: Celebrex Rx needs to be 200 mg  Not 100 mg    Pharmacy will not fill at 100 mg      Oz Ferguson    Call back#: [phone]

## 2023-01-19 ENCOUNTER — OFFICE VISIT (OUTPATIENT)
Dept: GYNECOLOGY | Facility: CLINIC | Age: 58
End: 2023-01-19

## 2023-01-19 VITALS
DIASTOLIC BLOOD PRESSURE: 80 MMHG | WEIGHT: 122 LBS | HEIGHT: 63 IN | BODY MASS INDEX: 21.62 KG/M2 | SYSTOLIC BLOOD PRESSURE: 122 MMHG

## 2023-01-19 DIAGNOSIS — Z01.419 ROUTINE GYNECOLOGICAL EXAMINATION: Primary | ICD-10-CM

## 2023-01-19 DIAGNOSIS — Z12.4 SCREENING FOR CERVICAL CANCER: ICD-10-CM

## 2023-01-19 DIAGNOSIS — Z11.51 SCREENING FOR HPV (HUMAN PAPILLOMAVIRUS): ICD-10-CM

## 2023-01-19 NOTE — PROGRESS NOTES
Assessment/Plan:    Normal breast and GYN exam  Normal Pap smear negative HPV November 2019  Normal mammogram June 2022  Normal colonoscopy September 2020  Tobacco abuse 1/2 pack/day down from 1 pack/day  COVID vaccine and booster  COVID infection July 2022  Status post left knee replacement  Flu vaccine December 2022    Plan: Check Pap smear  Encouraged to quit smoking  Continue healthy diet and exercise  Rx mammogram     Subjective: [id number]     Patient ID: Shelly Ornelas is a 62 y o  female presents for yearly exam with no complaints  Patient was last seen in 2019 at which time she had a normal Pap smear  Status post left knee replacement this past year  Doing well  Denies any pelvic pain or vaginal bleeding  Denies any dyspareunia  Denies any breast bowel or bladder issues  No change in family history  Medications reviewed  Working part-time as a massage therapist   Retired from her full-time job as a physical therapist       Review of Systems   Constitutional: Negative  Negative for fatigue, fever and unexpected weight change  HENT: Negative  Eyes: Negative  Respiratory: Negative  Negative for chest tightness, shortness of breath, wheezing and stridor  Cardiovascular: Negative  Negative for chest pain, palpitations and leg swelling  Gastrointestinal: Negative  Negative for abdominal pain, blood in stool, diarrhea, nausea, rectal pain and vomiting  Endocrine: Negative  Genitourinary: Negative for dysuria, frequency, vaginal bleeding, vaginal discharge and vaginal pain  Musculoskeletal: Negative  Skin: Negative  Allergic/Immunologic: Negative  Neurological: Negative  Hematological: Negative  Psychiatric/Behavioral: Negative  All other systems reviewed and are negative          Objective:      /80   Ht 5' 3" (1 6 m)   Wt 55 3 kg (122 lb)   LMP 03/01/2019 (Within Weeks)   BMI 21 61 kg/m²          Physical Exam  Constitutional:       Appearance: She is well-developed  HENT:      Head: Normocephalic and atraumatic  Neck:      Thyroid: No thyromegaly  Trachea: No tracheal deviation  Cardiovascular:      Rate and Rhythm: Normal rate and regular rhythm  Heart sounds: Normal heart sounds  Pulmonary:      Effort: Pulmonary effort is normal  No respiratory distress  Breath sounds: Normal breath sounds  No stridor  No wheezing or rales  Chest:      Chest wall: No tenderness  Breasts:     Breasts are symmetrical       Right: No inverted nipple, mass, nipple discharge, skin change or tenderness  Left: No inverted nipple, mass, nipple discharge, skin change or tenderness  Abdominal:      General: Bowel sounds are normal  There is no distension  Palpations: Abdomen is soft  There is no mass  Tenderness: There is no abdominal tenderness  There is no guarding or rebound  Hernia: No hernia is present  There is no hernia in the left inguinal area  Genitourinary:     Labia:         Right: No rash, tenderness, lesion or injury  Left: No rash, tenderness, lesion or injury  Vagina: Normal  No signs of injury and foreign body  No vaginal discharge, erythema, tenderness or bleeding  Cervix: No cervical motion tenderness, discharge or friability  Uterus: Not deviated, not enlarged, not fixed and not tender  Adnexa:         Right: No mass, tenderness or fullness  Left: No mass, tenderness or fullness  Rectum: No mass, anal fissure, external hemorrhoid or internal hemorrhoid  Musculoskeletal:      Cervical back: Normal range of motion and neck supple  Lymphadenopathy:      Lower Body: No right inguinal adenopathy  No left inguinal adenopathy  Skin:     General: Skin is warm and dry  Neurological:      Mental Status: She is alert and oriented to person, place, and time  Psychiatric:         Behavior: Behavior normal          Thought Content:  Thought content normal  Judgment: Judgment normal

## 2023-01-20 ENCOUNTER — OFFICE VISIT (OUTPATIENT)
Dept: PHYSICAL THERAPY | Age: 58
End: 2023-01-20

## 2023-01-20 ENCOUNTER — TELEPHONE (OUTPATIENT)
Dept: OTHER | Facility: OTHER | Age: 58
End: 2023-01-20

## 2023-01-20 DIAGNOSIS — G89.29 CHRONIC PAIN OF LEFT KNEE: ICD-10-CM

## 2023-01-20 DIAGNOSIS — M17.12 PRIMARY OSTEOARTHRITIS OF LEFT KNEE: Primary | ICD-10-CM

## 2023-01-20 DIAGNOSIS — M25.562 CHRONIC PAIN OF LEFT KNEE: ICD-10-CM

## 2023-01-20 NOTE — TELEPHONE ENCOUNTER
Patient advised that she needs to go to [facility] for her CT scan due to insurance and they need a form and the lab order faxed to them at:    [phone]

## 2023-01-20 NOTE — PROGRESS NOTES
Daily Note     Today's date: 2023  Patient name: Aleksander Frazier  : 1965  MRN: [id number]  Referring provider: Moustapha Trevino  Dx:   Encounter Diagnosis     ICD-10-CM    1  Primary osteoarthritis of left knee  M17 12       2  Chronic pain of left knee  M25 562     G89 29                      Subjective: Patient reports that she has been walking at home, wihtout cane at home and cane for community distances  Patient has been working on her ROM at home  Pt has been dealing with back and rib pain issues on her L side that affects her mobility and ex  Pt has a PCP appt coming up and may address that pain if it persists      Objective: See treatment diary below      Assessment: Tolerated treatment well  Patient demonstrated fatigue post treatment, exhibited good technique with therapeutic exercises and would benefit from continued PT  Continued with current program  Patient's strength is progressing well  Pt is showing more confidence with gait and ex with less apprehension noted as pt progressing with Cybex and upper level activites  Still tight and lacking full extension actively seen during gait  Tolerating progressive stretching well into extension  Plan: Continue per plan of care        Precautions: COPD, Anxiety, Depression, PTSD, FALL RISK    L TKA  Manuals    Knee PROM flex/ext to tolerance X 10'    x10'  x10m x5' x10m   Patellar mobs  5' x5'                                   Neuro Re-Ed                           SLS    NV trial firm         sidestepping        NV with weights     Tandem walking                                                       Ther Ex              Nu step  x10' L1 x10' L1  10 min L1 x10' L1  10min   QS long sitting  10x:05 10x:10  20 x5"  10x:05  [id number]   Ankle pumps    30 x  -     Heel slides  Seated 2x10  30 x3"  -    Sup hip abd  NT      2x10   SAQ 3#2x10 2x10  3x10  -  AAx10 +pain   LAQ 3# 2x10 2x10  3x10  1 5# 2x10  1 5# 2x10    Step onto step for ROM   8+6" 2x10  12 in 2x10  8"+8" 2x10    Step ups 6 inx20    4in fwd & lat x15   Cybex ham curls #15 2x10       Leg press 85 # 2x10 B, 35# L 2x10   B 2x10 10#   SL 15x 10#  B # 30 2x10 -    Cybex hip abd 35# 2x10  # 25# 2x10  -    SLR x 3, start with L   2# B 2x10    1 5# B 2x10  1 5# 2x10  1 5# 2x10   Ham curls, start with L  2# B 2x10    1 5# B 2x10  1 5# 2x10  1 5# 2x10   HR, B, standing  2x10    x20  x20  x20   Mini squats 2x10  x20 x20 x20    Supine Knee extension stretch   -  3' 2# lb weight        Rises from chair  1 pad 2x10 NV 1 pad 2x10  1 pad 2x10  1# 2x10    supine ham stretch     20nmia1  -  [id number]                                                                                                                                 Ther Activity                                         Gait Training             Gait training with RW                           Modalities             CP PRN    x5'  x5 min                     Progress as able

## 2023-01-23 ENCOUNTER — OFFICE VISIT (OUTPATIENT)
Dept: PHYSICAL THERAPY | Age: 58
End: 2023-01-23

## 2023-01-23 DIAGNOSIS — M17.12 PRIMARY OSTEOARTHRITIS OF LEFT KNEE: Primary | ICD-10-CM

## 2023-01-23 DIAGNOSIS — G89.29 CHRONIC PAIN OF LEFT KNEE: ICD-10-CM

## 2023-01-23 DIAGNOSIS — M25.562 CHRONIC PAIN OF LEFT KNEE: ICD-10-CM

## 2023-01-23 NOTE — PROGRESS NOTES
PT Re-Evaluation    Today's date: 2023   Patient name: Yasir Pham  : 1965  MRN: [id number]  Referring provider: Khanh Deshpande  Dx:   Encounter Diagnosis     ICD-10-CM    1  Primary osteoarthritis of left knee  M17 12       2  Chronic pain of left knee  M25 562     G89 29                      Assessment  Assessment details: Patient seen for PT re-assessment  Patient is continuing to make great progress towards goals, still tight into extension both actively and passively; still also having some swelling at the knee as well  Patient has been consistent with her HEP at home, is elevating her leg and has progressed to using a SPC  Patient is making good progress overall with PT treatment, progressing program to work on her balance today  Patient planning to discuss aqua therapy for knee- recommended patient to discuss with surgeon  Patient also curious about going back to the gym, for upper body and light lower body strengthening  PT has initiated some strengthening machines, lower weight to focus on the ROM  Patient would be safe to return to the gym- pending discussion with surgeon after surgery  PT also recommending patient to continue with PT for 2x/week x additional 4-6 weeks  Impairments: abnormal gait, abnormal or restricted ROM, impaired balance, impaired physical strength, lacks appropriate home exercise program and pain with function  Functional limitations: Knee pain and limitations with squats, IADLs, recreational activities, limitations with stairs, standing and walking tolerance  Goals  Impairment Goals to be met within 4 weeks    - Decrease pain to 5/10 post-op L knee partially met  - Improve ROM to 90 degrees flexion, active met  - Increase ROM to 0 Degrees extension, active not met  - Improve PROM by 5-10 degrees flex, ext met for flex, not met for ext  - Increase strength to 3+/5 throughout met  - Reduce edema by 50% L LE  met    Functional Goals to be met within 4-6 weeks  - Return to Prior Level of Function progressing  - Increase Functional Status Measure to: expected progressing  - Patient will be independent with HEP met  - Patient to be I with use of RW met  - initiate gait training with [facility] when applicable met  - Restore appropriate gait pattern with use of assisted device  Progressing    Goals added  - Progress LE strength to 4+/5 grossly throughout  - Progress AROM extension to -5 degrees  - Progress PROM to 0 degrees extension  - Progress flexion by 5-10 degrees       Plan  Patient would benefit from: skilled physical therapy  Planned modality interventions: TENS, thermotherapy: hydrocollator packs, cryotherapy and unattended electrical stimulation  Planned therapy interventions: joint mobilization, manual therapy, massage, neuromuscular re-education, patient education, postural training, self care, strengthening, stretching, therapeutic activities, therapeutic exercise, balance, balance/weight bearing training, abdominal trunk stabilization, dressing changes, flexibility, functional ROM exercises, gait training, graded exercise, home exercise program and body mechanics training  Frequency: 2x week  Duration in weeks: 6  Treatment plan discussed with: patient        Subjective Evaluation    History of Present Illness  Date of surgery: 2022  Mechanism of injury: Patient reports that she has been working hard on stretching and strengthening with her HEP  Feeling that she would like to progress back to using the gym and would like to talk to the doctor about possible pool therapy  Patient still having increased nerve pain throughout B LEs, started a medication for restless legs             Not a recurrent problem   Quality of life: good    Pain  Current pain ratin  At best pain ratin  At worst pain ratin  Location: L knee  Quality: sharp and dull ache  Aggravating factors: sitting, standing, walking, stair climbing and lifting  Progression: improved    Social Support  Steps to enter house: yes  Stairs in house: no   Lives in: [place]  Lives with: spouse    Employment status: not working    Diagnostic Tests  No diagnostic tests performed  Treatments  Previous treatment: medication  Patient Goals  Patient goals for therapy: decreased edema, increased strength, decreased pain, independence with ADLs/IADLs and return to sport/leisure activities  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 would like to get back to [employer]        Objective     Active Range of Motion   Left Hip   Normal active range of motion    Right Hip   Normal active range of motion  Left Knee   Flexion: 112 degrees with pain  Extension: -10 degrees with pain  Left Ankle/Foot   Normal active range of motion    Right Ankle/Foot   Normal active range of motion    Passive Range of Motion   Left Knee   Flexion: 115 degrees with pain  Extension: -5 degrees with pain    Strength/Myotome Testing     Left Hip   Planes of Motion   Flexion: 4  Extension: 4  Abduction: 4  Adduction: 4+    Right Hip   Normal muscle strength    Left Knee   Flexion: 4-  Extension: 4-    Right Knee   Flexion: 5  Extension: 5    Left Ankle/Foot   Normal strength    Right Ankle/Foot   Normal strength    Functional Assessment        Single Leg Stance   Left: 10 seconds  Right: 30 seconds  Neuro Exam:     Functional outcomes   5x sit to stand: 12, no arms (seconds)  TU sec SPC, 13 sec no assisted device (seconds)  Functional outcome gait comment: Patient has progressed to ambulating with and without the [facility]  Patient ambulates with the L knee slightly flexed during stance phase, lacks full end range extension  Patient having more L hip flexor pain today, has been stretching at home as well as in PT  Patient's stride length is slightly unequal as L knee ROM is limited     Patient uses the cane mostly for outdoor and community ambulation; no assisted device needed in the home                 Precautions: COPD, Anxiety, Depression, PTSD, FALL RISK    L TKA  Manuals 1/20 1/23 1/9 1/12 1/17   Knee PROM flex/ext to tolerance X 10'    x10'  x10m x5' x10m   Patellar mobs  5' x5'                                   Neuro Re-Ed                           SLS   5x:10         sidestepping    2# 2x    NV with weights     Tandem walking    2# 2x                                                   Ther Ex              Nu step  x10' L1 Upright bike x 10'  10 min L1 x10' L1  10min   QS long sitting  10x:05   20 x5"  10x:05  [id number]   Ankle pumps  dc  30 x  -     Heel slides  Seated 2x10  30 x3"  -    Sup hip abd  NT      2x10   SAQ 3#2x10 2x10  3x10  -  AAx10 +pain   LAQ 3# 2x10 2x10  3x10  1 5# 2x10  1 5# 2x10                           Step ups 6 inx20 6" 20x   4in fwd & lat x15   Cybex ham curls #15 2x10       Leg press 85 # 2x10 B, 35# L 2x10   - B # 30 2x10 -    Cybex hip abd 35# 2x10 - # 25# 2x10  -    SLR x 3, start with L   2# B 2x10  2# 2x10 ea  1 5# B 2x10  1 5# 2x10  1 5# 2x10   Ham curls, start with L  2# B 2x10  2# 2x10 ea  1 5# B 2x10  1 5# 2x10  1 5# 2x10   HR, B, standing  2x10  2x10  x20  x20  x20   Mini squats 2x10 2x10 x20 x20 x20    Supine Knee extension stretch   -          Rises from chair  1 pad 2x10 1 pad 2x10 1 pad 2x10  1 pad 2x10  1# 2x10    supine ham stretch     80ncky1  -  [id number]                                                                                                                                 Ther Activity                                         Gait Training             Gait training with RW                           Modalities             CP PRN   NT time constraints  x5 min                     Progress as able

## 2023-01-24 LAB
HPV HR 12 DNA CVX QL NAA+PROBE: NOT DETECTED
HPV16 DNA SPEC QL NAA+PROBE: NOT DETECTED
HPV18 DNA SPEC QL NAA+PROBE: NOT DETECTED
THIN PREP CVX: NORMAL

## 2023-01-25 ENCOUNTER — OFFICE VISIT (OUTPATIENT)
Dept: OBGYN CLINIC | Facility: HOSPITAL | Age: 58
End: 2023-01-25

## 2023-01-25 VITALS
WEIGHT: 123 LBS | HEART RATE: 70 BPM | SYSTOLIC BLOOD PRESSURE: 135 MMHG | HEIGHT: 63 IN | DIASTOLIC BLOOD PRESSURE: 76 MMHG | BODY MASS INDEX: 21.79 KG/M2

## 2023-01-25 DIAGNOSIS — Z96.659 STATUS POST TOTAL KNEE REPLACEMENT, UNSPECIFIED LATERALITY: Primary | ICD-10-CM

## 2023-01-25 NOTE — LETTER
January 25, 2023     Patient: Santi Oneill  YOB: 1965  Date of Visit: 1/25/2023      To Whom it May Concern:    Babar Abdul is under my professional care  Pinky Juan was seen in my office on 1/25/2023  Pinky Juan may return to the gym  If you have any questions or concerns, please don't hesitate to call           Sincerely,          Tata Benito MD

## 2023-01-26 ENCOUNTER — OFFICE VISIT (OUTPATIENT)
Dept: PHYSICAL THERAPY | Age: 58
End: 2023-01-26

## 2023-01-26 DIAGNOSIS — M17.12 PRIMARY OSTEOARTHRITIS OF LEFT KNEE: Primary | ICD-10-CM

## 2023-01-26 DIAGNOSIS — G89.29 CHRONIC PAIN OF LEFT KNEE: ICD-10-CM

## 2023-01-26 DIAGNOSIS — M25.562 CHRONIC PAIN OF LEFT KNEE: ICD-10-CM

## 2023-01-26 NOTE — PROGRESS NOTES
Daily Note     Today's date: 2023  Patient name: Sumeet Whitman  : 1965  MRN: [id number]  Referring provider: Jania Daniels  Dx:   Encounter Diagnosis     ICD-10-CM    1  Primary osteoarthritis of left knee  M17 12       2  Chronic pain of left knee  M25 562     G89 29                      Subjective: Patient reports that she followed up with Dr Roberta May and was cleared for aqua therapy  Objective: See treatment diary below      Assessment: Tolerated treatment well  Patient demonstrated fatigue post treatment, exhibited good technique with therapeutic exercises and would benefit from continued PT Patient's extension is 1-2 degrees improved since last session, patient is tight in hamstrings, causing her to flex the hamstrings with overpressure  Patient has been stretching and exercising at home  Patient also planning to return to the gym  Plan: Continue per plan of care  Patient's appts will be switched to  Aqua therapy           Precautions: Precautions: COPD, Anxiety, Depression, PTSD, FALL RISK  POOL    Manuals                        Neuro Re-Ed                        Ther Ex                Laps pre/post                Seated hamstring stretch, strap        Piriformis stretch                LAQ        SLR x 3        Ham curls        HR                Knee flex/ext stretch on step        sidestepping        Tandem walking                Pool pony cycling                Step ups FW, lateral         squats                Paddles:        Rows and extension        abd/add        Horizontal abd/add        Bicep curls                                                                                        Ther Activity                        Gait Training                        Modalities                        Progress as able    Precautions: COPD, Anxiety, Depression, PTSD, FALL RISK  LAND    Manuals    Knee PROM flex/ext to tolerance X 10'    x10'  x10m x5' x10m   Patellar mobs  5' x5'                                   Neuro Re-Ed                           SLS   5x:10  5x:10        sidestepping    2# 2x  2# 2x  NV with weights     Tandem walking    2# 2x  2# 2x                                                 Ther Ex              Nu step  x10' L1 Upright bike x 10'  10 min L1 x10' L1  10min           SLR flex        S/l hip abd        SAQ 3#2x10 2x10  3# 3x10  -  AAx10 +pain   LAQ 3# 2x10 2x10  3# 3x10  1 5# 2x10  1 5# 2x10                           Step ups 6 inx20 6" 20x 8" 20x FW/lat  4in fwd & lat x15   Cybex ham curls #15 2x10  15# 2x10     Leg press 85 # 2x10 B, 35# L 2x10   - L 40#  x10  B 105# 20x -    Cybex hip abd 35# 2x10 - 35# 2x10  -            SLR x 3, start with L   2# B 2x10  2# 2x10 ea  2# B 2x10  1 5# 2x10  1 5# 2x10   Ham curls, start with L  2# B 2x10  2# 2x10 ea  2# B 2x10  1 5# 2x10  1 5# 2x10   HR, B, standing  2x10  2x10  x20  x20  x20   Mini squats 2x10 2x10 x20 x20 x20    Supine Knee extension stretch   -          Rises from chair  1 pad 2x10 1 pad 2x10 1 pad 2x10  1 pad 2x10  1# 2x10    supine ham stretch     [id number]  -  61sgco3                                                                                                                                 Ther Activity                                         Gait Training             Gait training with RW                           Modalities             CP PRN   NT time constraints  x5 min                     Progress as able

## 2023-01-31 ENCOUNTER — APPOINTMENT (OUTPATIENT)
Dept: PHYSICAL THERAPY | Age: 58
End: 2023-01-31

## 2023-01-31 ENCOUNTER — OFFICE VISIT (OUTPATIENT)
Dept: PHYSICAL THERAPY | Age: 58
End: 2023-01-31

## 2023-01-31 DIAGNOSIS — M17.12 PRIMARY OSTEOARTHRITIS OF LEFT KNEE: Primary | ICD-10-CM

## 2023-01-31 DIAGNOSIS — G89.29 CHRONIC PAIN OF LEFT KNEE: ICD-10-CM

## 2023-01-31 DIAGNOSIS — M25.562 CHRONIC PAIN OF LEFT KNEE: ICD-10-CM

## 2023-01-31 NOTE — PROGRESS NOTES
Daily Note     Today's date: 2023  Patient name: Sumeet Whitman  : 1965  MRN: [id number]  Referring provider: Jania Daniels  Dx:   Encounter Diagnosis     ICD-10-CM    1  Primary osteoarthritis of left knee  M17 12       2  Chronic pain of left knee  M25 562     G89 29                      Subjective: Patient reports that she walked for full amount with shopping and noted some L knee soreness with hard surface and time on her feet  Pt ready for pool ex as she has done it before and had good results      Objective: See treatment diary below      Assessment: Tolerated treatment well  Patient demonstrated fatigue post treatment, exhibited good technique with therapeutic exercises and would benefit from continued PT ,  Initiated pool therapy with focus being L LE stretches and light resistive ex for L LE  Pt reports fatigue and needed an occasional rest period due to soreness and fatigue with L LE  Will adjust ex based on response from 1st pool session      Plan: Continue per plan of care            Precautions: Precautions: COPD, Anxiety, Depression, PTSD, FALL RISK  POOL    Manuals         [employer] weights               Neuro Re-Ed                        Ther Ex                Laps pre/post                Seated hamstring stretch, strap [id number]       Piriformis stretch [id number]       Hip flexor str on step [id number]       LAQ        SLR x 3 x20       Ham curls x20       HR x20       squats        Knee flex/ext stretch on step knee flexion stretch at step [id number]       sidestepping x5       Tandem walking        bwd walkks x5       Pool pony cycling x5 min               Step ups FW, lateral  x20        x20               Paddles:        Rows and extension x20       abd/add x20       Horizontal abd/add x20       Bicep curls x20                                                                                       Ther Activity                        Gait Training                        Modalities Progress as able    Precautions: COPD, Anxiety, Depression, PTSD, FALL RISK  LAND    Manuals 1/20 1/23 1/26 1/12 1/17   Knee PROM flex/ext to tolerance X 10'    x10'  x10m x5' x10m   Patellar mobs  5' x5'                                   Neuro Re-Ed                           SLS   5x:10  5x:10        sidestepping    2# 2x  2# 2x  NV with weights     Tandem walking    2# 2x  2# 2x                                                 Ther Ex              Nu step  x10' L1 Upright bike x 10'  10 min L1 x10' L1  10min           SLR flex        S/l hip abd        SAQ 3#2x10 2x10  3# 3x10  -  AAx10 +pain   LAQ 3# 2x10 2x10  3# 3x10  1 5# 2x10  1 5# 2x10                           Step ups 6 inx20 6" 20x 8" 20x FW/lat  4in fwd & lat x15   Cybex ham curls #15 2x10  15# 2x10     Leg press 85 # 2x10 B, 35# L 2x10   - L 40#  x10  B 105# 20x -    Cybex hip abd 35# 2x10 - 35# 2x10  -            SLR x 3, start with L   2# B 2x10  2# 2x10 ea  2# B 2x10  1 5# 2x10  1 5# 2x10   Ham curls, start with L  2# B 2x10  2# 2x10 ea  2# B 2x10  1 5# 2x10  1 5# 2x10   HR, B, standing  2x10  2x10  x20  x20  x20   Mini squats 2x10 2x10 x20 x20 x20    Supine Knee extension stretch   -          Rises from chair  1 pad 2x10 1 pad 2x10 1 pad 2x10  1 pad 2x10  1# 2x10    supine ham stretch     13cdrz2  -  14vbtm3                                                                                                                                 Ther Activity                                         Gait Training             Gait training with RW                           Modalities             CP PRN   NT time constraints  x5 min                     Progress as able

## 2023-02-03 ENCOUNTER — APPOINTMENT (OUTPATIENT)
Dept: PHYSICAL THERAPY | Age: 58
End: 2023-02-03

## 2023-02-03 ENCOUNTER — OFFICE VISIT (OUTPATIENT)
Dept: PHYSICAL THERAPY | Age: 58
End: 2023-02-03

## 2023-02-03 ENCOUNTER — APPOINTMENT (OUTPATIENT)
Dept: LAB | Age: 58
End: 2023-02-03

## 2023-02-03 DIAGNOSIS — K58.0 IRRITABLE BOWEL SYNDROME WITH DIARRHEA: ICD-10-CM

## 2023-02-03 DIAGNOSIS — G89.29 CHRONIC PAIN OF LEFT KNEE: ICD-10-CM

## 2023-02-03 DIAGNOSIS — Z76.89 ENCOUNTER TO ESTABLISH CARE: ICD-10-CM

## 2023-02-03 DIAGNOSIS — M25.562 CHRONIC PAIN OF LEFT KNEE: ICD-10-CM

## 2023-02-03 DIAGNOSIS — M17.12 PRIMARY OSTEOARTHRITIS OF LEFT KNEE: ICD-10-CM

## 2023-02-03 DIAGNOSIS — M17.12 PRIMARY OSTEOARTHRITIS OF LEFT KNEE: Primary | ICD-10-CM

## 2023-02-03 DIAGNOSIS — F41.9 ANXIETY DISORDER, UNSPECIFIED TYPE: ICD-10-CM

## 2023-02-03 DIAGNOSIS — G25.81 RESTLESS LEG SYNDROME: ICD-10-CM

## 2023-02-03 LAB
25(OH)D3 SERPL-MCNC: 31.4 NG/ML (ref 30–100)
ALBUMIN SERPL BCP-MCNC: 3.9 G/DL (ref 3.5–5)
ALP SERPL-CCNC: 72 U/L (ref 46–116)
ALT SERPL W P-5'-P-CCNC: 28 U/L (ref 12–78)
ANION GAP SERPL CALCULATED.3IONS-SCNC: -1 MMOL/L (ref 4–13)
AST SERPL W P-5'-P-CCNC: 17 U/L (ref 5–45)
BILIRUB SERPL-MCNC: 0.37 MG/DL (ref 0.2–1)
BUN SERPL-MCNC: 22 MG/DL (ref 5–25)
CALCIUM SERPL-MCNC: 9.6 MG/DL (ref 8.3–10.1)
CHLORIDE SERPL-SCNC: 107 MMOL/L (ref 96–108)
CO2 SERPL-SCNC: 28 MMOL/L (ref 21–32)
CREAT SERPL-MCNC: 0.77 MG/DL (ref 0.6–1.3)
ERYTHROCYTE [DISTWIDTH] IN BLOOD BY AUTOMATED COUNT: 13.5 % (ref 11.6–15.1)
FERRITIN SERPL-MCNC: 89 NG/ML (ref 8–388)
GFR SERPL CREATININE-BSD FRML MDRD: 85 ML/MIN/1.73SQ M
GLUCOSE P FAST SERPL-MCNC: 86 MG/DL (ref 65–99)
HCT VFR BLD AUTO: 43.1 % (ref 34.8–46.1)
HGB BLD-MCNC: 14.1 G/DL (ref 11.5–15.4)
IRON SATN MFR SERPL: 43 % (ref 15–50)
IRON SERPL-MCNC: 124 UG/DL (ref 50–170)
MAGNESIUM SERPL-MCNC: 2.2 MG/DL (ref 1.6–2.6)
MCH RBC QN AUTO: 31.3 PG (ref 26.8–34.3)
MCHC RBC AUTO-ENTMCNC: 32.7 G/DL (ref 31.4–37.4)
MCV RBC AUTO: 96 FL (ref 82–98)
PLATELET # BLD AUTO: 233 THOUSANDS/UL (ref 149–390)
PMV BLD AUTO: 10.7 FL (ref 8.9–12.7)
POTASSIUM SERPL-SCNC: 4.5 MMOL/L (ref 3.5–5.3)
PROT SERPL-MCNC: 7.2 G/DL (ref 6.4–8.4)
RBC # BLD AUTO: 4.51 MILLION/UL (ref 3.81–5.12)
SODIUM SERPL-SCNC: 134 MMOL/L (ref 135–147)
T4 FREE SERPL-MCNC: 0.87 NG/DL (ref 0.76–1.46)
TIBC SERPL-MCNC: 291 UG/DL (ref 250–450)
TSH SERPL DL<=0.05 MIU/L-ACNC: 1.02 UIU/ML (ref 0.45–4.5)
WBC # BLD AUTO: 5.61 THOUSAND/UL (ref 4.31–10.16)

## 2023-02-03 NOTE — PROGRESS NOTES
Daily Note     Today's date: 2/3/2023  Patient name: Maury Weems  : 1965  MRN: [id number]  Referring provider: Maryellen Floyd  Dx:   Encounter Diagnosis     ICD-10-CM    1  Primary osteoarthritis of left knee  M17 12       2  Chronic pain of left knee  M25 562     G89 29                      Subjective: Patient reports that she is still is getting swelling and pain in L knee when she stands too long at home  Pt had pain with standing with cooking yesterday      Objective: See treatment diary below      Assessment: Tolerated treatment well  Patient demonstrated fatigue post treatment, exhibited good technique with therapeutic exercises and would benefit from continued PT  Focus in session wasL LE stretches and light resistive ex for L LE  Pt reports fatigue and needed an occasional rest period due to soreness and fatigue with L LE  Able to add more aerobic element to program to work on pt stamina  Plan: Continue per plan of care            Precautions: Precautions: COPD, Anxiety, Depression, PTSD, FALL RISK  POOL    Manuals  2/3       [employer] weights [employer]              Neuro Re-Ed                        Ther Ex                Laps pre/post                Seated hamstring stretch, strap [id number]      Piriformis stretch [id number]      Hip flexor str on step [id number]      LAQ        SLR x 3 x20 x20      Ham curls x20 x20      HR x20 x20      squats        Knee flex/ext stretch on step knee flexion stretch at step [id number] knee flexion stretch at step [id number]      sidestepping x5 x5      Tandem walking        bwd walkks x5 x5      Pool pony cycling x5 min x5min              Step ups FW, lateral  x20 x20      step downs x20 x20              Paddles:        Rows and extension x20 x20      abd/add x20 x20      Horizontal abd/add x20 x20      Bicep curls x20 x20      Half jacks  3 min      Cross country ski  x3 min Ther Activity                        Gait Training                        Modalities                        Progress as able    Precautions: COPD, Anxiety, Depression, PTSD, FALL RISK  LAND    Manuals 1/20 1/23 1/26 1/12 1/17   Knee PROM flex/ext to tolerance X 10'    x10'  x10m x5' x10m   Patellar mobs  5' x5'                                   Neuro Re-Ed                           SLS   5x:10  5x:10        sidestepping    2# 2x  2# 2x  NV with weights     Tandem walking    2# 2x  2# 2x                                                 Ther Ex              Nu step  x10' L1 Upright bike x 10'  10 min L1 x10' L1  10min           SLR flex        S/l hip abd        SAQ 3#2x10 2x10  3# 3x10  -  AAx10 +pain   LAQ 3# 2x10 2x10  3# 3x10  1 5# 2x10  1 5# 2x10                           Step ups 6 inx20 6" 20x 8" 20x FW/lat  4in fwd & lat x15   Cybex ham curls #15 2x10  15# 2x10     Leg press 85 # 2x10 B, 35# L 2x10   - L 40#  x10  B 105# 20x -    Cybex hip abd 35# 2x10 - 35# 2x10  -            SLR x 3, start with L   2# B 2x10  2# 2x10 ea  2# B 2x10  1 5# 2x10  1 5# 2x10   Ham curls, start with L  2# B 2x10  2# 2x10 ea  2# B 2x10  1 5# 2x10  1 5# 2x10   HR, B, standing  2x10  2x10  x20  x20  x20   Mini squats 2x10 2x10 x20 x20 x20    Supine Knee extension stretch   -          Rises from chair  1 pad 2x10 1 pad 2x10 1 pad 2x10  1 pad 2x10  1# 2x10    supine ham stretch     [id number]  -  [id number]                                                                                                                                 Ther Activity                                         Gait Training             Gait training with RW                           Modalities             CP PRN   NT time constraints  x5 min                     Progress as able

## 2023-02-06 ENCOUNTER — OFFICE VISIT (OUTPATIENT)
Dept: PHYSICAL THERAPY | Age: 58
End: 2023-02-06

## 2023-02-06 DIAGNOSIS — M25.562 CHRONIC PAIN OF LEFT KNEE: ICD-10-CM

## 2023-02-06 DIAGNOSIS — M17.12 PRIMARY OSTEOARTHRITIS OF LEFT KNEE: Primary | ICD-10-CM

## 2023-02-06 DIAGNOSIS — G89.29 CHRONIC PAIN OF LEFT KNEE: ICD-10-CM

## 2023-02-06 NOTE — PROGRESS NOTES
Daily Note     Today's date: 2023  Patient name: Santi Oneill  : 1965  MRN: [id number]  Referring provider: Candie Phelps  Dx:   Encounter Diagnosis     ICD-10-CM    1  Primary osteoarthritis of left knee  M17 12       2  Chronic pain of left knee  M25 562     G89 29                      Subjective: Patient reports that she has been doing well at home, has been exercising at the gym  Patient feeling sore in her gastroc and ankle today  Patient did some balance exercises at the gym this past weekend  Objective: See treatment diary below      Assessment: Tolerated treatment well  Patient demonstrated fatigue post treatment, exhibited good technique with therapeutic exercises and would benefit from continued PT Patient tolerating progressions well with therapy today  Patient fatigued post exercising  Patient feeling moderately challenged  Patient does feel the warmth from the water helps with her knee pain and her ankle pain  Discussed gastroc pain, patient has been stretching at home, and has been working on her knee extension - knee hang over the edge of the bed  Patient still ~ 1-2 degrees flexed at knee, very close to full extension; limited from mild swelling  Plan: Continue per plan of care        Precautions: Precautions: COPD, Anxiety, Depression, PTSD, FALL RISK  POOL    Manuals 1/31 2/3 2/6      [employer] weights [employer] Red weights             Neuro Re-Ed                        Ther Ex                Laps pre/post                Seated hamstring stretch, strap [id number] 20yxmv4 5x:20      Piriformis stretch 77eqei4 78qydg7 5x:20     Hip flexor str on step 59rhhv3 61maft6 5x:20      LAQ  20x 20x     SLR x 3 x20 x20 20x     Ham curls x20 x20 20x     HR x20 x20 20x     squats   20x on 2nd step (from bottom)     Knee flex/ext stretch on step knee flexion stretch at step 28wqew4 knee flexion stretch at step 84hwlz1 5x:20 knee flexion stretch     sidestepping x5 x5 5x Tandem walking        bwd walkks x5 x5 5x     Pool pony cycling x5 min x5min X 5 min             Step ups FW, lateral  x20 x20 20x     step downs x20 x20 20x             Paddles:        Rows and extension x20 x20 20x     abd/add x20 x20 20x     Horizontal abd/add x20 x20 20x     Bicep curls x20 x20 20x     Half jacks  3 min x3 min      Cross country ski  x3 min x3 min                                                                     Ther Activity                        Gait Training                        Modalities                        Progress as able    Precautions: COPD, Anxiety, Depression, PTSD, FALL RISK  LAND    Manuals 1/20 1/23 1/26 1/12 1/17   Knee PROM flex/ext to tolerance X 10'    x10'  x10m x5' x10m   Patellar mobs  5' x5'                                   Neuro Re-Ed                           SLS   5x:10  5x:10        sidestepping    2# 2x  2# 2x  NV with weights     Tandem walking    2# 2x  2# 2x                                                 Ther Ex              Nu step  x10' L1 Upright bike x 10'  10 min L1 x10' L1  10min           SLR flex        S/l hip abd        SAQ 3#2x10 2x10  3# 3x10  -  AAx10 +pain   LAQ 3# 2x10 2x10  3# 3x10  1 5# 2x10  1 5# 2x10                           Step ups 6 inx20 6" 20x 8" 20x FW/lat  4in fwd & lat x15   Cybex ham curls #15 2x10  15# 2x10     Leg press 85 # 2x10 B, 35# L 2x10   - L 40#  x10  B 105# 20x -    Cybex hip abd 35# 2x10 - 35# 2x10  -            SLR x 3, start with L   2# B 2x10  2# 2x10 ea  2# B 2x10  1 5# 2x10  1 5# 2x10   Ham curls, start with L  2# B 2x10  2# 2x10 ea  2# B 2x10  1 5# 2x10  1 5# 2x10   HR, B, standing  2x10  2x10  x20  x20  x20   Mini squats 2x10 2x10 x20 x20 x20    Supine Knee extension stretch   -          Rises from chair  1 pad 2x10 1 pad 2x10 1 pad 2x10  1 pad 2x10  1# 2x10    supine ham stretch     [id number]  -  05mkri1                                                                                                                                 Ther Activity                                         Gait Training             Gait training with RW                           Modalities             CP PRN   NT time constraints  x5 min                     Progress as able

## 2023-02-09 ENCOUNTER — OFFICE VISIT (OUTPATIENT)
Dept: PHYSICAL THERAPY | Age: 58
End: 2023-02-09

## 2023-02-09 DIAGNOSIS — M25.562 CHRONIC PAIN OF LEFT KNEE: ICD-10-CM

## 2023-02-09 DIAGNOSIS — G89.29 CHRONIC PAIN OF LEFT KNEE: ICD-10-CM

## 2023-02-09 DIAGNOSIS — M17.12 PRIMARY OSTEOARTHRITIS OF LEFT KNEE: Primary | ICD-10-CM

## 2023-02-09 NOTE — PROGRESS NOTES
Daily Note     Today's date: 2023  Patient name: Jose Nava  : 1965  MRN: [id number]  Referring provider: Roselia Best  Dx:   Encounter Diagnosis     ICD-10-CM    1  Primary osteoarthritis of left knee  M17 12       2  Chronic pain of left knee  M25 562     G89 29                      Subjective: Patient reports that she has been doing well at home, has been exercising at the gym  Still notes times of swelling and soreness in L knee based on activity increase      Objective: See treatment diary below      Assessment: Tolerated treatment well  Patient demonstrated fatigue post treatment, exhibited good technique with therapeutic exercises and would benefit from continued PT Patient tolerating progressions well with therapy today  Patient fatigued post exercising  Patient feeling moderately challenged  Patient does feel the warmth from the water helps with her knee pain and stiffness  Pt also feeing some aerobic benefit from water  Patient still ~ 1-2 degrees flexed at knee, very close to full extension; limited from mild swelling  Plan: Continue per plan of care        Precautions: Precautions: COPD, Anxiety, Depression, PTSD, FALL RISK  POOL    Manuals 1/31 2/3 2/6 2/9     [employer] weights [employer] Red weights Red weights            Neuro Re-Ed                        Ther Ex                Laps pre/post                Seated hamstring stretch, strap 39rclq7 81ndqf6 5x:20  5x20s    Piriformis stretch 86flrm5 30ibcn3 5x:20 5x 20s    Hip flexor str on step 51bdtj7 71skhb5 5x:20  5x 20s    LAQ  20x 20x x30    SLR x 3 x20 x20 20x x30    Ham curls x20 x20 20x x30    HR x20 x20 20x x30    squats   20x on 2nd step (from bottom) 20x on 2nd step (from bottom)    Knee flex/ext stretch on step knee flexion stretch at step 98tjwh7 knee flexion stretch at step 22zgjg8 5x:20 knee flexion stretch 5x:20 knee flexion stretch    sidestepping x5 x5 5x 5x    Tandem walking        bwd walks x5 x5 5x 5x Pool pony cycling x5 min x5min X 5 min X 5min            Step ups FW, lateral  x20 x20 20x 20x    step downs x20 x20 20x 20x            Paddles:        Rows and extension x20 x20 20x 20x    abd/add x20 x20 20x 20x    Horizontal abd/add x20 x20 20x 20x    Bicep curls x20 x20 20x 20x    Half jacks  3 min x3 min  x3min    Cross country ski  x3 min x3 min X 3min                                                                    Ther Activity                        Gait Training                        Modalities                        Progress as able    Precautions: COPD, Anxiety, Depression, PTSD, FALL RISK  LAND    Manuals 1/20 1/23 1/26 1/12 1/17   Knee PROM flex/ext to tolerance X 10'    x10'  x10m x5' x10m   Patellar mobs  5' x5'                                   Neuro Re-Ed                           SLS   5x:10  5x:10        sidestepping    2# 2x  2# 2x  NV with weights     Tandem walking    2# 2x  2# 2x                                                 Ther Ex              Nu step  x10' L1 Upright bike x 10'  10 min L1 x10' L1  10min           SLR flex        S/l hip abd        SAQ 3#2x10 2x10  3# 3x10  -  AAx10 +pain   LAQ 3# 2x10 2x10  3# 3x10  1 5# 2x10  1 5# 2x10                           Step ups 6 inx20 6" 20x 8" 20x FW/lat  4in fwd & lat x15   Cybex ham curls #15 2x10  15# 2x10     Leg press 85 # 2x10 B, 35# L 2x10   - L 40#  x10  B 105# 20x -    Cybex hip abd 35# 2x10 - 35# 2x10  -            SLR x 3, start with L   2# B 2x10  2# 2x10 ea  2# B 2x10  1 5# 2x10  1 5# 2x10   Ham curls, start with L  2# B 2x10  2# 2x10 ea  2# B 2x10  1 5# 2x10  1 5# 2x10   HR, B, standing  2x10  2x10  x20  x20  x20   Mini squats 2x10 2x10 x20 x20 x20    Supine Knee extension stretch   -          Rises from chair  1 pad 2x10 1 pad 2x10 1 pad 2x10  1 pad 2x10  1# 2x10    supine ham stretch     73sfup0  -  07yxvq7                                                                                                                                 Ther Activity                                         Gait Training             Gait training with RW                           Modalities             CP PRN   NT time constraints  x5 min                     Progress as able

## 2023-02-13 ENCOUNTER — OFFICE VISIT (OUTPATIENT)
Dept: PHYSICAL THERAPY | Age: 58
End: 2023-02-13

## 2023-02-13 DIAGNOSIS — G89.29 CHRONIC PAIN OF LEFT KNEE: ICD-10-CM

## 2023-02-13 DIAGNOSIS — M17.12 PRIMARY OSTEOARTHRITIS OF LEFT KNEE: Primary | ICD-10-CM

## 2023-02-13 DIAGNOSIS — M25.562 CHRONIC PAIN OF LEFT KNEE: ICD-10-CM

## 2023-02-13 NOTE — PROGRESS NOTES
Daily Note     Today's date: 2023  Patient name: Princess Kayser  : 1965  MRN: [id number]  Referring provider: Florina Hsu  Dx:   Encounter Diagnosis     ICD-10-CM    1  Primary osteoarthritis of left knee  M17 12       2  Chronic pain of left knee  M25 562     G89 29                      Subjective: Patient reports that she has been doing well at home, has been exercising at the gym  Still notes times of swelling and soreness in L knee based on activity increase  Pt notes she still has some strength defectis with descending steps      Objective: See treatment diary below      Assessment: Tolerated treatment well  Patient demonstrated fatigue post treatment, exhibited good technique with therapeutic exercises and would benefit from continued PT Patient tolerating progressions well with therapy today  Working on eccentric control on steps in water  Patient fatigued post exercising  Patient feeling moderately challenged  Patient does feel the warmth from the water helps with her knee pain and stiffness  Pt also feeing some aerobic benefit from water  Plan: Continue per plan of care        Precautions: Precautions: COPD, Anxiety, Depression, PTSD, FALL RISK  POOL    Manuals 1/31 2/3 2/6 2/9 2/13    [employer] weights [employer] Red weights Red weights Red weights           Neuro Re-Ed                        Ther Ex                Laps pre/post                Seated hamstring stretch, strap 77cdun1 97cvam4 5x:20  5x20s 5x20s   Piriformis stretch 17hevt0 27kjhk3 5x:20 5x 20s 5x20s   Hip flexor str on step 10bqqd9 18amxe1 5x:20  5x 20s 5x20s   LAQ  20x 20x x30 x30   SLR x 3 x20 x20 20x x30 x30   Ham curls x20 x20 20x x30 x30   HR x20 x20 20x x30 x30   squats   20x on 2nd step (from bottom) 20x on 2nd step (from bottom) 20x on 2nd step (from bottom)   Knee flex/ext stretch on step knee flexion stretch at step 97ompx5 knee flexion stretch at step 34jhzj5 5x:20 knee flexion stretch 5x:20 knee flexion stretch 5x:20 knee flexion stretch   sidestepping x5 x5 5x 5x 5x   Tandem walking        bwd walks x5 x5 5x 5x 5x   Pool pony cycling x5 min x5min X 5 min X 5min x5min           Step ups FW, lateral  x20 x20 20x 20x 20x   step downs x20 x20 20x 20x 20x           Paddles:        Rows and extension x20 x20 20x 20x 20x   abd/add x20 x20 20x 20x 20x   Horizontal abd/add x20 x20 20x 20x 20x   Bicep curls x20 x20 20x 20x 20x   Half jacks- large DB  3 min x3 min  x3min x3 min   Cross country ski- Large DB  x3 min x3 min X 3min x3 min                                                                   Ther Activity                        Gait Training                        Modalities                        Progress as able    Precautions: COPD, Anxiety, Depression, PTSD, FALL RISK  LAND    Manuals 1/20 1/23 1/26 1/12 1/17   Knee PROM flex/ext to tolerance X 10'    x10'  x10m x5' x10m   Patellar mobs  5' x5'                                   Neuro Re-Ed                           SLS   5x:10  5x:10        sidestepping    2# 2x  2# 2x  NV with weights     Tandem walking    2# 2x  2# 2x                                                 Ther Ex              Nu step  x10' L1 Upright bike x 10'  10 min L1 x10' L1  10min           SLR flex        S/l hip abd        SAQ 3#2x10 2x10  3# 3x10  -  AAx10 +pain   LAQ 3# 2x10 2x10  3# 3x10  1 5# 2x10  1 5# 2x10                           Step ups 6 inx20 6" 20x 8" 20x FW/lat  4in fwd & lat x15   Cybex ham curls #15 2x10  15# 2x10     Leg press 85 # 2x10 B, 35# L 2x10   - L 40#  x10  B 105# 20x -    Cybex hip abd 35# 2x10 - 35# 2x10  -            SLR x 3, start with L   2# B 2x10  2# 2x10 ea  2# B 2x10  1 5# 2x10  1 5# 2x10   Ham curls, start with L  2# B 2x10  2# 2x10 ea  2# B 2x10  1 5# 2x10  1 5# 2x10   HR, B, standing  2x10  2x10  x20  x20  x20   Mini squats 2x10 2x10 x20 x20 x20    Supine Knee extension stretch   -          Rises from chair  1 pad 2x10 1 pad 2x10 1 pad 2x10  1 pad 2x10  1# 2x10    supine ham stretch     92jzwx5  -  51qkoe4                                                                                                                                 Ther Activity                                         Gait Training             Gait training with RW                           Modalities             CP PRN   NT time constraints  x5 min                     Progress as able

## 2023-02-16 ENCOUNTER — OFFICE VISIT (OUTPATIENT)
Dept: PHYSICAL THERAPY | Age: 58
End: 2023-02-16

## 2023-02-16 DIAGNOSIS — M17.12 PRIMARY OSTEOARTHRITIS OF LEFT KNEE: Primary | ICD-10-CM

## 2023-02-16 DIAGNOSIS — M25.562 CHRONIC PAIN OF LEFT KNEE: ICD-10-CM

## 2023-02-16 DIAGNOSIS — G89.29 CHRONIC PAIN OF LEFT KNEE: ICD-10-CM

## 2023-02-16 NOTE — PROGRESS NOTES
Daily Note     Today's date: 2023  Patient name: Margaret Bruno  : 1965  MRN: [id number]  Referring provider: Rhianna Blackman  Dx:   Encounter Diagnosis     ICD-10-CM    1  Primary osteoarthritis of left knee  M17 12       2  Chronic pain of left knee  M25 562     G89 29                      Subjective: Patient reports that she has been doing well at home, has been exercising at the gym  Still notes times of swelling and soreness in L knee based on activity increase  Pt notes she is trying to some walking on treadmill but monitoring her L knee pain      Objective: See treatment diary below      Assessment: Tolerated treatment well  Patient demonstrated fatigue post treatment, exhibited good technique with therapeutic exercises and would benefit from continued PT   Working on eccentric control on steps in water  Patient fatigued post exercising  Patient feeling moderately challenged  Patient does feel the warmth from the water helps with her knee pain and stiffness  Pt ill benefit from PT in pool for strengthening along with aerobic conditioning        Plan: Continue per plan of care        Precautions: Precautions: COPD, Anxiety, Depression, PTSD, FALL RISK  POOL    Manuals 2/16 2/3 2/6 2/9 2/13    [employer] weights [employer] Red weights Red weights Red weights           Neuro Re-Ed                        Ther Ex                Laps pre/post                Seated hamstring stretch, strap [id number] 64lfcg8 5x:20  5x20s 5x20s   Piriformis stretch 29nnmw4 88sidx1 5x:20 5x 20s 5x20s   Hip flexor str on step 32vehw3 49lfyy6 5x:20  5x 20s 5x20s   LAQ x30 20x 20x x30 x30   SLR x 3 x30 x20 20x x30 x30   Ham curls x30 x20 20x x30 x30   HR x30 x20 20x x30 x30   squats 20x on 2nd step (from bottom)  20x on 2nd step (from bottom) 20x on 2nd step (from bottom) 20x on 2nd step (from bottom)   Knee flex/ext stretch on step knee flexion stretch at step 47hlzh8 knee flexion stretch at step 20stri5 5x:20 knee flexion stretch 5x:20 knee flexion stretch 5x:20 knee flexion stretch   sidestepping x5 x5 5x 5x 5x   Tandem walking        bwd walks x5 x5 5x 5x 5x   Pool pony cycling x5 min x5min X 5 min X 5min x5min           Step ups FW, lateral  x20 x20 20x 20x 20x   step downs x20 x20 20x 20x 20x           Paddles:        Rows and extension x30 x20 20x 20x 20x   abd/add x30 x20 20x 20x 20x   Horizontal abd/add x30 x20 20x 20x 20x   Bicep curls x30 x20 20x 20x 20x   Half jacks- large DB x3 min 3 min x3 min  x3min x3 min   Cross country ski- Large DB X 3min x3 min x3 min X 3min x3 min                                                                   Ther Activity                        Gait Training                        Modalities                        Progress as able    Precautions: COPD, Anxiety, Depression, PTSD, FALL RISK  LAND    Manuals 1/20 1/23 1/26 1/12 1/17   Knee PROM flex/ext to tolerance X 10'    x10'  x10m x5' x10m   Patellar mobs  5' x5'                                   Neuro Re-Ed                           SLS   5x:10  5x:10        sidestepping    2# 2x  2# 2x  NV with weights     Tandem walking    2# 2x  2# 2x                                                 Ther Ex              Nu step  x10' L1 Upright bike x 10'  10 min L1 x10' L1  10min           SLR flex        S/l hip abd        SAQ 3#2x10 2x10  3# 3x10  -  AAx10 +pain   LAQ 3# 2x10 2x10  3# 3x10  1 5# 2x10  1 5# 2x10                           Step ups 6 inx20 6" 20x 8" 20x FW/lat  4in fwd & lat x15   Cybex ham curls #15 2x10  15# 2x10     Leg press 85 # 2x10 B, 35# L 2x10   - L 40#  x10  B 105# 20x -    Cybex hip abd 35# 2x10 - 35# 2x10  -            SLR x 3, start with L   2# B 2x10  2# 2x10 ea  2# B 2x10  1 5# 2x10  1 5# 2x10   Ham curls, start with L  2# B 2x10  2# 2x10 ea  2# B 2x10  1 5# 2x10  1 5# 2x10   HR, B, standing  2x10  2x10  x20  x20  x20   Mini squats 2x10 2x10 x20 x20 x20    Supine Knee extension stretch   -          Rises from chair  1 pad 2x10 1 pad 2x10 1 pad 2x10  1 pad 2x10  1# 2x10    supine ham stretch     96aswz9  -  51kfmb7                                                                                                                                 Ther Activity                                         Gait Training             Gait training with RW                           Modalities             CP PRN   NT time constraints  x5 min                     Progress as able

## 2023-02-20 ENCOUNTER — OFFICE VISIT (OUTPATIENT)
Dept: INTERNAL MEDICINE CLINIC | Facility: CLINIC | Age: 58
End: 2023-02-20

## 2023-02-20 ENCOUNTER — OFFICE VISIT (OUTPATIENT)
Dept: PHYSICAL THERAPY | Age: 58
End: 2023-02-20

## 2023-02-20 VITALS
HEART RATE: 71 BPM | HEIGHT: 63 IN | SYSTOLIC BLOOD PRESSURE: 126 MMHG | OXYGEN SATURATION: 99 % | BODY MASS INDEX: 22.25 KG/M2 | DIASTOLIC BLOOD PRESSURE: 68 MMHG | WEIGHT: 125.6 LBS

## 2023-02-20 DIAGNOSIS — M17.12 PRIMARY OSTEOARTHRITIS OF LEFT KNEE: ICD-10-CM

## 2023-02-20 DIAGNOSIS — M25.562 CHRONIC PAIN OF LEFT KNEE: ICD-10-CM

## 2023-02-20 DIAGNOSIS — F17.210 SMOKING GREATER THAN 20 PACK YEARS: ICD-10-CM

## 2023-02-20 DIAGNOSIS — Z23 ENCOUNTER FOR IMMUNIZATION: Primary | ICD-10-CM

## 2023-02-20 DIAGNOSIS — F32.A DEPRESSION, UNSPECIFIED DEPRESSION TYPE: ICD-10-CM

## 2023-02-20 DIAGNOSIS — E55.9 VITAMIN D DEFICIENCY: ICD-10-CM

## 2023-02-20 DIAGNOSIS — G89.29 CHRONIC PAIN OF LEFT KNEE: ICD-10-CM

## 2023-02-20 DIAGNOSIS — M17.12 PRIMARY OSTEOARTHRITIS OF LEFT KNEE: Primary | ICD-10-CM

## 2023-02-20 DIAGNOSIS — E87.1 HYPONATREMIA: ICD-10-CM

## 2023-02-20 DIAGNOSIS — G25.81 RESTLESS LEG SYNDROME: ICD-10-CM

## 2023-02-20 RX ORDER — ERGOCALCIFEROL 1.25 MG/1
50000 CAPSULE ORAL WEEKLY
Qty: 8 CAPSULE | Refills: 0 | Status: SHIPPED | OUTPATIENT
Start: 2023-02-20 | End: 2023-02-28

## 2023-02-20 RX ORDER — ACETAMINOPHEN 500 MG
1000 TABLET ORAL
Qty: 20 TABLET | Refills: 0
Start: 2023-02-20

## 2023-02-20 NOTE — PROGRESS NOTES
INTERNAL MEDICINE FOLLOW-UP OFFICE VISIT  [facility]    NAME: Rian Meigs  AGE: 62 y o  SEX: female  : 1965     DATE: 2023     Assessment and Plan:     Primary osteoarthritis of left knee  S/P left knee replacement on 22  Healing well  PT BID     Plan:  Increase Celebrex dose to 200 mg p o  every morning  Tylenol 1000 mg p o  nightly  Take Lyrica 50 mg p o  nightly  Continue PT twice weekly home exercises  Follow-up in 2 months  Depression  Patient has a longstanding history of depression since childhood  PHQ-9 score of 11  Not currently on any medication  Admits to intermittent suicidal ideation thoughts without any concrete plans  No current thoughts in the past 2 weeks  Patient follows up with a therapist twice a month      Plan:  Consider starting antidepressant medication  Patient is agreeable symptoms do not improve with the increase in pregabalin states that her knee pain RLS exacerbate her depression  Courage to call 911 or go to the nearest emergency room if suicidal ideations and plans worsen  Smoking greater than 20 pack years  1/2 ppd  X 40 years current smoker  Used to smoke  ppd for over 20 years  CT lung cancer screening was 23 was negative      Plan:  CT low dose Lung cancer screening   [employer] Smoking cessation encouraged  Patient declined setting a quit date  But stated that she is cutting back slowly  Restless leg syndrome  S/S for 3 years  Usually starts around 6 pm and prevents going to sleep if awakened at night to go to the bathroom  Restless in bed      Plan:  Increase Lyrica 25 mg 2 50 mg p o  qhs  Follow-up in 2 months  Return in about 8 weeks (around 2023) for Next scheduled follow up       Chief Complaint:     Chief Complaint   Patient presents with   • Follow-up     6 weeks        History of Present Illness:     Presented for follow-up on her restless leg syndrome and left knee pain since a month ago  She states that she still has moderate to severe knee pain after she has been very active on her feet all day  Symptoms were exacerbated last week after she went dancing for a few hours and had considerable amount of knee pain for about 2 days  She is compliant with her medication taking her Celebrex twice a day Tylenol as needed  States that her restless leg syndrome has improved with the Lyrica has residual symptoms about twice a week only  She denies fever, chills, HA, CP, SOB,  palpitations, Abd pain, n/v/d  Followed up for lung cancer screening and CT scan was negative  Also is up-to-date with her GYN screening which was also negative  States that she will follow-up with pulmonologist in 3 weeks for COPD and reactive airway disease  Attributes her depression to the knee pain and the restless leg syndrome otherwise is stable and continuing therapy  Patient did note a mild hyponatremia that is on her latest CMP and admits to restricting her's salt intake  The following portions of the patient's history were reviewed and updated as appropriate: allergies, current medications, past family history, past medical history, past social history, past surgical history and problem list      Review of Systems:     Review of Systems   Constitutional: Negative for activity change, appetite change, chills, fatigue, fever and unexpected weight change  HENT: Negative for congestion  Eyes: Negative for visual disturbance  Respiratory: Negative for shortness of breath  Cardiovascular: Negative for chest pain and palpitations  Gastrointestinal: Negative for abdominal pain, constipation, diarrhea, nausea and vomiting  Genitourinary: Negative for difficulty urinating  Musculoskeletal: Positive for arthralgias  Negative for joint swelling, myalgias and neck pain  Skin: Negative for rash  Neurological: Negative for dizziness, weakness, numbness and headaches     Hematological: Does not bruise/bleed easily  Psychiatric/Behavioral: Positive for sleep disturbance (RLS)  Negative for agitation, behavioral problems and confusion  Problem List:     Patient Active Problem List   Diagnosis   • Memory difficulties   • Anxiety disorder   • Depression   • Post traumatic stress disorder (PTSD)   • Subependymal grey matter heterotopia (HCC)   • Arachnoid cyst   • Cognitive complaints   • Cholesteatoma of ear   • Mixed conductive and sensorineural hearing loss of right ear with unrestricted hearing of left ear   • Cholesteatoma of attic of right ear   • Sprain of ankle, initial encounter   • Reactive airway disease   • PONV (postoperative nausea and vomiting)   • Irritable bowel syndrome with diarrhea   • Smoking greater than 20 pack years   • Primary osteoarthritis of left knee   • COPD (chronic obstructive pulmonary disease) ([facility] [address] )   • Restless leg syndrome   • Encounter for immunization   • Screening for cervical cancer        Objective:     /68 (BP Location: Left arm, Patient Position: Sitting, Cuff Size: Standard)   Pulse 71   Ht 5' 3" (1 6 m)   Wt 57 kg (125 lb 9 6 oz)   LMP 03/01/2019 (Within Weeks)   SpO2 99%   BMI 22 25 kg/m²     Physical Exam  Vitals and nursing note reviewed  Constitutional:       General: She is not in acute distress  Appearance: Normal appearance  She is not ill-appearing, toxic-appearing or diaphoretic  HENT:      Head: Normocephalic and atraumatic  Nose: Nose normal    Eyes:      General: No scleral icterus  Right eye: No discharge  Left eye: No discharge  Extraocular Movements: Extraocular movements intact  Pupils: Pupils are equal, round, and reactive to light  Neck:      Vascular: No carotid bruit  Cardiovascular:      Rate and Rhythm: Normal rate and regular rhythm  Pulses: Normal pulses  Heart sounds: Normal heart sounds  No murmur heard  No friction rub  No gallop     Pulmonary:      Effort: Pulmonary effort is normal       Breath sounds: Normal breath sounds  No wheezing, rhonchi or rales  Abdominal:      General: Bowel sounds are normal       Palpations: Abdomen is soft  Tenderness: There is no abdominal tenderness  There is no guarding or rebound  Musculoskeletal:         General: Tenderness (left knee without inflammation or erythema) present  No swelling  Cervical back: Normal range of motion and neck supple  No rigidity  No muscular tenderness  Right lower leg: No edema  Left lower leg: No edema  Skin:     Capillary Refill: Capillary refill takes less than 2 seconds  Coloration: Skin is not jaundiced  Findings: No bruising, erythema, lesion or rash  Neurological:      General: No focal deficit present  Mental Status: She is alert and oriented to person, place, and time  Motor: No weakness  Gait: Gait normal    Psychiatric:         Mood and Affect: Mood normal          Behavior: Behavior normal          Thought Content:  Thought content normal          Judgment: Judgment normal          Pertinent Laboratory/Diagnostic Studies:    Laboratory Results: I have personally reviewed the pertinent laboratory results/reports     CBC:   Results from Last 12 Months   Lab Units 02/03/23  0653 12/15/22  0542 11/11/22  0650   WBC Thousand/uL 5 61   < > 4 48   RBC Million/uL 4 51   < > 4 52   HEMOGLOBIN g/dL 14 1   < > 14 0   HEMATOCRIT % 43 1   < > 43 9   MCV fL 96   < > 97   MCH pg 31 3   < > 31 0   MCHC g/dL 32 7   < > 31 9   RDW % 13 5   < > 12 9   MPV fL 10 7   < > 11 1   PLATELETS Thousands/uL 233   < > 221   NRBC AUTO /100 WBCs  --   --  0   NEUTROS PCT %  --   --  60   LYMPHS PCT %  --   --  23   MONOS PCT %  --   --  13*   EOS PCT %  --   --  3   BASOS PCT %  --   --  1   NEUTROS ABS Thousands/µL  --   --  2 64   LYMPHS ABS Thousands/µL  --   --  1 02   MONOS ABS Thousand/µL  --   --  0 60   EOS ABS Thousand/µL  --   --  0 15    < > = values in this interval not displayed  Chemistry Profile:   Results from Last 12 Months   Lab Units 02/03/23  0653 12/15/22  0542   POTASSIUM mmol/L 4 5 4 6   CHLORIDE mmol/L 107 106   CO2 mmol/L 28 31   BUN mg/dL 22 18   CREATININE mg/dL 0 77 0 66   GLUCOSE FASTING mg/dL 86  --    GLUCOSE RANDOM mg/dL  --  107   CALCIUM mg/dL 9 6 8 7   MAGNESIUM mg/dL 2 2  --    AST U/L 17  --    ALT U/L 28  --    ALK PHOS U/L 72  --    EGFR ml/min/1 73sq m 85 98     Endocrine Studies:   Results from Last 12 Months   Lab Units 02/03/23  0653 11/11/22  0650   HEMOGLOBIN A1C %  --  5 2   TSH 3RD GENERATON uIU/mL 1 020  --    FREE T4 ng/dL 0 87  --    VIT D 25 HYDROXY ng/mL 31 4  --      Nutrition Assessment and Intervention:     Reviewed food recall journal      Physical Activity Assessment and Intervention:    Activity journal reviewed    Activity journal completion recommended      Emotional and Mental Well-being, Sleep, Connectedness Assessment and Intervention:    Sleep/stress assessment performed    Depression and anxiety screening performed and reviewed      Radiology/Other Diagnostic Testing Results: I have personally reviewed pertinent reports        Rodriguez Cedeno MD  [facility]

## 2023-02-20 NOTE — ASSESSMENT & PLAN NOTE
S/S for 3 years  Usually starts around 6 pm and prevents going to sleep if awakened at night to go to the bathroom  Restless in bed      Plan:  Increase Lyrica 25 mg 2 50 mg p o  qhs    B12 and folate pending   Follow-up in 2 months

## 2023-02-20 NOTE — ASSESSMENT & PLAN NOTE
Patient has a longstanding history of depression since childhood  PHQ-9 score of 11  Not currently on any medication  Admits to intermittent suicidal ideation thoughts without any concrete plans  No current thoughts in the past 2 weeks  Patient follows up with a therapist twice a month      Plan:  Consider starting antidepressant medication  Patient is agreeable symptoms do not improve with the increase in pregabalin states that her knee pain RLS exacerbate her depression  Courage to call 911 or go to the nearest emergency room if suicidal ideations and plans worsen

## 2023-02-20 NOTE — ASSESSMENT & PLAN NOTE
1/2 ppd  X 40 years current smoker  Used to smoke  ppd for over 20 years  CT lung cancer screening was 2/13/23 was negative      Plan:  CT low dose Lung cancer screening 2024  Devante Silva Smoking cessation encouraged  Patient declined setting a quit date  But stated that she is cutting back slowly

## 2023-02-20 NOTE — ASSESSMENT & PLAN NOTE
Lab Results   Component Value Date    Sodium 134 2/03/2023     Likely due to restricted salt intake in the diet  Plan:  Consider less restrictions on salt diet  BMP pending

## 2023-02-20 NOTE — ASSESSMENT & PLAN NOTE
S/P left knee replacement on 12/14/22  Healing well  PT BID     Plan:  Increase Celebrex dose to 200 mg p o  every morning  Tylenol 1000 mg p o  nightly  Take Lyrica 50 mg p o  nightly  Continue PT twice weekly home exercises  Follow-up in 2 months

## 2023-02-20 NOTE — PROGRESS NOTES
Daily Note     Today's date: 2023  Patient name: Maris Cooney  : 1965  MRN: [id number]  Referring provider: Phyllis Penaloza  Dx:   Encounter Diagnosis     ICD-10-CM    1  Primary osteoarthritis of left knee  M17 12       2  Chronic pain of left knee  M25 562     G89 29                      Subjective: Pt reports having a rough couple of days due to a dinner date she attended on Saturday  She engaged in dancing, though only in moderation and what can she handle  She noticed L knee pain afterward at home which kept her up at night  Pt reports progressing over the past week to reciprocal step pattern both up and down stairs while holding onto railing, without attempting to carry anything yet  Pt agreed to be treated by Fidel PALACIOS under the direct supervision of Rock Fito MCDANIEL  Objective: See treatment diary below      Assessment: Tolerated treatment well  Patient exhibited good technique with therapeutic exercises  Pt would continue to benefit from aqua therapy for the beneficial effect of reducing stress on the knee joint while allowing increased effort with reduced pain levels  Pt reports wishing to challenge her exercise program by including balance activities and gradually progress to land therapy  PT discussed future scheduling with pt for the continuation of land therapy while progressing interventions  Plan: Continue per plan of care  Progress treatment as tolerated         Precautions: Precautions: COPD, Anxiety, Depression, PTSD, FALL RISK  POOL    Manuals     [employer] weights Purple weights Red weights Red weights Red weights           Neuro Re-Ed                        Ther Ex                Laps pre/post                Seated hamstring stretch, strap [id number] 5x:20  5x20s 5x20s   Piriformis stretch [id number] 01djdg3 5x:20 5x 20s 5x20s   Hip flexor str on step 57ffhu7 70gaja4 5x:20  5x 20s 5x20s   LAQ x30 x30 20x x30 x30   SLR x 3 x30 x30 20x x30 x30   Ham curls x30 x30 20x x30 x30   HR x30 x30 20x x30 x30   squats 20x on 2nd step (from bottom) 20x on 2nd step (from bottom) 20x on 2nd step (from bottom) 20x on 2nd step (from bottom) 20x on 2nd step (from bottom)   Knee flex/ext stretch on step knee flexion stretch at step 82qcgb4 knee flexion stretch at step 30vgqb3 5x:20 knee flexion stretch 5x:20 knee flexion stretch 5x:20 knee flexion stretch   sidestepping x5 x5 5x 5x 5x   Tandem walking        bwd walks x5 x5 5x 5x 5x   Pool pony cycling x5 min x5min X 5 min X 5min x5min           Step ups FW, lateral  x20 x20 20x 20x 20x   step downs x20 x20 20x 20x 20x           Paddles:        Rows and extension x30 x30 20x 20x 20x   abd/add x30 x30 20x 20x 20x   Horizontal abd/add x30 x30 20x 20x 20x   Bicep curls x30 x30 20x 20x 20x   Half jacks- large DB x3 min x3 min x3 min  x3min x3 min   Cross country ski- Large DB X 3min x3 min x3 min X 3min x3 min   Standing ab crunch w/ball fwd/lateral x20 ea                                                               Ther Activity                        Gait Training                        Modalities                        Progress as able    Precautions: COPD, Anxiety, Depression, PTSD, FALL RISK  LAND    Manuals 1/20 1/23 1/26 1/12 1/17   Knee PROM flex/ext to tolerance X 10'    x10'  x10m x5' x10m   Patellar mobs  5' x5'                                   Neuro Re-Ed                           SLS   5x:10  5x:10        sidestepping    2# 2x  2# 2x  NV with weights     Tandem walking    2# 2x  2# 2x                                                 Ther Ex              Nu step  x10' L1 Upright bike x 10'  10 min L1 x10' L1  10min           SLR flex        S/l hip abd        SAQ 3#2x10 2x10  3# 3x10  -  AAx10 +pain   LAQ 3# 2x10 2x10  3# 3x10  1 5# 2x10  1 5# 2x10                           Step ups 6 inx20 6" 20x 8" 20x FW/lat  4in fwd & lat x15   Cybex ham curls #15 2x10  15# 2x10     Leg press 85 # 2x10 B, 35# L 2x10   - L 40#  x10  B 105# 20x -    Cybex hip abd 35# 2x10 - 35# 2x10  -            SLR x 3, start with L   2# B 2x10  2# 2x10 ea  2# B 2x10  1 5# 2x10  1 5# 2x10   Ham curls, start with L  2# B 2x10  2# 2x10 ea  2# B 2x10  1 5# 2x10  1 5# 2x10   HR, B, standing  2x10  2x10  x20  x20  x20   Mini squats 2x10 2x10 x20 x20 x20    Supine Knee extension stretch   -          Rises from chair  1 pad 2x10 1 pad 2x10 1 pad 2x10  1 pad 2x10  1# 2x10    supine ham stretch     32hgun1  -  52maxw6                                                                                                                                 Ther Activity                                         Gait Training             Gait training with RW                           Modalities             CP PRN   NT time constraints  x5 min                     Progress as able

## 2023-02-20 NOTE — PATIENT INSTRUCTIONS
Please take Celebrex 200 mg by mouth in the morning  Please take Tylenol 1000 mg by mouth in the evening  Please take Lyrica 50 mg by mouth in the evening  Please take Metamucil twice a day for 2 weeks and then once a day afterwards  Encourage being more liberal with salt restriction  Please take vitamin D 50,000 mg by mouth every week for 8 weeks

## 2023-02-20 NOTE — ASSESSMENT & PLAN NOTE
Patient has a history of vitamin D deficiency  Vitamin D level at 31 which is low normal     Plan:  Vitamin D 50,000 units p o  q  weekly x8 weeks  Follow-up in 2 months

## 2023-02-23 ENCOUNTER — OFFICE VISIT (OUTPATIENT)
Dept: PHYSICAL THERAPY | Age: 58
End: 2023-02-23

## 2023-02-23 DIAGNOSIS — Z96.652 AFTERCARE FOLLOWING LEFT KNEE JOINT REPLACEMENT SURGERY: Primary | ICD-10-CM

## 2023-02-23 DIAGNOSIS — M25.562 CHRONIC PAIN OF LEFT KNEE: ICD-10-CM

## 2023-02-23 DIAGNOSIS — G89.29 CHRONIC PAIN OF LEFT KNEE: ICD-10-CM

## 2023-02-23 DIAGNOSIS — M17.12 PRIMARY OSTEOARTHRITIS OF LEFT KNEE: Primary | ICD-10-CM

## 2023-02-23 DIAGNOSIS — Z47.1 AFTERCARE FOLLOWING LEFT KNEE JOINT REPLACEMENT SURGERY: Primary | ICD-10-CM

## 2023-02-23 NOTE — PROGRESS NOTES
Daily Note     Today's date: 2023  Patient name: Iris Burgos  : 1965  MRN: [id number]  Referring provider: Idris Thomason  Dx:   Encounter Diagnosis     ICD-10-CM    1  Primary osteoarthritis of left knee  M17 12       2  Chronic pain of left knee  M25 562     G89 29                      Subjective: Pt reports L knee pain has improved as week went on  Pt does not some ankle weakness that she noticed with her dancing and recent pain increase      Objective: See treatment diary below      Assessment: Tolerated treatment well  Patient exhibited good technique with therapeutic exercises  Pt would continue to benefit from aqua therapy for the beneficial effect of reducing stress on the knee joint while allowing increased effort with reduced pain levels  Pt reports wishing to challenge her exercise program by including balance activities and gradually progress to land therapy  No pain issues with ex performed  Plan: Continue per plan of care  Progress treatment as tolerated         Precautions: Precautions: COPD, Anxiety, Depression, PTSD, FALL RISK  POOL    Manuals     [employer] weights Purple weights purple weights Red weights Red weights           Neuro Re-Ed                        Ther Ex                Laps pre/post                Seated hamstring stretch, strap [id number] 88pbdi9 5x:20  5x20s 5x20s   Piriformis stretch 86mhnx7 00obpv6 5x:20 5x 20s 5x20s   Hip flexor str on step 14okni2 57hici0 5x:20  5x 20s 5x20s   LAQ x30 x30 30x x30 x30   SLR x 3 x30 x30 30x x30 x30   Ham curls x30 x30 30x x30 x30   HR x30 x30 30x x30 x30   squats 20x on 2nd step (from bottom) 20x on 2nd step (from bottom) 20x on 2nd step (from bottom) 20x on 2nd step (from bottom) 20x on 2nd step (from bottom)   Knee flex/ext stretch on step knee flexion stretch at step 76hlpm8 knee flexion stretch at step 61sdfm0 5x:20 knee flexion stretch 5x:20 knee flexion stretch 5x:20 knee flexion stretch sidestepping x5 x5 5x 5x 5x   Tandem walking        bwd walks x5 x5 5x 5x 5x   Pool pony cycling x5 min x5min X 5 min X 5min x5min           Step ups FW, lateral  x20 x20 20x 20x 20x   step downs x20 x20 20x 20x 20x           Paddles:        Rows and extension x30 x30 30x 20x 20x   abd/add x30 x30 30x 20x 20x   Horizontal abd/add x30 x30 30x 20x 20x   Bicep curls x30 x30 30x 20x 20x   Half jacks- large DB x3 min x3 min x3 min  x3min x3 min   Cross country ski- Large DB X 3min x3 min x3 min X 3min x3 min   Standing ab crunch w/ball fwd/lateral x20 ea  x20ea                                                             Ther Activity                        Gait Training                        Modalities                        Progress as able    Precautions: COPD, Anxiety, Depression, PTSD, FALL RISK  LAND    Manuals 1/20 1/23 1/26 1/12 1/17   Knee PROM flex/ext to tolerance X 10'    x10'  x10m x5' x10m   Patellar mobs  5' x5'                                   Neuro Re-Ed                           SLS   5x:10  5x:10        sidestepping    2# 2x  2# 2x  NV with weights     Tandem walking    2# 2x  2# 2x                                                 Ther Ex              Nu step  x10' L1 Upright bike x 10'  10 min L1 x10' L1  10min           SLR flex        S/l hip abd        SAQ 3#2x10 2x10  3# 3x10  -  AAx10 +pain   LAQ 3# 2x10 2x10  3# 3x10  1 5# 2x10  1 5# 2x10                           Step ups 6 inx20 6" 20x 8" 20x FW/lat  4in fwd & lat x15   Cybex ham curls #15 2x10  15# 2x10     Leg press 85 # 2x10 B, 35# L 2x10   - L 40#  x10  B 105# 20x -    Cybex hip abd 35# 2x10 - 35# 2x10  -            SLR x 3, start with L   2# B 2x10  2# 2x10 ea  2# B 2x10  1 5# 2x10  1 5# 2x10   Ham curls, start with L  2# B 2x10  2# 2x10 ea  2# B 2x10  1 5# 2x10  1 5# 2x10   HR, B, standing  2x10  2x10  x20  x20  x20   Mini squats 2x10 2x10 x20 x20 x20    Supine Knee extension stretch   -          Rises from chair  1 pad 2x10 1 pad 2x10 1 pad 2x10  1 pad 2x10  1# 2x10    supine ham stretch     34sqzv2  -  [id number]                                                                                                                                 Ther Activity                                         Gait Training             Gait training with RW                           Modalities             CP PRN   NT time constraints  x5 min                     Progress as able

## 2023-02-27 ENCOUNTER — OFFICE VISIT (OUTPATIENT)
Dept: PHYSICAL THERAPY | Age: 58
End: 2023-02-27

## 2023-02-27 DIAGNOSIS — G89.29 CHRONIC PAIN OF LEFT KNEE: ICD-10-CM

## 2023-02-27 DIAGNOSIS — M17.12 PRIMARY OSTEOARTHRITIS OF LEFT KNEE: Primary | ICD-10-CM

## 2023-02-27 DIAGNOSIS — M25.562 CHRONIC PAIN OF LEFT KNEE: ICD-10-CM

## 2023-02-27 NOTE — PROGRESS NOTES
Daily Note     Today's date: 2023  Patient name: Emilia Tovar  : 1965  MRN: [id number]  Referring provider: Christie Hull  Dx:   Encounter Diagnosis     ICD-10-CM    1  Primary osteoarthritis of left knee  M17 12       2  Chronic pain of left knee  M25 562     G89 29                      Subjective: Pt feels she is lacking some end range L knee flexion  Pt noted she was dancing over weekend  Pt reports the dancing hurt but once she was warmed up then pain subsided so she could dance      Objective: See treatment diary below      Assessment: Tolerated treatment well  Patient exhibited good technique with therapeutic exercises  Pt would continue to benefit from aqua therapy for the beneficial effect of reducing stress on the knee joint while allowing increased effort with reduced pain levels  Pt reports wishing to challenge her exercise program by including balance activities and gradually progress to independent land therapy  Pt is doing gym ex and said she was up to 121/2 lbs for LAQ   No pain issues with ex performed  Plan: Continue per plan of care  Progress treatment as tolerated         Precautions: Precautions: COPD, Anxiety, Depression, PTSD, FALL RISK  POOL    Manuals     [employer] weights Purple weights purple weights Purple weights Red weights           Neuro Re-Ed                        Ther Ex                Laps pre/post                Seated hamstring stretch, strap [id number] 18unxa1 5x:20  5x20s 5x20s   Piriformis stretch 83cmes2 08rqbv4 5x:20 5x 20s 5x20s   Hip flexor str on step 74fmuf3 69isoi2 5x:20  5x 20s 5x20s   LAQ x30 x30 30x x30 x30   SLR x 3 x30 x30 30x x30 x30   Ham curls x30 x30 30x x30 x30   HR x30 x30 30x x30 x30   squats 20x on 2nd step (from bottom) 20x on 2nd step (from bottom) 20x on 2nd step (from bottom) 20x on 2nd step (from bottom) 20x on 2nd step (from bottom)   Knee flex/ext stretch on step knee flexion stretch at step 81yzvd9 knee flexion stretch at step 49zdiw7 5x:20 knee flexion stretch 5x:20 knee flexion stretch 5x:20 knee flexion stretch   sidestepping x5 x5 5x 5x 5x   Tandem walking        bwd walks x5 x5 5x 5x 5x   Pool pony cycling x5 min x5min X 5 min X 5min x5min           Step ups FW, lateral  x20 x20 20x 20x 20x   step downs x20 x20 20x 20x 20x           Paddles:        Rows and extension x30 x30 30x 30x 20x   abd/add x30 x30 30x 30x 20x   Horizontal abd/add x30 x30 30x 30x 20x   Bicep curls x30 x30 30x 30x 20x   Half jacks- large DB x3 min x3 min x3 min  x3min x3 min   Cross country ski- Large DB X 3min x3 min x3 min X 3min x3 min   Standing ab crunch w/ball fwd/lateral x20 ea  x20ea x20ea                                                            Ther Activity                        Gait Training                        Modalities                        Progress as able    Precautions: COPD, Anxiety, Depression, PTSD, FALL RISK  LAND    Manuals 1/20 1/23 1/26 1/12 1/17   Knee PROM flex/ext to tolerance X 10'    x10'  x10m x5' x10m   Patellar mobs  5' x5'                                   Neuro Re-Ed                           SLS   5x:10  5x:10        sidestepping    2# 2x  2# 2x  NV with weights     Tandem walking    2# 2x  2# 2x                                                 Ther Ex              Nu step  x10' L1 Upright bike x 10'  10 min L1 x10' L1  10min           SLR flex        S/l hip abd        SAQ 3#2x10 2x10  3# 3x10  -  AAx10 +pain   LAQ 3# 2x10 2x10  3# 3x10  1 5# 2x10  1 5# 2x10                           Step ups 6 inx20 6" 20x 8" 20x FW/lat  4in fwd & lat x15   Cybex ham curls #15 2x10  15# 2x10     Leg press 85 # 2x10 B, 35# L 2x10   - L 40#  x10  B 105# 20x -    Cybex hip abd 35# 2x10 - 35# 2x10  -            SLR x 3, start with L   2# B 2x10  2# 2x10 ea  2# B 2x10  1 5# 2x10  1 5# 2x10   Ham curls, start with L  2# B 2x10  2# 2x10 ea  2# B 2x10  1 5# 2x10  1 5# 2x10   HR, B, standing  2x10  2x10  x20  x20  x20   Mini squats 2x10 2x10 x20 x20 x20    Supine Knee extension stretch   -          Rises from chair  1 pad 2x10 1 pad 2x10 1 pad 2x10  1 pad 2x10  1# 2x10    supine ham stretch     20morp9  -  32ebam6                                                                                                                                 Ther Activity                                         Gait Training             Gait training with RW                           Modalities             CP PRN   NT time constraints  x5 min                     Progress as able

## 2023-03-01 ENCOUNTER — OFFICE VISIT (OUTPATIENT)
Dept: PHYSICAL THERAPY | Age: 58
End: 2023-03-01

## 2023-03-01 ENCOUNTER — APPOINTMENT (OUTPATIENT)
Dept: LAB | Age: 58
End: 2023-03-01

## 2023-03-01 DIAGNOSIS — E55.9 VITAMIN D DEFICIENCY: ICD-10-CM

## 2023-03-01 DIAGNOSIS — G25.81 RESTLESS LEG SYNDROME: ICD-10-CM

## 2023-03-01 DIAGNOSIS — G89.29 CHRONIC PAIN OF LEFT KNEE: ICD-10-CM

## 2023-03-01 DIAGNOSIS — M25.562 CHRONIC PAIN OF LEFT KNEE: ICD-10-CM

## 2023-03-01 DIAGNOSIS — M17.12 PRIMARY OSTEOARTHRITIS OF LEFT KNEE: Primary | ICD-10-CM

## 2023-03-01 DIAGNOSIS — E87.1 HYPONATREMIA: ICD-10-CM

## 2023-03-01 LAB — 25(OH)D3 SERPL-MCNC: 47.5 NG/ML (ref 30–100)

## 2023-03-01 NOTE — PROGRESS NOTES
Daily Note     Today's date: 3/1/2023  Patient name: Maury Weems  : 1965  MRN: [id number]  Referring provider: Maryellen Floyd  Dx:   Encounter Diagnosis     ICD-10-CM    1  Primary osteoarthritis of left knee  M17 12       2  Chronic pain of left knee  M25 562     G89 29                      Subjective: Pt feels she is lacking some end range L knee flexion  Pt has recovered from L knee pain with dancing over weekend      Objective: See treatment diary below      Assessment: Tolerated treatment well  Patient exhibited good technique with therapeutic exercises  Pt would continue to benefit from aqua therapy for the beneficial effect of reducing stress on the knee joint while allowing increased effort with reduced pain levels  Pt reports wishing to challenge her exercise program by including balance activities and gradually progress to independent land therapy  Pt is doing gym ex fro strengthening aerobic content to compliment pool sessions  No pain issues with ex performed  Plan: Continue per plan of care  Progress treatment as tolerated         Precautions: Precautions: COPD, Anxiety, Depression, PTSD, FALL RISK  POOL    Manuals 2/16 2/20 2/23 2/27 3/1    [employer] weights Purple weights purple weights Purple weights Purple weights           Neuro Re-Ed                        Ther Ex                Laps pre/post                Seated hamstring stretch, strap 85ivww4 77frok9 5x:20  5x20s 5x20s   Piriformis stretch 92jcvl9 77kkfg8 5x:20 5x 20s 5x20s   Hip flexor str on step 78itgi8 61edtx6 5x:20  5x 20s 5x20s   LAQ x30 x30 30x x30 x30   SLR x 3 x30 x30 30x x30 x30   Ham curls x30 x30 30x x30 x30   HR x30 x30 30x x30 x30   squats 20x on 2nd step (from bottom) 20x on 2nd step (from bottom) 20x on 2nd step (from bottom) 20x on 2nd step (from bottom) 20x on 2nd step (from bottom)   Knee flex/ext stretch on step knee flexion stretch at step 19jvwz3 knee flexion stretch at step 75urny5 5x:20 knee flexion stretch 5x:20 knee flexion stretch 5x:20 knee flexion stretch   sidestepping x5 x5 5x 5x 5x   Tandem walking        bwd walks x5 x5 5x 5x 5x   Pool pony cycling x5 min x5min X 5 min X 5min x5min           Step ups FW, lateral  x20 x20 20x 20x 20x   step downs x20 x20 20x 20x 20x           Paddles:        Rows and extension x30 x30 30x 30x 30x   abd/add x30 x30 30x 30x 30x   Horizontal abd/add x30 x30 30x 30x 30x   Bicep curls x30 x30 30x 30x 30x   Half jacks- large DB x3 min x3 min x3 min  x3min x3 min   Cross country ski- Large DB X 3min x3 min x3 min X 3min x3 min   Standing ab crunch w/ball fwd/lateral x20 ea  x20ea x20ea x20 ea                                                           Ther Activity                        Gait Training                        Modalities                        Progress as able    Precautions: COPD, Anxiety, Depression, PTSD, FALL RISK  LAND    Manuals 1/20 1/23 1/26 1/12 1/17   Knee PROM flex/ext to tolerance X 10'    x10'  x10m x5' x10m   Patellar mobs  5' x5'                                   Neuro Re-Ed                           SLS   5x:10  5x:10        sidestepping    2# 2x  2# 2x  NV with weights     Tandem walking    2# 2x  2# 2x                                                 Ther Ex              Nu step  x10' L1 Upright bike x 10'  10 min L1 x10' L1  10min           SLR flex        S/l hip abd        SAQ 3#2x10 2x10  3# 3x10  -  AAx10 +pain   LAQ 3# 2x10 2x10  3# 3x10  1 5# 2x10  1 5# 2x10                           Step ups 6 inx20 6" 20x 8" 20x FW/lat  4in fwd & lat x15   Cybex ham curls #15 2x10  15# 2x10     Leg press 85 # 2x10 B, 35# L 2x10   - L 40#  x10  B 105# 20x -    Cybex hip abd 35# 2x10 - 35# 2x10  -            SLR x 3, start with L   2# B 2x10  2# 2x10 ea  2# B 2x10  1 5# 2x10  1 5# 2x10   Ham curls, start with L  2# B 2x10  2# 2x10 ea  2# B 2x10  1 5# 2x10  1 5# 2x10   HR, B, standing  2x10  2x10  x20  x20  x20   Mini squats 2x10 2x10 x20 x20 x20  Supine Knee extension stretch   -          Rises from chair  1 pad 2x10 1 pad 2x10 1 pad 2x10  1 pad 2x10  1# 2x10    supine ham stretch     10fcsw5  -  59iggx1                                                                                                                                 Ther Activity                                         Gait Training             Gait training with RW                           Modalities             CP PRN   NT time constraints  x5 min                     Progress as able

## 2023-03-02 LAB
ANION GAP SERPL CALCULATED.3IONS-SCNC: 1 MMOL/L (ref 4–13)
BUN SERPL-MCNC: 17 MG/DL (ref 5–25)
CALCIUM SERPL-MCNC: 10.1 MG/DL (ref 8.3–10.1)
CHLORIDE SERPL-SCNC: 106 MMOL/L (ref 96–108)
CO2 SERPL-SCNC: 27 MMOL/L (ref 21–32)
CREAT SERPL-MCNC: 0.78 MG/DL (ref 0.6–1.3)
FOLATE SERPL-MCNC: >20 NG/ML (ref 3.1–17.5)
GFR SERPL CREATININE-BSD FRML MDRD: 84 ML/MIN/1.73SQ M
GLUCOSE P FAST SERPL-MCNC: 82 MG/DL (ref 65–99)
POTASSIUM SERPL-SCNC: 4 MMOL/L (ref 3.5–5.3)
SODIUM SERPL-SCNC: 134 MMOL/L (ref 135–147)
VIT B12 SERPL-MCNC: 448 PG/ML (ref 100–900)

## 2023-03-03 ENCOUNTER — TELEPHONE (OUTPATIENT)
Dept: OTHER | Facility: OTHER | Age: 58
End: 2023-03-03

## 2023-03-03 DIAGNOSIS — G25.81 RESTLESS LEG SYNDROME: ICD-10-CM

## 2023-03-03 NOTE — TELEPHONE ENCOUNTER
Pt is almost out of her Lyrica and needs a New Rx written for Lyrica 50 mg  Also, please call to discuss Lab Results

## 2023-03-06 ENCOUNTER — TELEPHONE (OUTPATIENT)
Dept: INTERNAL MEDICINE CLINIC | Facility: CLINIC | Age: 58
End: 2023-03-06

## 2023-03-06 ENCOUNTER — OFFICE VISIT (OUTPATIENT)
Dept: PHYSICAL THERAPY | Age: 58
End: 2023-03-06

## 2023-03-06 DIAGNOSIS — M17.12 PRIMARY OSTEOARTHRITIS OF LEFT KNEE: Primary | ICD-10-CM

## 2023-03-06 DIAGNOSIS — M25.562 CHRONIC PAIN OF LEFT KNEE: ICD-10-CM

## 2023-03-06 DIAGNOSIS — G89.29 CHRONIC PAIN OF LEFT KNEE: ICD-10-CM

## 2023-03-06 RX ORDER — PREGABALIN 50 MG/1
50 CAPSULE ORAL
Qty: 30 CAPSULE | Refills: 0 | Status: SHIPPED | OUTPATIENT
Start: 2023-03-06

## 2023-03-06 RX ORDER — PREGABALIN 25 MG/1
25 CAPSULE ORAL
Qty: 30 CAPSULE | Refills: 1 | Status: CANCELLED | OUTPATIENT
Start: 2023-03-06

## 2023-03-06 NOTE — TELEPHONE ENCOUNTER
Pt is out, confirmed she is taking the 50mg per her last OV - please send in ASAP      OK to [facility] advising medication was sent into the pharmacy as she may not be able to answer

## 2023-03-06 NOTE — TELEPHONE ENCOUNTER
Pt called requesting a refill of her Lyrica but she said it needs to be 50 mg  Can this be sent to [facility] in [place]?

## 2023-03-06 NOTE — PROGRESS NOTES
Daily Note     Today's date: 3/6/2023  Patient name: Adebayo Sosa  : 1965  MRN: [id number]  Referring provider: Anna Radford  Dx:   Encounter Diagnosis     ICD-10-CM    1  Primary osteoarthritis of left knee  M17 12       2  Chronic pain of left knee  M25 562     G89 29                      Subjective: Pt reports she has a follow up with Dr Khanh Aviles on 3/8  Objective: See treatment diary below      Assessment: Pt progressing well with aquatic PT program, maximizing LE cuff wt resistance available  Tolerated treatment well  Patient demonstrated fatigue post treatment, exhibited good technique with therapeutic exercises and would benefit from continued PT      Plan: Continue per plan of care  Progress treatment as tolerated         Precautions: Precautions: COPD, Anxiety, Depression, PTSD, FALL RISK  POOL    Manuals 3/6 2/20 2/23 2/27 3/1    Purple Cuff Wts Purple weights purple weights Purple weights Purple weights           Neuro Re-Ed                        Ther Ex                Laps pre/post                Seated hamstring stretch, strap 20hlzp5 94veth5 5x:20  5x20s 5x20s   Piriformis stretch 07kegl9 85zzze6 5x:20 5x 20s 5x20s   Hip flexor str on step 40klij4 77sida6 5x:20  5x 20s 5x20s   LAQ x30 x30 30x x30 x30   SLR x 3 x30 x30 30x x30 x30   Ham curls x30 x30 30x x30 x30   HR x30 x30 30x x30 x30   squats 20x on 2nd step (from bottom) 20x on 2nd step (from bottom) 20x on 2nd step (from bottom) 20x on 2nd step (from bottom) 20x on 2nd step (from bottom)   Knee flex/ext stretch on step knee flexion stretch at step 72vtvg6 knee flexion stretch at step 43addc1 5x:20 knee flexion stretch 5x:20 knee flexion stretch 5x:20 knee flexion stretch   sidestepping x5 x5 5x 5x 5x   Tandem walking        bwd walks x5 x5 5x 5x 5x   Pool pony cycling x5 min x5min X 5 min X 5min x5min           Step ups FW, lateral  x20 on 2nd step x20 20x 20x 20x   step downs x20 x20 20x 20x 20x           Paddles: Rows and extension x30 lg DB x30 30x 30x 30x   abd/add x30 lg DB x30 30x 30x 30x   Horizontal abd/add x30 lg DB x30 30x 30x 30x   Bicep curls x30lg DB x30 30x 30x 30x   Half jacks- large DB x3 min x3 min x3 min  x3min x3 min   Cross country ski- Large DB X 3min x3 min x3 min X 3min x3 min   Standing ab crunch w/ball fwd/lateral x20 ea  x20ea x20ea x20 ea                                                           Ther Activity                        Gait Training                        Modalities                        Progress as able    Precautions: COPD, Anxiety, Depression, PTSD, FALL RISK  LAND    Manuals 1/20 1/23 1/26 1/12 1/17   Knee PROM flex/ext to tolerance X 10'    x10'  x10m x5' x10m   Patellar mobs  5' x5'                                   Neuro Re-Ed                           SLS   5x:10  5x:10        sidestepping    2# 2x  2# 2x  NV with weights     Tandem walking    2# 2x  2# 2x                                                 Ther Ex              Nu step  x10' L1 Upright bike x 10'  10 min L1 x10' L1  10min           SLR flex        S/l hip abd        SAQ 3#2x10 2x10  3# 3x10  -  AAx10 +pain   LAQ 3# 2x10 2x10  3# 3x10  1 5# 2x10  1 5# 2x10                           Step ups  6" 20x 8" 20x FW/lat  4in fwd & lat x15   Cybex ham curls #15 2x10  15# 2x10     Leg press 85 # 2x10 B, 35# L 2x10   - L 40#  x10  B 105# 20x -    Cybex hip abd 35# 2x10 - 35# 2x10  -            SLR x 3, start with L   2# B 2x10  2# 2x10 ea  2# B 2x10  1 5# 2x10  1 5# 2x10   Ham curls, start with L  2# B 2x10  2# 2x10 ea  2# B 2x10  1 5# 2x10  1 5# 2x10   HR, B, standing  2x10  2x10  x20  x20  x20   Mini squats 2x10 2x10 x20 x20 x20    Supine Knee extension stretch   -          Rises from chair  1 pad 2x10 1 pad 2x10 1 pad 2x10  1 pad 2x10  1# 2x10    supine ham stretch     95blvv8  -  53vymr7                                                                                                                                 Ther Activity                                         Gait Training             Gait training with RW                           Modalities             CP PRN   NT time constraints  x5 min                     Progress as able

## 2023-03-08 ENCOUNTER — OFFICE VISIT (OUTPATIENT)
Dept: OBGYN CLINIC | Facility: HOSPITAL | Age: 58
End: 2023-03-08

## 2023-03-08 ENCOUNTER — HOSPITAL ENCOUNTER (OUTPATIENT)
Dept: RADIOLOGY | Facility: HOSPITAL | Age: 58
Discharge: HOME | End: 2023-03-08
Attending: ORTHOPAEDIC SURGERY

## 2023-03-08 VITALS
DIASTOLIC BLOOD PRESSURE: 66 MMHG | HEART RATE: 62 BPM | HEIGHT: 63 IN | BODY MASS INDEX: 22.25 KG/M2 | SYSTOLIC BLOOD PRESSURE: 123 MMHG

## 2023-03-08 DIAGNOSIS — Z96.652 AFTERCARE FOLLOWING LEFT KNEE JOINT REPLACEMENT SURGERY: Primary | ICD-10-CM

## 2023-03-08 DIAGNOSIS — Z96.652 AFTERCARE FOLLOWING LEFT KNEE JOINT REPLACEMENT SURGERY: ICD-10-CM

## 2023-03-08 DIAGNOSIS — Z47.1 AFTERCARE FOLLOWING LEFT KNEE JOINT REPLACEMENT SURGERY: Primary | ICD-10-CM

## 2023-03-08 DIAGNOSIS — Z47.1 AFTERCARE FOLLOWING LEFT KNEE JOINT REPLACEMENT SURGERY: ICD-10-CM

## 2023-03-08 RX ORDER — CEPHALEXIN 500 MG/1
CAPSULE ORAL
Qty: 40 CAPSULE | Refills: 0 | Status: SHIPPED | OUTPATIENT
Start: 2023-03-08 | End: 2023-03-15

## 2023-03-08 NOTE — PROGRESS NOTES
Assessment:  1  Aftercare following left knee joint replacement surgery            Plan:  12 week s/p left TKA with robot, 2022  The patient is doing well   We discussed continues strengthening of her LLE  ROM is doing well  Discussed Prophylactic antibiotics before dental procedures for the next two years         To do next visit:  Return in about 3 months (around 2023) for Recheck  The above stated was discussed in layman's terms and the patient expressed understanding  All questions were answered to the patient's satisfaction  Scribe Attestation    I,:  Luz Marina Lange am acting as a scribe while in the presence of the attending physician :       I,:  Jaimie Ulloa MD personally performed the services described in this documentation    as scribed in my presence :             Subjective:   Maury Weems is a 62 y o  female who presents 6 weeks s/p left TKA with robot, 2022  Pt states she still feels like her motion, flexion and extension, is still slightly limited  She has tightness with going down steps, but strength is improving  She is finishing therapy soon and still going to the gym   No pain reported    Review of systems negative unless otherwise specified in HPI    Past Medical History:   Diagnosis Date   • Alcoholism ([facility] [address] ) 95/18    recovering alcoholic, drug addict/compulsive over-eater   • Anemia    • Anxiety    • Arthritis    • Callus    • Complete Tear of the Anterior cruciate ligament tear of the knee    • COPD (chronic obstructive pulmonary disease) (HCC)    • Depression    • Difficulty walking    • Exposure to hepatitis B     Resolved: 17   • History of cholesteatoma     right ear   • History of COVID-19 2022    mild s/s   • History of prior pregnancies     A1   • Irritable bowel syndrome    • Lung disease    • Papanicolaou smear 2010    NEG   • Plantar fasciitis    • PONV (postoperative nausea and vomiting)    • Reactive airway disease     Last assessed: 16       Past Surgical History:   Procedure Laterality Date   • BREAST CYST ASPIRATION Right    • COLONOSCOPY     • CYSTECTOMY Left     benign cystectomy below breast   • DILATION AND CURETTAGE OF UTERUS     • FACIAL RECONSTRUCTION SURGERY     • FOOT SURGERY Right     endoscopic plantar fasciotomy   • GUM SURGERY  2019    with bone graft   • KNEE ARTHROSCOPY Left 1997    menisectomy   • KNEE ARTHROSCOPY Left 10/1997    ACL repair   • MAMMO (HISTORICAL) Bilateral 2010    benign   • MYRINGOTOMY W/ TUBES Right 2019    Procedure: MYRINGOTOMY W/ INSERTION VENTILATION TUBE EAR;  Surgeon: Lizz Haywood DO;  [facility];  Service: ENT   • ID ARTHRP KNE CONDYLE&PLATU MEDIAL&LAT COMPARTMENTS Left 2022    Procedure: ARTHROPLASTY KNEE TOTAL W ROBOT with screw removal left proximal tibia;   Surgeon: Heaven Park MD;  Location: [facility];  Service: Orthopedics   • ID CORRECTION HAMMERTOE Right 2022    Procedure: REPAIR HAMMERTOE 2nd;  Surgeon: Alison Talley DPM;  Location: [address] [place] [address] MAIN OR;  Service: Podiatry   • ID SURGICAL ARTHROSCOPY SHOULDER W/ROTATOR CUFF RPR Left 2018    Procedure: SHOULDER ARTHROSCOPY ROTATOR CUFF REPAIR; SUBACROMIAL DECOMPRESSION;  Surgeon: Yadira Borrego MD;  [facility];  Service: Orthopedics   • ID TMPP MASTOIDECT NTC/RCNSTED WALL W/O OCR Right 2019    Procedure: TYMPANOPLASTY;  Surgeon: Lizz Haywood DO;  Location[facility];  Service: ENT   • SHOULDER SURGERY     • TONSILLECTOMY AND ADENOIDECTOMY     • TUBAL LIGATION     • VAGINAL DELIVERY     • WISDOM TOOTH EXTRACTION         Family History   Problem Relation Age of Onset   • Mental illness Mother    • Substance Abuse Mother    • Lung cancer Mother    • Cancer Mother            • Hyperlipidemia Mother    • Hypertension Father         Benign Essential   • Cataracts Father    • Diabetes Father            • Mental illness Father    • Hyperlipidemia Father    • Depression Father    • Cancer Father            • Schizophrenia Sister    • Squamous cell carcinoma Sister    • Diabetes Sister    • Hypertension Sister    • Mental illness Sister    • Cancer Sister    • Substance Abuse Sister    • Cancer Maternal Grandmother            • Diabetes Maternal Grandmother    • Heart failure Maternal Grandfather    • Lung cancer Paternal Grandmother    • Hyperlipidemia Paternal Grandmother    • Cancer Paternal Grandmother            • Hyperlipidemia Paternal Grandfather    • No Known Problems Son    • Pulmonary fibrosis Paternal Aunt    • Schizophrenia Paternal Aunt    • Diabetes Paternal Aunt    • Mental illness Paternal Aunt    • Diabetes Brother    • Hypertension Brother    • Hyperlipidemia Brother    • Diabetes Brother    • Hypertension Brother    • Diabetes Brother         possibly one or both   • Mental illness Paternal Aunt        Social History     Occupational History   • Not on file   Tobacco Use   • Smoking status: Every Day     Packs/day: 0 75     Years: 40 00     Pack years: 30 00     Types: Cigarettes     Start date: 1980   • Smokeless tobacco: Never   Vaping Use   • Vaping Use: Never used   Substance and Sexual Activity   • Alcohol use: Not Currently     Comment: Quit 23 years ago   Recovering alcoholic   • Drug use: Not Currently     Types: Cocaine, "Crack" cocaine     Comment: clean since    • Sexual activity: Yes     Partners: Male     Birth control/protection: None         Current Outpatient Medications:   •  acetaminophen (TYLENOL) 500 mg tablet, Take 2 tablets (1,000 mg total) by mouth daily at bedtime, Disp: 20 tablet, Rfl: 0  •  albuterol (PROVENTIL HFA,VENTOLIN HFA) 90 mcg/act inhaler, Inhale 2 puffs every 4 (four) hours as needed, Disp: , Rfl:   •  celecoxib (CeleBREX) 200 mg capsule, Take 1 capsule (200 mg total) by mouth 2 (two) times a day, Disp: 180 capsule, Rfl: 0  • dextromethorphan-guaifenesin (MUCINEX DM)  MG per 12 hr tablet, in the morning, Disp: , Rfl:   •  ergocalciferol (VITAMIN D2) 50,000 units, Take 1 capsule (50,000 Units total) by mouth once a week for 8 days, Disp: 8 capsule, Rfl: 0  •  Eszopiclone (LUNESTA PO), Take 1 tablet by mouth in the morning (Patient not taking: Reported on 2/20/2023), Disp: , Rfl:   •  ferrous sulfate 324 (65 Fe) mg, Take one tablet daily  , Disp: 30 tablet, Rfl: 0  •  ipratropium (ATROVENT) 0 02 % nebulizer solution, Take 0 5 mg by nebulization 2 (two) times a day, Disp: , Rfl:   •  Loperamide HCl (IMODIUM PO), Take 2 mg by mouth in the morning  , Disp: , Rfl:   •  LORazepam (ATIVAN) 0 5 mg tablet, Take 1 tablet (0 5 mg total) by mouth daily at bedtime, Disp: 30 tablet, Rfl: 2  •  MELATONIN PO, Take 10 mg by mouth daily at bedtime, Disp: , Rfl:   •  Multiple Minerals-Vitamins (ROSLYN-MAG-ZINC-D PO), Take 1 tablet by mouth daily, Disp: , Rfl:   •  Multiple Vitamins-Minerals (multivitamin with minerals) tablet, Take 1 tablet by mouth daily, Disp: , Rfl:   •  pregabalin (LYRICA) 50 mg capsule, Take 1 capsule (50 mg total) by mouth daily at bedtime, Disp: 30 capsule, Rfl: 0  •  zolpidem (AMBIEN) 10 mg tablet, TAKE ONE TABLET BY MOUTH AT BEDTIME IF NEEDED FOR SLEEP, Disp: 30 tablet, Rfl: 5    Allergies   Allergen Reactions   • Percocet [Oxycodone-Acetaminophen] GI Intolerance   • Metronidazole GI Intolerance            Vitals:    03/08/23 1346   BP: 123/66   Pulse: 62       Objective:  Physical exam  · General: Awake, Alert, Oriented  · Eyes: Pupils equal, round and reactive to light  · Heart: regular rate and rhythm  · Lungs: No audible wheezing  · Abdomen: soft                    Left Knee Exam     Muscle Strength   The patient has normal left knee strength  Tenderness   The patient is experiencing no tenderness       Range of Motion   Extension: -5   Flexion: 130     Other   Scars: present  Sensation: normal  Pulse: present  Swelling: none  Effusion: no effusion present          Left knee:  Well healed scar  No erythema and no ecchymosis  Appropriate warmth of knee  Extensor mechanism intact  Calf compartments soft and supple  Sensation intact  Toes are warm sensate and mobile      Diagnostics, reviewed and taken today if performed as documented:    None performed     Procedures, if performed today:    Procedures    None performed      Portions of the record may have been created with voice recognition software  Occasional wrong word or "sound a like" substitutions may have occurred due to the inherent limitations of voice recognition software  Read the chart carefully and recognize, using context, where substitutions have occurred

## 2023-03-09 ENCOUNTER — OFFICE VISIT (OUTPATIENT)
Dept: PHYSICAL THERAPY | Age: 58
End: 2023-03-09

## 2023-03-09 DIAGNOSIS — M17.12 PRIMARY OSTEOARTHRITIS OF LEFT KNEE: Primary | ICD-10-CM

## 2023-03-09 DIAGNOSIS — G89.29 CHRONIC PAIN OF LEFT KNEE: ICD-10-CM

## 2023-03-09 DIAGNOSIS — M25.562 CHRONIC PAIN OF LEFT KNEE: ICD-10-CM

## 2023-03-09 NOTE — PROGRESS NOTES
Daily Note     Today's date: 3/9/2023  Patient name: Emilia Tovar  : 1965  MRN: [id number]  Referring provider: Christie Hull  Dx:   Encounter Diagnosis     ICD-10-CM    1  Primary osteoarthritis of left knee  M17 12       2  Chronic pain of left knee  M25 562     G89 29                      Subjective: Pt reports she had follow up with MD yesterday  Pt has been released from PT as pt feels pt is doing well       Objective: See treatment diary below      Assessment: Pt progressing well with aquatic PT program, maximizing LE cuff wt resistance available  Tolerated treatment well  Patient has access to gym( she is already attending) and a pool if needed   Pt competent with home program for D/C      Plan: D/C PT to Home Program     Precautions: Precautions: COPD, Anxiety, Depression, PTSD, FALL RISK  POOL    Manuals 3/6 3/9 2/23 2/27 3/1    Purple Cuff Wts Purple weights purple weights Purple weights Purple weights           Neuro Re-Ed                        Ther Ex                Laps pre/post                Seated hamstring stretch, strap [id number] 5x:20  5x20s 5x20s   Piriformis stretch [id number] 5x:20 5x 20s 5x20s   Hip flexor str on step [id number] 5x:20  5x 20s 5x20s   LAQ x30 x30 30x x30 x30   SLR x 3 x30 x30 30x x30 x30   Ham curls x30 x30 30x x30 x30   HR x30 x30 30x x30 x30   squats 20x on 2nd step (from bottom) 20x on 2nd step (from bottom) 20x on 2nd step (from bottom) 20x on 2nd step (from bottom) 20x on 2nd step (from bottom)   Knee flex/ext stretch on step knee flexion stretch at step [id number] knee flexion stretch at step [id number] 5x:20 knee flexion stretch 5x:20 knee flexion stretch 5x:20 knee flexion stretch   sidestepping x5 x5 5x 5x 5x   Tandem walking        bwd walks x5 x5 5x 5x 5x   Pool pony cycling x5 min x5min X 5 min X 5min x5min           Step ups FW, lateral  x20 on 2nd step x20 20x 20x 20x   step downs x20 x20 20x 20x 20x           Paddles:        Rows and extension x30 lg DB x30 30x 30x 30x   abd/add x30 lg DB x30 30x 30x 30x   Horizontal abd/add x30 lg DB x30 30x 30x 30x   Bicep curls x30lg DB x30 30x 30x 30x   Half jacks- large DB x3 min x3 min x3 min  x3min x3 min   Cross country ski- Large DB X 3min x3 min x3 min X 3min x3 min   Standing ab crunch w/ball fwd/lateral x20 ea x20 ea x20ea x20ea x20 ea                                                           Ther Activity                        Gait Training                        Modalities                        Progress as able    Precautions: COPD, Anxiety, Depression, PTSD, FALL RISK  LAND    Manuals 1/20 1/23 1/26 1/12 1/17   Knee PROM flex/ext to tolerance X 10'    x10'  x10m x5' x10m   Patellar mobs  5' x5'                                   Neuro Re-Ed                           SLS   5x:10  5x:10        sidestepping    2# 2x  2# 2x  NV with weights     Tandem walking    2# 2x  2# 2x                                                 Ther Ex              Nu step  x10' L1 Upright bike x 10'  10 min L1 x10' L1  10min           SLR flex        S/l hip abd        SAQ 3#2x10 2x10  3# 3x10  -  AAx10 +pain   LAQ 3# 2x10 2x10  3# 3x10  1 5# 2x10  1 5# 2x10                           Step ups  6" 20x 8" 20x FW/lat  4in fwd & lat x15   Cybex ham curls #15 2x10  15# 2x10     Leg press 85 # 2x10 B, 35# L 2x10   - L 40#  x10  B 105# 20x -    Cybex hip abd 35# 2x10 - 35# 2x10  -            SLR x 3, start with L   2# B 2x10  2# 2x10 ea  2# B 2x10  1 5# 2x10  1 5# 2x10   Ham curls, start with L  2# B 2x10  2# 2x10 ea  2# B 2x10  1 5# 2x10  1 5# 2x10   HR, B, standing  2x10  2x10  x20  x20  x20   Mini squats 2x10 2x10 x20 x20 x20    Supine Knee extension stretch   -          Rises from chair  1 pad 2x10 1 pad 2x10 1 pad 2x10  1 pad 2x10  1# 2x10    supine ham stretch     [id number]  -  [id number]                                                                                                                                 Ther Activity                                         Gait Training             Gait training with RW                           Modalities             CP PRN   NT time constraints  x5 min                     Progress as able

## 2023-03-18 PROBLEM — Z12.4 SCREENING FOR CERVICAL CANCER: Status: RESOLVED | Noted: 2023-01-17 | Resolved: 2023-03-18

## 2023-04-04 DIAGNOSIS — G25.81 RESTLESS LEG SYNDROME: ICD-10-CM

## 2023-04-04 RX ORDER — PREGABALIN 50 MG/1
50 CAPSULE ORAL
Qty: 30 CAPSULE | Refills: 0 | Status: SHIPPED | OUTPATIENT
Start: 2023-04-04

## 2023-04-14 DIAGNOSIS — E55.9 VITAMIN D DEFICIENCY: Primary | ICD-10-CM

## 2023-04-19 ENCOUNTER — OFFICE VISIT (OUTPATIENT)
Dept: INTERNAL MEDICINE CLINIC | Facility: CLINIC | Age: 58
End: 2023-04-19

## 2023-04-19 VITALS
TEMPERATURE: 97.5 F | DIASTOLIC BLOOD PRESSURE: 58 MMHG | BODY MASS INDEX: 22.18 KG/M2 | OXYGEN SATURATION: 99 % | SYSTOLIC BLOOD PRESSURE: 110 MMHG | HEART RATE: 68 BPM | HEIGHT: 63 IN | WEIGHT: 125.2 LBS

## 2023-04-19 DIAGNOSIS — Z13.6 SCREENING FOR CARDIOVASCULAR CONDITION: ICD-10-CM

## 2023-04-19 DIAGNOSIS — G25.81 RESTLESS LEG SYNDROME: ICD-10-CM

## 2023-04-19 DIAGNOSIS — G47.00 INSOMNIA, UNSPECIFIED TYPE: ICD-10-CM

## 2023-04-19 DIAGNOSIS — F41.9 ANXIETY: ICD-10-CM

## 2023-04-19 DIAGNOSIS — R89.9 ABNORMAL LABORATORY TEST: ICD-10-CM

## 2023-04-19 DIAGNOSIS — Z72.0 TOBACCO ABUSE: ICD-10-CM

## 2023-04-19 DIAGNOSIS — Z23 ENCOUNTER FOR IMMUNIZATION: ICD-10-CM

## 2023-04-19 DIAGNOSIS — Z13.820 SCREENING FOR OSTEOPOROSIS: ICD-10-CM

## 2023-04-19 DIAGNOSIS — Z00.00 WELLNESS EXAMINATION: ICD-10-CM

## 2023-04-19 DIAGNOSIS — R13.14 PHARYNGOESOPHAGEAL DYSPHAGIA: ICD-10-CM

## 2023-04-19 DIAGNOSIS — J44.9 CHRONIC OBSTRUCTIVE PULMONARY DISEASE, UNSPECIFIED COPD TYPE (HCC): Primary | ICD-10-CM

## 2023-04-19 DIAGNOSIS — Z12.31 ENCOUNTER FOR SCREENING MAMMOGRAM FOR BREAST CANCER: ICD-10-CM

## 2023-04-19 RX ORDER — ZOLPIDEM TARTRATE 10 MG/1
10 TABLET ORAL
Qty: 30 TABLET | Refills: 0 | Status: SHIPPED | OUTPATIENT
Start: 2023-04-19

## 2023-04-19 RX ORDER — LORAZEPAM 0.5 MG/1
0.5 TABLET ORAL
Qty: 30 TABLET | Refills: 0 | Status: SHIPPED | OUTPATIENT
Start: 2023-04-19

## 2023-04-19 RX ORDER — CA/D3/MAG OX/ZINC/COP/MANG/BOR 600 MG-800
1 TABLET,CHEWABLE ORAL DAILY
Start: 2023-04-19

## 2023-04-19 RX ORDER — ALBUTEROL SULFATE 90 UG/1
2 AEROSOL, METERED RESPIRATORY (INHALATION) EVERY 4 HOURS PRN
Qty: 1 G | Refills: 5 | Status: SHIPPED | OUTPATIENT
Start: 2023-04-19

## 2023-04-19 RX ORDER — PREGABALIN 50 MG/1
50 CAPSULE ORAL
Qty: 30 CAPSULE | Refills: 0 | Status: SHIPPED | OUTPATIENT
Start: 2023-04-19

## 2023-04-19 RX ORDER — LEVOCETIRIZINE DIHYDROCHLORIDE 5 MG/1
5 TABLET, FILM COATED ORAL DAILY
COMMUNITY
Start: 2023-04-19

## 2023-04-19 NOTE — PROGRESS NOTES
[facility]    NAME: Lalita Larkin  AGE: 62 y o  SEX: female  : 1965     DATE: 2023     Assessment and Plan:     Problem List Items Addressed This Visit        Digestive    Pharyngoesophageal dysphagia     We will obtain GI consultation for EGD and possible dilatation         Relevant Orders    Ambulatory Referral to Gastroenterology       Respiratory    COPD (chronic obstructive pulmonary disease) ([facility] [address] ) - Primary     Clinically stable and doing well encourage patient to quit smoking         Relevant Medications    levocetirizine (XYZAL) 5 MG tablet    albuterol (PROVENTIL HFA,VENTOLIN HFA) 90 mcg/act inhaler       Other    Restless leg syndrome     Patient did request refills of Lyrica which is helpful and she tolerates PDMP checked Rx for Lyrica refilled         Relevant Medications    pregabalin (LYRICA) 50 mg capsule    Encounter for immunization    Wellness examination     Assessment and plan 1  Health maintenance annual wellness examination overall the patient is clinically stable and doing well, we encouraged the patient to follow a healthy and balanced diet  We recommend that the patient exercise routinely approximately 30 minutes 5 times per week   We have reviewed the patient's vaccines and have made recommendations for updates if necessary   annual flu shot in the fall recommended     We will be ordering screening laboratories which are age appropriate  Return to the office in   6 months   call if any problems           Insomnia    Relevant Medications    zolpidem (AMBIEN) 10 mg tablet    Screening for osteoporosis    Relevant Medications    Calcium Carbonate-Vit D-Min (Caltrate 600+D Plus Minerals) 600-800 MG-UNIT CHEW    Other Relevant Orders    DXA bone density spine hip and pelvis    Abnormal laboratory test     Low anion gap check SPEP/UPEP         Relevant Orders    Protein electrophoresis, serum Protein electrophoresis, urine    Anxiety     Currently stable patient did request refill for Ativan 0 5 mg 1 tablet once a day at nighttime as needed no alcohol no driving not to be taking when taking Ambien PDMP checked medication refilled         Relevant Medications    LORazepam (ATIVAN) 0 5 mg tablet    Tobacco abuse     I have counseled the patient to quit smoking, I have recommended that the patient set up a quit date and I have asked the patient to cut down the cigarettes until the quit date  Other Visit Diagnoses     Encounter for screening mammogram for breast cancer        Relevant Orders    Mammo screening bilateral w 3d & cad    Screening for cardiovascular condition        Relevant Orders    Lipid Panel with Direct LDL reflex          Immunizations and preventive care screenings were discussed with patient today  Appropriate education was printed on patient's after visit summary  Counseling:  Exercise: the importance of regular exercise/physical activity was discussed  Recommend exercise 3-5 times per week for at least 30 minutes  Return in about 6 months (around 10/19/2023)  Chief Complaint:     Chief Complaint   Patient presents with   • Physical Exam     Physical exam      History of Present Illness:     Adult Annual Physical   Patient here for a comprehensive physical exam  The patient reports no problems  Diet and Physical Activity  Diet/Nutrition: well balanced diet  Exercise: walking  Depression Screening  PHQ-2/9 Depression Screening         General Health  Sleep: gets 4-6 hours of sleep on average  Hearing: normal - bilateral   Vision: no vision problems  Dental: regular dental visits  /GYN Health  Patient is: postmenopausal  Last menstrual period: 46  Contraceptive method:      Review of Systems:     Review of Systems   Constitutional: Negative for activity change, appetite change and unexpected weight change     HENT: Negative for congestion and postnasal drip  Eyes: Negative for visual disturbance  Respiratory: Negative for cough and shortness of breath  Cardiovascular: Negative for chest pain  Gastrointestinal: Negative for abdominal pain, diarrhea, nausea and vomiting  Neurological: Negative for dizziness, light-headedness and headaches  Hematological: Negative for adenopathy        Past Medical History:     Past Medical History:   Diagnosis Date   • Alcoholism ([facility] ) 95/18    recovering alcoholic, drug addict/compulsive over-eater   • Anemia    • Anxiety    • Arthritis    • Callus    • Complete Tear of the Anterior cruciate ligament tear of the knee    • COPD (chronic obstructive pulmonary disease) ([facility] )    • Depression    • Difficulty walking    • Exposure to hepatitis B     Resolved: 17   • History of cholesteatoma     right ear   • History of COVID-19 2022    mild s/s   • History of prior pregnancies     A1   • Irritable bowel syndrome    • Lung disease    • Papanicolaou smear 2010    NEG   • Plantar fasciitis    • PONV (postoperative nausea and vomiting)    • Reactive airway disease     Last assessed: 16      Past Surgical History:     Past Surgical History:   Procedure Laterality Date   • BREAST CYST ASPIRATION Right    • COLONOSCOPY     • CYSTECTOMY Left     benign cystectomy below breast   • DILATION AND CURETTAGE OF UTERUS     • FACIAL RECONSTRUCTION SURGERY     • FOOT SURGERY Right     endoscopic plantar fasciotomy   • GUM SURGERY  2019    with bone graft   • KNEE ARTHROSCOPY Left 1997    menisectomy   • KNEE ARTHROSCOPY Left 10/1997    ACL repair   • MAMMO (HISTORICAL) Bilateral 2010    benign   • MYRINGOTOMY W/ TUBES Right 2019    Procedure: MYRINGOTOMY W/ INSERTION VENTILATION TUBE EAR;  Surgeon: Yonatan Beverly DO;  Location: [facility] MAIN [facility];  Service: ENT   • AK ARTHRP KNE CONDYLE&PLATU MEDIAL&LAT COMPARTMENTS Left 2022    Procedure: ARTHROPLASTY KNEE TOTAL W ROBOT with "screw removal left proximal tibia; Surgeon: Deep Levin MD;  Location: [facility] MA[facility];  Service: Orthopedics   • IN CORRECTION HAMMERTOE Right 02/04/2022    Procedure: REPAIR HAMMERTOE 2nd;  Surgeon: Bryan Duane, DPM;  Location: [address] [place] [address] MAIN OR;  Service: Podiatry   • IN SURGICAL ARTHROSCOPY SHOULDER W/ROTATOR CUFF RPR Left 01/31/2018    Procedure: SHOULDER ARTHROSCOPY ROTATOR CUFF REPAIR; SUBACROMIAL DECOMPRESSION;  Surgeon: Charlene Rose MD;  Location: [facility] MAIN OR;  Service: Orthopedics   • IN TMPP MASTOIDECT NTC/RCNSTED WALL W/O OCR Right 03/01/2019    Procedure: TYMPANOPLASTY;  Surgeon: Josie Cole DO;  Location: [facility];  Service: ENT   • SHOULDER SURGERY     • TONSILLECTOMY AND ADENOIDECTOMY     • TUBAL LIGATION     • VAGINAL DELIVERY  1987   • WISDOM TOOTH EXTRACTION        Social History:     Social History     Socioeconomic History   • Marital status: /Civil Union     Spouse name: None   • Number of children: 1   • Years of education: None   • Highest education level: None   Occupational History   • None   Tobacco Use   • Smoking status: Every Day     Packs/day: 0 75     Years: 40 00     Pack years: 30 00     Types: Cigarettes     Start date: 1/1/1980   • Smokeless tobacco: Never   Vaping Use   • Vaping Use: Never used   Substance and Sexual Activity   • Alcohol use: Not Currently     Comment: Quit 23 years ago   Recovering alcoholic   • Drug use: Not Currently     Types: Cocaine, \"Crack\" cocaine     Comment: clean since 1995   • Sexual activity: Yes     Partners: Male     Birth control/protection: None   Other Topics Concern   • None   Social History Narrative    Caffeine use    Daily coffee consumption     Social Determinants of Health     Financial Resource Strain: Not on file   Food Insecurity: Not on file   Transportation Needs: Not on file   Physical Activity: Not on file   Stress: Not on file   Social Connections: Not on file   Intimate Partner Violence: Not on file   Housing " Stability: Not on file      Family History:     Family History   Problem Relation Age of Onset   • Mental illness Mother    • Substance Abuse Mother    • Lung cancer Mother    • Cancer Mother            • Hyperlipidemia Mother    • Hypertension Father         Benign Essential   • Cataracts Father    • Diabetes Father            • Mental illness Father    • Hyperlipidemia Father    • Depression Father    • Cancer Father            • Schizophrenia Sister    • Squamous cell carcinoma Sister    • Diabetes Sister    • Hypertension Sister    • Mental illness Sister    • Cancer Sister    • Substance Abuse Sister    • Cancer Maternal Grandmother            • Diabetes Maternal Grandmother    • Heart failure Maternal Grandfather    • Lung cancer Paternal Grandmother    • Hyperlipidemia Paternal Grandmother    • Cancer Paternal Grandmother            • Hyperlipidemia Paternal Grandfather    • No Known Problems Son    • Pulmonary fibrosis Paternal Aunt    • Schizophrenia Paternal Aunt    • Diabetes Paternal Aunt    • Mental illness Paternal Aunt    • Diabetes Brother    • Hypertension Brother    • Hyperlipidemia Brother    • Diabetes Brother    • Hypertension Brother    • Diabetes Brother         possibly one or both   • Mental illness Paternal Aunt       Current Medications:     Current Outpatient Medications   Medication Sig Dispense Refill   • albuterol (PROVENTIL HFA,VENTOLIN HFA) 90 mcg/act inhaler Inhale 2 puffs every 4 (four) hours as needed for wheezing 1 g 5   • Calcium Carbonate-Vit D-Min (Caltrate 600+D Plus Minerals) 600-800 MG-UNIT CHEW Chew 1 tablet daily     • dextromethorphan-guaifenesin (MUCINEX DM)  MG per 12 hr tablet in the morning     • ferrous sulfate 324 (65 Fe) mg Take one tablet daily   30 tablet 0   • ipratropium (ATROVENT) 0 02 % nebulizer solution Take 0 5 mg by nebulization 2 (two) times a day     • Loperamide HCl (IMODIUM PO) Take 2 mg by mouth in the "morning       • LORazepam (ATIVAN) 0 5 mg tablet Take 1 tablet (0 5 mg total) by mouth daily at bedtime as needed for anxiety 30 tablet 0   • MELATONIN PO Take 10 mg by mouth daily at bedtime     • Multiple Minerals-Vitamins (ROSLYN-MAG-ZINC-D PO) Take 1 tablet by mouth daily     • Multiple Vitamins-Minerals (multivitamin with minerals) tablet Take 1 tablet by mouth daily     • pregabalin (LYRICA) 50 mg capsule Take 1 capsule (50 mg total) by mouth daily at bedtime 30 capsule 0   • zolpidem (AMBIEN) 10 mg tablet Take 1 tablet (10 mg total) by mouth daily at bedtime as needed for sleep 30 tablet 0   • celecoxib (CeleBREX) 200 mg capsule Take 1 capsule (200 mg total) by mouth 2 (two) times a day 180 capsule 0   • ergocalciferol (VITAMIN D2) 50,000 units Take 1 capsule (50,000 Units total) by mouth once a week for 8 days 8 capsule 0   • Eszopiclone (LUNESTA PO) Take 1 tablet by mouth in the morning (Patient not taking: Reported on 2/20/2023)     • levocetirizine (XYZAL) 5 MG tablet Take 5 mg by mouth daily       No current facility-administered medications for this visit  Allergies: Allergies   Allergen Reactions   • Percocet [Oxycodone-Acetaminophen] GI Intolerance   • Metronidazole GI Intolerance      Physical Exam:     /58 (BP Location: Left arm, Patient Position: Sitting, Cuff Size: Adult)   Pulse 68   Temp 97 5 °F (36 4 °C) (Probe)   Ht 5' 2 5\" (1 588 m)   Wt 56 8 kg (125 lb 3 2 oz)   LMP 03/01/2019 (Within Weeks)   SpO2 99%   BMI 22 53 kg/m²     Physical Exam  Constitutional:       Appearance: She is well-developed  HENT:      Head: Normocephalic and atraumatic  Right Ear: External ear normal       Left Ear: External ear normal       Nose: Nose normal    Eyes:      Pupils: Pupils are equal, round, and reactive to light  Cardiovascular:      Rate and Rhythm: Normal rate and regular rhythm  Heart sounds: Normal heart sounds  No murmur heard    Pulmonary:      Effort: Pulmonary effort " is normal       Breath sounds: Normal breath sounds  Abdominal:      General: There is no distension  Palpations: Abdomen is soft  Tenderness: There is no abdominal tenderness  There is no guarding            Dora Fleming DO  [facility] [address]

## 2023-04-23 PROBLEM — Z13.820 SCREENING FOR OSTEOPOROSIS: Status: ACTIVE | Noted: 2023-04-23

## 2023-04-23 PROBLEM — R89.9 ABNORMAL LABORATORY TEST: Status: ACTIVE | Noted: 2023-04-23

## 2023-04-23 PROBLEM — R13.14 PHARYNGOESOPHAGEAL DYSPHAGIA: Status: ACTIVE | Noted: 2023-04-23

## 2023-04-23 PROBLEM — Z72.0 TOBACCO ABUSE: Status: ACTIVE | Noted: 2023-04-23

## 2023-04-23 PROBLEM — Z00.00 WELLNESS EXAMINATION: Status: ACTIVE | Noted: 2023-04-23

## 2023-04-23 PROBLEM — F41.9 ANXIETY: Status: ACTIVE | Noted: 2023-04-23

## 2023-04-23 PROBLEM — G47.00 INSOMNIA: Status: ACTIVE | Noted: 2023-04-23

## 2023-04-23 NOTE — ASSESSMENT & PLAN NOTE
Assessment and plan 1  Health maintenance annual wellness examination overall the patient is clinically stable and doing well, we encouraged the patient to follow a healthy and balanced diet  We recommend that the patient exercise routinely approximately 30 minutes 5 times per week   We have reviewed the patient's vaccines and have made recommendations for updates if necessary   annual flu shot in the fall recommended     We will be ordering screening laboratories which are age appropriate  Return to the office in   6 months   call if any problems

## 2023-04-23 NOTE — ASSESSMENT & PLAN NOTE
Patient did request refills of Lyrica which is helpful and she tolerates PDMP checked Rx for Lyrica refilled

## 2023-04-23 NOTE — PROGRESS NOTES
Assessment/Plan:    Wellness examination  Assessment and plan 1  Health maintenance annual wellness examination overall the patient is clinically stable and doing well, we encouraged the patient to follow a healthy and balanced diet  We recommend that the patient exercise routinely approximately 30 minutes 5 times per week   We have reviewed the patient's vaccines and have made recommendations for updates if necessary   annual flu shot in the fall recommended     We will be ordering screening laboratories which are age appropriate  Return to the office in   6 months   call if any problems  COPD (chronic obstructive pulmonary disease) ([facility])  Clinically stable and doing well encourage patient to quit smoking    Restless leg syndrome  Patient did request refills of Lyrica which is helpful and she tolerates PDMP checked Rx for Lyrica refilled    Anxiety  Currently stable patient did request refill for Ativan 0 5 mg 1 tablet once a day at nighttime as needed no alcohol no driving not to be taking when taking Ambien PDMP checked medication refilled    Abnormal laboratory test  Low anion gap check SPEP/UPEP    Pharyngoesophageal dysphagia  We will obtain GI consultation for EGD and possible dilatation    Tobacco abuse  I have counseled the patient to quit smoking, I have recommended that the patient set up a quit date and I have asked the patient to cut down the cigarettes until the quit date           Problem List Items Addressed This Visit        Digestive    Pharyngoesophageal dysphagia     We will obtain GI consultation for EGD and possible dilatation         Relevant Orders    Ambulatory Referral to Gastroenterology       Respiratory    COPD (chronic obstructive pulmonary disease) ([facility] [facility] ) - Primary     Clinically stable and doing well encourage patient to quit smoking         Relevant Medications    levocetirizine (XYZAL) 5 MG tablet    albuterol (PROVENTIL HFA,VENTOLIN HFA) 90 mcg/act inhaler       Other Restless leg syndrome     Patient did request refills of Lyrica which is helpful and she tolerates PDMP checked Rx for Lyrica refilled         Relevant Medications    pregabalin (LYRICA) 50 mg capsule    Encounter for immunization    Wellness examination     Assessment and plan 1  Health maintenance annual wellness examination overall the patient is clinically stable and doing well, we encouraged the patient to follow a healthy and balanced diet  We recommend that the patient exercise routinely approximately 30 minutes 5 times per week   We have reviewed the patient's vaccines and have made recommendations for updates if necessary   annual flu shot in the fall recommended     We will be ordering screening laboratories which are age appropriate  Return to the office in   6 months   call if any problems  Insomnia    Relevant Medications    zolpidem (AMBIEN) 10 mg tablet    Screening for osteoporosis    Relevant Medications    Calcium Carbonate-Vit D-Min (Caltrate 600+D Plus Minerals) 600-800 MG-UNIT CHEW    Other Relevant Orders    DXA bone density spine hip and pelvis    Abnormal laboratory test     Low anion gap check SPEP/UPEP         Relevant Orders    Protein electrophoresis, serum    Protein electrophoresis, urine    Anxiety     Currently stable patient did request refill for Ativan 0 5 mg 1 tablet once a day at nighttime as needed no alcohol no driving not to be taking when taking Ambien PDMP checked medication refilled         Relevant Medications    LORazepam (ATIVAN) 0 5 mg tablet    Tobacco abuse     I have counseled the patient to quit smoking, I have recommended that the patient set up a quit date and I have asked the patient to cut down the cigarettes until the quit date          Other Visit Diagnoses     Encounter for screening mammogram for breast cancer        Relevant Orders    Mammo screening bilateral w 3d & cad    Screening for cardiovascular condition        Relevant Orders    Lipid Panel with Direct LDL reflex          Return to office 6  months  call if any problems  Subjective:      Patient ID: Isabel Varner is a 62 y o  female  HPI 58-year old female coming in for a follow up office visit regarding anxiety, insomnia, restless leg syndrome, COPD, tobacco abuse, dysphagia and abnormal laboratory; the patient reports me compliant taking medications without untoward side effects the  The patient is here to review his medical condition, update me on the medical condition and the patient reports me no hospitalizations and no ER visits  No injuries no illnesses she is taking her usual medications she is requesting refills for Ativan, Lyrica she reports today her anxiety is stable she tolerates Ativan and Lyrica without any side effects they are helpful to treat her anxiety and restless leg syndrome  She is trying to follow a healthy and balanced diet and remains active  Here to review laboratories in detail    The following portions of the patient's history were reviewed and updated as appropriate: allergies, current medications, past family history, past medical history, past social history, past surgical history and problem list     Review of Systems   Constitutional: Negative for activity change, appetite change and unexpected weight change  HENT: Negative for congestion and postnasal drip  Eyes: Negative for visual disturbance  Respiratory: Negative for cough and shortness of breath  Cardiovascular: Negative for chest pain  Gastrointestinal: Negative for abdominal pain, diarrhea, nausea and vomiting  Dysphagia   Neurological: Negative for dizziness, light-headedness and headaches  Objective:    Return in about 6 months (around 10/19/2023)  No results found        Allergies   Allergen Reactions   • Percocet [Oxycodone-Acetaminophen] GI Intolerance   • Metronidazole GI Intolerance       Past Medical History:   Diagnosis Date   • [facility] ([facility] ) 8--/18    recovering alcoholic, drug addict/compulsive over-eater   • Anemia    • Anxiety    • Arthritis    • Callus    • Complete Tear of the Anterior cruciate ligament tear of the knee    • COPD (chronic obstructive pulmonary disease) (HCC)    • Depression    • Difficulty walking    • Exposure to hepatitis B     Resolved: 17   • History of cholesteatoma     right ear   • History of COVID-19 2022    mild s/s   • History of prior pregnancies     A1   • Irritable bowel syndrome    • Lung disease    • Papanicolaou smear 2010    NEG   • Plantar fasciitis    • PONV (postoperative nausea and vomiting)    • Reactive airway disease     Last assessed: 16     Past Surgical History:   Procedure Laterality Date   • BREAST CYST ASPIRATION Right    • COLONOSCOPY     • CYSTECTOMY Left     benign cystectomy below breast   • DILATION AND CURETTAGE OF UTERUS     • FACIAL RECONSTRUCTION SURGERY     • FOOT SURGERY Right     endoscopic plantar fasciotomy   • GUM SURGERY  2019    with bone graft   • KNEE ARTHROSCOPY Left 1997    menisectomy   • KNEE ARTHROSCOPY Left 10/1997    ACL repair   • MAMMO (HISTORICAL) Bilateral 2010    benign   • MYRINGOTOMY W/ TUBES Right 2019    Procedure: MYRINGOTOMY W/ INSERTION VENTILATION TUBE EAR;  Surgeon: Louie Gonzalez DO;  [facility];  Service: ENT   • VT ARTHRP KNE CONDYLE&PLATU MEDIAL&LAT COMPARTMENTS Left 2022    Procedure: ARTHROPLASTY KNEE TOTAL W ROBOT with screw removal left proximal tibia;   Surgeon: Treasure Zazueta MD;  [facility] MA[facility];  Service: Orthopedics   • VT CORRECTION HAMMERTOE Right 2022    Procedure: REPAIR HAMMERTOE 2nd;  Surgeon: Sondra Perez DPM;  Location: [address] [place] [address] MAIN OR;  Service: Podiatry   • VT SURGICAL ARTHROSCOPY SHOULDER W/ROTATOR CUFF RPR Left 2018    Procedure: SHOULDER ARTHROSCOPY ROTATOR CUFF REPAIR; SUBACROMIAL DECOMPRESSION;  Surgeon: Savannah Osullivan MD;  Location: [facility] MAIN OR;  Service: Orthopedics   • DE TMPP MASTOIDECT NTC/RCNSTED WALL W/O OCR Right 2019    Procedure: TYMPANOPLASTY;  Surgeon: Delmy Fischer DO;  [facility] MA[facility];  Service: ENT   • SHOULDER SURGERY     • TONSILLECTOMY AND ADENOIDECTOMY     • TUBAL LIGATION     • VAGINAL DELIVERY     • WISDOM TOOTH EXTRACTION       Current Outpatient Medications on File Prior to Visit   Medication Sig Dispense Refill   • dextromethorphan-guaifenesin (MUCINEX DM)  MG per 12 hr tablet in the morning     • ferrous sulfate 324 (65 Fe) mg Take one tablet daily  30 tablet 0   • ipratropium (ATROVENT) 0 02 % nebulizer solution Take 0 5 mg by nebulization 2 (two) times a day     • Loperamide HCl (IMODIUM PO) Take 2 mg by mouth in the morning       • MELATONIN PO Take 10 mg by mouth daily at bedtime     • Multiple Minerals-Vitamins (ROSLYN-MAG-ZINC-D PO) Take 1 tablet by mouth daily     • Multiple Vitamins-Minerals (multivitamin with minerals) tablet Take 1 tablet by mouth daily     • celecoxib (CeleBREX) 200 mg capsule Take 1 capsule (200 mg total) by mouth 2 (two) times a day 180 capsule 0   • ergocalciferol (VITAMIN D2) 50,000 units Take 1 capsule (50,000 Units total) by mouth once a week for 8 days 8 capsule 0   • Eszopiclone (LUNESTA PO) Take 1 tablet by mouth in the morning (Patient not taking: Reported on 2023)     • levocetirizine (XYZAL) 5 MG tablet Take 5 mg by mouth daily     • [DISCONTINUED] acetaminophen (TYLENOL) 500 mg tablet Take 2 tablets (1,000 mg total) by mouth daily at bedtime 20 tablet 0     No current facility-administered medications on file prior to visit       Family History   Problem Relation Age of Onset   • Mental illness Mother    • Substance Abuse Mother    • Lung cancer Mother    • Cancer Mother            • Hyperlipidemia Mother    • Hypertension Father         Benign Essential   • Cataracts Father    • Diabetes Father            • Mental illness Father    • "Hyperlipidemia Father    • Depression Father    • Cancer Father            • Schizophrenia Sister    • Squamous cell carcinoma Sister    • Diabetes Sister    • Hypertension Sister    • Mental illness Sister    • Cancer Sister    • Substance Abuse Sister    • Cancer Maternal Grandmother            • Diabetes Maternal Grandmother    • Heart failure Maternal Grandfather    • Lung cancer Paternal Grandmother    • Hyperlipidemia Paternal Grandmother    • Cancer Paternal Grandmother            • Hyperlipidemia Paternal Grandfather    • No Known Problems Son    • Pulmonary fibrosis Paternal Aunt    • Schizophrenia Paternal Aunt    • Diabetes Paternal Aunt    • Mental illness Paternal Aunt    • Diabetes Brother    • Hypertension Brother    • Hyperlipidemia Brother    • Diabetes Brother    • Hypertension Brother    • Diabetes Brother         possibly one or both   • Mental illness Paternal Aunt      Social History     Socioeconomic History   • Marital status: /Civil Union     Spouse name: Not on file   • Number of children: 1   • Years of education: Not on file   • Highest education level: Not on file   Occupational History   • Not on file   Tobacco Use   • Smoking status: Every Day     Packs/day: 0 75     Years: 40 00     Pack years: 30 00     Types: Cigarettes     Start date: 1980   • Smokeless tobacco: Never   Vaping Use   • Vaping Use: Never used   Substance and Sexual Activity   • Alcohol use: Not Currently     Comment: Quit 23 years ago   Recovering alcoholic   • Drug use: Not Currently     Types: Cocaine, \"Crack\" cocaine     Comment: clean since    • Sexual activity: Yes     Partners: Male     Birth control/protection: None   Other Topics Concern   • Not on file   Social History Narrative    Caffeine use    Daily coffee consumption     Social Determinants of Health     Financial Resource Strain: Not on file   Food Insecurity: Not on file   Transportation Needs: Not on file " "  Physical Activity: Not on file   Stress: Not on file   Social Connections: Not on file   Intimate Partner Violence: Not on file   Housing Stability: Not on file     Vitals:    04/19/23 1602   BP: 110/58   BP Location: Left arm   Patient Position: Sitting   Cuff Size: Adult   Pulse: 68   Temp: 97 5 °F (36 4 °C)   TempSrc: Probe   SpO2: 99%   Weight: 56 8 kg (125 lb 3 2 oz)   Height: 5' 2 5\" (1 588 m)     Results for orders placed or performed in visit on 04/17/23   Vitamin D 25 hydroxy   Result Value Ref Range    Vit D, 25-Hydroxy 57 8 30 0 - 100 0 ng/mL     Weight (last 2 days)     None        Body mass index is 22 53 kg/m²  BP      Temp      Pulse     Resp      SpO2        Vitals:    04/19/23 1602   Weight: 56 8 kg (125 lb 3 2 oz)     Vitals:    04/19/23 1602   Weight: 56 8 kg (125 lb 3 2 oz)       /58 (BP Location: Left arm, Patient Position: Sitting, Cuff Size: Adult)   Pulse 68   Temp 97 5 °F (36 4 °C) (Probe)   Ht 5' 2 5\" (1 588 m)   Wt 56 8 kg (125 lb 3 2 oz)   LMP 03/01/2019 (Within Weeks)   SpO2 99%   BMI 22 53 kg/m²          Physical Exam  Vitals and nursing note reviewed  Constitutional:       General: She is not in acute distress  Appearance: She is well-developed  She is not ill-appearing, toxic-appearing or diaphoretic  HENT:      Head: Normocephalic  Eyes:      General: No scleral icterus  Right eye: No discharge  Left eye: No discharge  Conjunctiva/sclera: Conjunctivae normal       Pupils: Pupils are equal, round, and reactive to light  Cardiovascular:      Rate and Rhythm: Normal rate and regular rhythm  Heart sounds: Normal heart sounds  No murmur heard  No friction rub  No gallop  Pulmonary:      Effort: No respiratory distress  Breath sounds: Normal breath sounds  No wheezing or rales  Abdominal:      General: Bowel sounds are normal  There is no distension  Palpations: Abdomen is soft  There is no mass  Tenderness:  There " is no abdominal tenderness  There is no guarding or rebound  Musculoskeletal:         General: No deformity  Cervical back: Neck supple  Lymphadenopathy:      Cervical: No cervical adenopathy  Neurological:      Mental Status: She is alert  Coordination: Coordination normal    Psychiatric:         Mood and Affect: Mood is not anxious or depressed  Thought Content: Thought content does not include suicidal ideation

## 2023-04-23 NOTE — ASSESSMENT & PLAN NOTE
Currently stable patient did request refill for Ativan 0 5 mg 1 tablet once a day at nighttime as needed no alcohol no driving not to be taking when taking Ambien PDMP checked medication refilled

## 2023-04-24 ENCOUNTER — PREP FOR PROCEDURE (OUTPATIENT)
Dept: GASTROENTEROLOGY | Facility: CLINIC | Age: 58
End: 2023-04-24

## 2023-04-24 ENCOUNTER — CONSULT (OUTPATIENT)
Dept: GASTROENTEROLOGY | Facility: CLINIC | Age: 58
End: 2023-04-24

## 2023-04-24 VITALS
SYSTOLIC BLOOD PRESSURE: 114 MMHG | HEIGHT: 63 IN | BODY MASS INDEX: 22.68 KG/M2 | TEMPERATURE: 98.6 F | HEART RATE: 67 BPM | WEIGHT: 128 LBS | DIASTOLIC BLOOD PRESSURE: 71 MMHG

## 2023-04-24 DIAGNOSIS — R13.14 PHARYNGOESOPHAGEAL DYSPHAGIA: Primary | ICD-10-CM

## 2023-04-24 DIAGNOSIS — F17.210 SMOKING GREATER THAN 20 PACK YEARS: ICD-10-CM

## 2023-04-24 DIAGNOSIS — K57.90 DIVERTICULOSIS: ICD-10-CM

## 2023-04-24 PROBLEM — R89.9 ABNORMAL LABORATORY TEST: Status: RESOLVED | Noted: 2023-04-23 | Resolved: 2023-04-24

## 2023-04-24 NOTE — PROGRESS NOTES
[facility] - Outpatient Consultation  Corinne Speed 62 y o  female MRN: [id number]  Encounter: [id number]          ASSESSMENT AND PLAN:  62year old female referred her    1  Dysphagia  -will do EGD to assess for ring, stricture, other    2  Irritable bowel syndrome with diarrhea  -imodium as needed    3  Diverticulosis  -high fiber diet    4  Colon cancer screening  -up to date last colonoscopy in 2020      4  Smoking greater than 20 pack years      ______________________________________________________________________    HPI:  62year old female referred by PCP  Reports her PCP referred her due to some dysphagia which happens with eating edamame  She reoeorts having an ankle sprain and reports one time was eating edamame and was choking and couldn't breathe- she had to have someone due to [employer]  This happened a few years ago and hasn't recurred  Also reports peanut butter or drier meats at times cause similar symptoms  Denies any nausea, vomiting, weight loss  Reports last colonoscopy in 2020 with only diverticulosis found  Sister had a mass on her jaw treated with radiation  Denies family history of colon cancer  Gets her mammograms regularly and getting checked regularly  REVIEW OF SYSTEMS:    CONSTITUTIONAL: Denies any fever, chills, rigors, and weight loss  HEENT: No earache or tinnitus  Denies hearing loss or visual disturbances  CARDIOVASCULAR: No chest pain or palpitations  RESPIRATORY: Denies any cough, hemoptysis, shortness of breath or dyspnea on exertion  GASTROINTESTINAL: As noted in the History of Present Illness  GENITOURINARY: No problems with urination  Denies any hematuria or dysuria  NEUROLOGIC: No dizziness or vertigo, denies headaches  MUSCULOSKELETAL: Denies any muscle or joint pain  SKIN: Denies skin rashes or itching  ENDOCRINE: Denies excessive thirst  Denies intolerance to heat or cold    PSYCHOSOCIAL: Denies depression or anxiety  Denies any recent memory loss  Historical Information   Past Medical History:   Diagnosis Date   • [facility] ) 95/18    recovering alcoholic, drug addict/compulsive over-eater   • Anemia    • Anxiety    • Arthritis    • Callus    • Complete Tear of the Anterior cruciate ligament tear of the knee    • COPD (chronic obstructive pulmonary disease) ([facility] [address] )    • Depression    • Difficulty walking    • Exposure to hepatitis B     Resolved: 17   • History of cholesteatoma     right ear   • History of COVID-19 2022    mild s/s   • History of prior pregnancies     A1   • Irritable bowel syndrome    • Lung disease    • Papanicolaou smear 2010    NEG   • Plantar fasciitis    • PONV (postoperative nausea and vomiting)    • Reactive airway disease     Last assessed: 16     Past Surgical History:   Procedure Laterality Date   • BREAST CYST ASPIRATION Right    • COLONOSCOPY     • CYSTECTOMY Left     benign cystectomy below breast   • DILATION AND CURETTAGE OF UTERUS     • FACIAL RECONSTRUCTION SURGERY     • FOOT SURGERY Right     endoscopic plantar fasciotomy   • GUM SURGERY  2019    with bone graft   • KNEE ARTHROSCOPY Left 1997    menisectomy   • KNEE ARTHROSCOPY Left 10/1997    ACL repair   • MAMMO (HISTORICAL) Bilateral 2010    benign   • MYRINGOTOMY W/ TUBES Right 2019    Procedure: MYRINGOTOMY W/ INSERTION VENTILATION TUBE EAR;  Surgeon: Colette Lema DO;  [facility];  Service: ENT   • ND ARTHRP KNE CONDYLE&PLATU MEDIAL&LAT COMPARTMENTS Left 2022    Procedure: ARTHROPLASTY KNEE TOTAL W ROBOT with screw removal left proximal tibia;   Surgeon: Tera Boyer MD;  Location[facility] MA[facility];  Service: Orthopedics   • ND CORRECTION HAMMERTOE Right 2022    Procedure: Turner Ca 2nd;  Surgeon: Elli Kraft DPM;  Location: [facility] MAIN OR;  Service: Podiatry   • ND SURGICAL ARTHROSCOPY SHOULDER W/ROTATOR CUFF "RPR Left 2018    Procedure: SHOULDER ARTHROSCOPY ROTATOR CUFF REPAIR; SUBACROMIAL DECOMPRESSION;  Surgeon: Kurt Bernard MD;  [facility];  Service: Orthopedics   • AK TMPP MASTOIDECT NTC/RCNSTED WALL W/O OCR Right 2019    Procedure: TYMPANOPLASTY;  Surgeon: Carter Coppola DO;  [facility] MA[facility];  Service: ENT   • SHOULDER SURGERY     • TONSILLECTOMY AND ADENOIDECTOMY     • TUBAL LIGATION     • VAGINAL DELIVERY     • WISDOM TOOTH EXTRACTION       Social History   Social History     Substance and Sexual Activity   Alcohol Use Not Currently    Comment: Quit 23 years ago   Recovering alcoholic     Social History     Substance and Sexual Activity   Drug Use Not Currently   • Types: Cocaine, \"Crack\" cocaine    Comment: clean since      Social History     Tobacco Use   Smoking Status Every Day   • Packs/day: 0 75   • Years: 40 00   • Pack years: 30 00   • Types: Cigarettes   • Start date: 1980   Smokeless Tobacco Never     Family History   Problem Relation Age of Onset   • Mental illness Mother    • Substance Abuse Mother    • Lung cancer Mother    • Cancer Mother            • Hyperlipidemia Mother    • Hypertension Father         Benign Essential   • Cataracts Father    • Diabetes Father            • Mental illness Father    • Hyperlipidemia Father    • Depression Father    • Cancer Father            • Schizophrenia Sister    • Squamous cell carcinoma Sister    • Diabetes Sister    • Hypertension Sister    • Mental illness Sister    • Cancer Sister    • Substance Abuse Sister    • Cancer Maternal Grandmother            • Diabetes Maternal Grandmother    • Heart failure Maternal Grandfather    • Lung cancer Paternal Grandmother    • Hyperlipidemia Paternal Grandmother    • Cancer Paternal Grandmother            • Hyperlipidemia Paternal Grandfather    • No Known Problems Son    • Pulmonary fibrosis Paternal Aunt    • Schizophrenia Paternal " "Aunt    • Diabetes Paternal Aunt    • Mental illness Paternal Aunt    • Diabetes Brother    • Hypertension Brother    • Hyperlipidemia Brother    • Diabetes Brother    • Hypertension Brother    • Diabetes Brother         possibly one or both   • Mental illness Paternal Aunt        Meds/Allergies       Current Outpatient Medications:   •  albuterol (PROVENTIL HFA,VENTOLIN HFA) 90 mcg/act inhaler  •  Calcium Carbonate-Vit D-Min (Caltrate 600+D Plus Minerals) 600-800 MG-UNIT CHEW  •  dextromethorphan-guaifenesin (MUCINEX DM)  MG per 12 hr tablet  •  ferrous sulfate 324 (65 Fe) mg  •  ipratropium (ATROVENT) 0 02 % nebulizer solution  •  levocetirizine (XYZAL) 5 MG tablet  •  Loperamide HCl (IMODIUM PO)  •  LORazepam (ATIVAN) 0 5 mg tablet  •  MELATONIN PO  •  Multiple Minerals-Vitamins (ROSLYN-MAG-ZINC-D PO)  •  Multiple Vitamins-Minerals (multivitamin with minerals) tablet  •  pregabalin (LYRICA) 50 mg capsule  •  zolpidem (AMBIEN) 10 mg tablet  •  celecoxib (CeleBREX) 200 mg capsule  •  ergocalciferol (VITAMIN D2) 50,000 units  •  Eszopiclone (LUNESTA PO)    Allergies   Allergen Reactions   • Percocet [Oxycodone-Acetaminophen] GI Intolerance   • Metronidazole GI Intolerance           Objective     Blood pressure 114/71, pulse 67, temperature 98 6 °F (37 °C), temperature source Tympanic, height 5' 2 5\" (1 588 m), weight 58 1 kg (128 lb), last menstrual period 03/01/2019, not currently breastfeeding  Body mass index is 23 04 kg/m²  PHYSICAL EXAM:      General Appearance:   Alert, cooperative, no distress   HEENT:   Normocephalic, atraumatic, anicteric      Neck:  Supple, symmetrical, trachea midline   Lungs:   Clear to auscultation bilaterally; no rales, rhonchi or wheezing; respirations unlabored    Heart[de-identified]   Regular rate and rhythm; no murmur, rub, or gallop     Abdomen:   Soft, non-tender, non-distended; normal bowel sounds; no masses, no organomegaly    Genitalia:   Deferred    Rectal:   Deferred  " Extremities:  No cyanosis, clubbing or edema    Pulses:  2+ and symmetric    Skin:  No jaundice, rashes, or lesions    Lymph nodes:  No palpable cervical lymphadenopathy        Lab Results:   No visits with results within 1 Day(s) from this visit  Latest known visit with results is:   Lab on 04/17/2023   Component Date Value   • Vit D, 25-Hydroxy 04/17/2023 57 8          Radiology Results:   No results found

## 2023-04-24 NOTE — PATIENT INSTRUCTIONS
Scheduled date of EGD(as of today):  6/15/23  Physician performing EGD: Dr Obrien Credit  Location of EGD: [facility]  Instructions reviewed with patient by: Linda Merchant   Clearances:   n/a

## 2023-05-01 ENCOUNTER — LAB (OUTPATIENT)
Dept: LAB | Age: 58
End: 2023-05-01

## 2023-05-01 DIAGNOSIS — R89.9 ABNORMAL LABORATORY TEST: ICD-10-CM

## 2023-05-01 DIAGNOSIS — Z13.6 SCREENING FOR CARDIOVASCULAR CONDITION: ICD-10-CM

## 2023-05-01 LAB
CHOLEST SERPL-MCNC: 163 MG/DL
HDLC SERPL-MCNC: 85 MG/DL
LDLC SERPL CALC-MCNC: 71 MG/DL (ref 0–100)
TRIGL SERPL-MCNC: 34 MG/DL

## 2023-05-02 LAB
ALBUMIN SERPL ELPH-MCNC: 4.25 G/DL (ref 3.5–5)
ALBUMIN SERPL ELPH-MCNC: 63.4 % (ref 52–65)
ALPHA1 GLOB SERPL ELPH-MCNC: 0.29 G/DL (ref 0.1–0.4)
ALPHA1 GLOB SERPL ELPH-MCNC: 4.4 % (ref 2.5–5)
ALPHA2 GLOB SERPL ELPH-MCNC: 0.66 G/DL (ref 0.4–1.2)
ALPHA2 GLOB SERPL ELPH-MCNC: 9.9 % (ref 7–13)
BETA GLOB ABNORMAL SERPL ELPH-MCNC: 0.41 G/DL (ref 0.4–0.8)
BETA1 GLOB SERPL ELPH-MCNC: 6.1 % (ref 5–13)
BETA2 GLOB SERPL ELPH-MCNC: 3.9 % (ref 2–8)
BETA2+GAMMA GLOB SERPL ELPH-MCNC: 0.26 G/DL (ref 0.2–0.5)
GAMMA GLOB ABNORMAL SERPL ELPH-MCNC: 0.82 G/DL (ref 0.5–1.6)
GAMMA GLOB SERPL ELPH-MCNC: 12.3 % (ref 12–22)
IGG/ALB SER: 1.73 {RATIO} (ref 1.1–1.8)
PROT PATTERN SERPL ELPH-IMP: NORMAL
PROT SERPL-MCNC: 6.7 G/DL (ref 6.4–8.2)

## 2023-05-16 ENCOUNTER — TELEPHONE (OUTPATIENT)
Dept: OTHER | Facility: OTHER | Age: 58
End: 2023-05-16

## 2023-05-16 NOTE — TELEPHONE ENCOUNTER
Pt  Called and is having back spasms and would like an appt  With Dr Keri Mendoza  Pt  Is requesting a call back

## 2023-05-17 ENCOUNTER — OFFICE VISIT (OUTPATIENT)
Dept: INTERNAL MEDICINE CLINIC | Facility: CLINIC | Age: 58
End: 2023-05-17

## 2023-05-17 VITALS
BODY MASS INDEX: 22.86 KG/M2 | OXYGEN SATURATION: 98 % | HEIGHT: 63 IN | WEIGHT: 129 LBS | SYSTOLIC BLOOD PRESSURE: 120 MMHG | DIASTOLIC BLOOD PRESSURE: 70 MMHG | HEART RATE: 70 BPM | RESPIRATION RATE: 16 BRPM

## 2023-05-17 DIAGNOSIS — M62.830 SPASM OF MUSCLE OF LOWER BACK: Primary | ICD-10-CM

## 2023-05-17 RX ORDER — NAPROXEN 500 MG/1
500 TABLET ORAL 2 TIMES DAILY WITH MEALS
Qty: 14 TABLET | Refills: 0 | Status: SHIPPED | OUTPATIENT
Start: 2023-05-17

## 2023-05-17 RX ORDER — METHOCARBAMOL 500 MG/1
500 TABLET, FILM COATED ORAL 4 TIMES DAILY
Qty: 28 TABLET | Refills: 0 | Status: SHIPPED | OUTPATIENT
Start: 2023-05-17

## 2023-05-17 NOTE — PROGRESS NOTES
Name: Debbie Nelson      : 1965      MRN: [id number]  Encounter Provider: Emmie Briscoe MD  Encounter Date: 2023   Encounter department: [facility],[address]     1  Spasm of muscle of lower back  Assessment & Plan:  Pain likely related to quadratus lumborum strain   -Start naproxen 500 mg twice daily with meals for 7 days  -Patient has also been given a prescription for Robaxin to use as needed  -Continue daily stretching, heat and ice as needed    Orders:  -     methocarbamol (ROBAXIN) 500 mg tablet; Take 1 tablet (500 mg total) by mouth 4 (four) times a day  -     naproxen (Naprosyn) 500 mg tablet; Take 1 tablet (500 mg total) by mouth 2 (two) times a day with meals           Subjective      HPI   Patient presents complaining of acute onset right lower back pain  She believes the pain is muscular in nature  She denies any inciting event  She reports she woke up one morning with the pain  She has tried heat, ice, stretching, massage and ibuprofen for the pain  She denies any pain radiating down into her legs or paresthesias  ROS as per HPI    Current Outpatient Medications on File Prior to Visit   Medication Sig   • albuterol (PROVENTIL HFA,VENTOLIN HFA) 90 mcg/act inhaler Inhale 2 puffs every 4 (four) hours as needed for wheezing   • Calcium Carbonate-Vit D-Min (Caltrate 600+D Plus Minerals) 600-800 MG-UNIT CHEW Chew 1 tablet daily   • dextromethorphan-guaifenesin (MUCINEX DM)  MG per 12 hr tablet in the morning   • ferrous sulfate 324 (65 Fe) mg Take one tablet daily     • ipratropium (ATROVENT) 0 02 % nebulizer solution Take 0 5 mg by nebulization 2 (two) times a day   • levocetirizine (XYZAL) 5 MG tablet Take 1 tablet (5 mg total) by mouth daily   • Loperamide HCl (IMODIUM PO) Take 2 mg by mouth in the morning     • LORazepam (ATIVAN) 0 5 mg tablet Take 1 tablet (0 5 mg total) by mouth daily at bedtime as needed for anxiety   • MELATONIN "PO Take 10 mg by mouth daily at bedtime   • Multiple Minerals-Vitamins (ROSLYN-MAG-ZINC-D PO) Take 1 tablet by mouth daily   • Multiple Vitamins-Minerals (multivitamin with minerals) tablet Take 1 tablet by mouth daily   • pregabalin (LYRICA) 50 mg capsule Take 1 capsule (50 mg total) by mouth daily at bedtime   • zolpidem (AMBIEN) 10 mg tablet Take 1 tablet (10 mg total) by mouth daily at bedtime as needed for sleep       Objective     /70   Pulse 70   Resp 16   Ht 5' 2 5\" (1 588 m)   Wt 58 5 kg (129 lb)   LMP 03/01/2019 (Within Weeks)   SpO2 98%   BMI 23 22 kg/m²     BP Readings from Last 3 Encounters:   05/17/23 120/70   04/24/23 114/71   04/19/23 110/58        Wt Readings from Last 3 Encounters:   05/17/23 58 5 kg (129 lb)   04/24/23 58 1 kg (128 lb)   04/19/23 56 8 kg (125 lb 3 2 oz)       Physical Exam    General: NAD, Alert and oriented x3   HEENT: NCAT, EOMI, normal conjunctiva  Cardiovascular: RRR, normal S1 and S2, no m/r/g  Pulmonary: Normal respiratory effort, no wheezes, rales or rhonchi  MSK: Normal bulk and tone, 5 out of 5 strength in lower extremities  Neuro: Non-focal, ambulating without difficulty, non-antalgic gait, 2+ patellar DTRs  Extremities: No lower extremity edema  Skin: Normal skin color, no rashes     Jun Greenfield MD  "

## 2023-05-17 NOTE — ASSESSMENT & PLAN NOTE
Pain likely related to quadratus lumborum strain   -Start naproxen 500 mg twice daily with meals for 7 days  -Patient has also been given a prescription for Robaxin to use as needed  -Continue daily stretching, heat and ice as needed

## 2023-05-27 DIAGNOSIS — G25.81 RESTLESS LEG SYNDROME: ICD-10-CM

## 2023-06-01 DIAGNOSIS — Z47.1 AFTERCARE FOLLOWING LEFT KNEE JOINT REPLACEMENT SURGERY: Primary | ICD-10-CM

## 2023-06-01 DIAGNOSIS — Z96.652 AFTERCARE FOLLOWING LEFT KNEE JOINT REPLACEMENT SURGERY: Primary | ICD-10-CM

## 2023-06-01 RX ORDER — PREGABALIN 50 MG/1
50 CAPSULE ORAL
Qty: 30 CAPSULE | Refills: 0 | Status: SHIPPED | OUTPATIENT
Start: 2023-06-01

## 2023-06-05 ENCOUNTER — OFFICE VISIT (OUTPATIENT)
Dept: INTERNAL MEDICINE CLINIC | Facility: CLINIC | Age: 58
End: 2023-06-05
Payer: COMMERCIAL

## 2023-06-05 ENCOUNTER — TELEPHONE (OUTPATIENT)
Dept: INTERNAL MEDICINE CLINIC | Facility: CLINIC | Age: 58
End: 2023-06-05

## 2023-06-05 VITALS
DIASTOLIC BLOOD PRESSURE: 64 MMHG | HEIGHT: 63 IN | RESPIRATION RATE: 16 BRPM | SYSTOLIC BLOOD PRESSURE: 118 MMHG | HEART RATE: 63 BPM | BODY MASS INDEX: 22.75 KG/M2 | OXYGEN SATURATION: 97 % | WEIGHT: 128.4 LBS

## 2023-06-05 DIAGNOSIS — G47.00 INSOMNIA, UNSPECIFIED TYPE: ICD-10-CM

## 2023-06-05 DIAGNOSIS — F41.9 ANXIETY: ICD-10-CM

## 2023-06-05 DIAGNOSIS — F41.9 ANXIETY DISORDER, UNSPECIFIED TYPE: ICD-10-CM

## 2023-06-05 DIAGNOSIS — F32.A DEPRESSION, UNSPECIFIED DEPRESSION TYPE: Primary | ICD-10-CM

## 2023-06-05 DIAGNOSIS — M79.7 FIBROMYALGIA: ICD-10-CM

## 2023-06-05 PROCEDURE — 99214 OFFICE O/P EST MOD 30 MIN: CPT | Performed by: INTERNAL MEDICINE

## 2023-06-05 RX ORDER — ESCITALOPRAM OXALATE 5 MG/1
5 TABLET ORAL DAILY
Qty: 30 TABLET | Refills: 2 | Status: SHIPPED | OUTPATIENT
Start: 2023-06-05
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
## 2023-06-06 RX ORDER — ZOLPIDEM TARTRATE 10 MG/1
TABLET ORAL
Qty: 30 TABLET | Refills: 0 | Status: SHIPPED | OUTPATIENT
Start: 2023-06-06

## 2023-06-06 RX ORDER — LORAZEPAM 0.5 MG/1
TABLET ORAL
Qty: 30 TABLET | Refills: 0 | Status: SHIPPED | OUTPATIENT
Start: 2023-06-06

## 2023-06-11 PROBLEM — M79.7 FIBROMYALGIA: Status: ACTIVE | Noted: 2023-06-11

## 2023-06-11 NOTE — ASSESSMENT & PLAN NOTE
Patient also reports many symptoms compatible with fibromyalgia we will be starting patient on Lexapro, I did explain to the patient if this medication is not effective or she does not tolerate it may consider low-dose of Cymbalta in place of Lexapro we will discuss this further in the future

## 2023-06-11 NOTE — ASSESSMENT & PLAN NOTE
Currently on Ambien 10 mg 1 p o  nightly as needed likely related to anxiety depression will be starting SSRI

## 2023-06-11 NOTE — ASSESSMENT & PLAN NOTE
Patient does report to me thoughts of death no SI we will start Lexapro 5 mg once daily reviewed the risk benefits and side effects of the medication we will have patient see counselor Franklyn Adair patient did contract with me if she has any SI she will notify me immediately

## 2023-06-12 ENCOUNTER — SOCIAL WORK (OUTPATIENT)
Dept: BEHAVIORAL/MENTAL HEALTH CLINIC | Facility: CLINIC | Age: 58
End: 2023-06-12
Payer: COMMERCIAL

## 2023-06-12 DIAGNOSIS — F32.A DEPRESSION, UNSPECIFIED DEPRESSION TYPE: Primary | ICD-10-CM

## 2023-06-12 PROCEDURE — 90837 PSYTX W PT 60 MINUTES: CPT | Performed by: SOCIAL WORKER

## 2023-06-12 RX ORDER — PEAK FLOW METER
EACH MISCELLANEOUS
COMMUNITY
Start: 2023-06-07
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
## 2023-06-13 ENCOUNTER — HOSPITAL ENCOUNTER (OUTPATIENT)
Dept: RADIOLOGY | Facility: HOSPITAL | Age: 58
Discharge: HOME | End: 2023-06-13
Attending: ORTHOPAEDIC SURGERY
Payer: COMMERCIAL

## 2023-06-13 ENCOUNTER — OFFICE VISIT (OUTPATIENT)
Dept: OBGYN CLINIC | Facility: HOSPITAL | Age: 58
End: 2023-06-13
Payer: COMMERCIAL

## 2023-06-13 VITALS
HEART RATE: 58 BPM | DIASTOLIC BLOOD PRESSURE: 81 MMHG | BODY MASS INDEX: 22.57 KG/M2 | SYSTOLIC BLOOD PRESSURE: 145 MMHG | WEIGHT: 127.4 LBS | HEIGHT: 63 IN

## 2023-06-13 DIAGNOSIS — Z96.659 STATUS POST TOTAL KNEE REPLACEMENT, UNSPECIFIED LATERALITY: ICD-10-CM

## 2023-06-13 DIAGNOSIS — Z47.1 AFTERCARE FOLLOWING LEFT KNEE JOINT REPLACEMENT SURGERY: ICD-10-CM

## 2023-06-13 DIAGNOSIS — Z96.652 AFTERCARE FOLLOWING LEFT KNEE JOINT REPLACEMENT SURGERY: ICD-10-CM

## 2023-06-13 DIAGNOSIS — Z47.1 AFTERCARE FOLLOWING LEFT KNEE JOINT REPLACEMENT SURGERY: Primary | ICD-10-CM

## 2023-06-13 DIAGNOSIS — Z96.652 AFTERCARE FOLLOWING LEFT KNEE JOINT REPLACEMENT SURGERY: Primary | ICD-10-CM

## 2023-06-13 PROCEDURE — 73560 X-RAY EXAM OF KNEE 1 OR 2: CPT

## 2023-06-13 PROCEDURE — 99213 OFFICE O/P EST LOW 20 MIN: CPT | Performed by: ORTHOPAEDIC SURGERY

## 2023-06-13 NOTE — PROGRESS NOTES
Assessment/Plan:  1  Aftercare following left knee joint replacement surgery        2  Status post total knee replacement, unspecified laterality          Scribe Attestation    I,:  Wayne Nugent am acting as a scribe while in the presence of the attending physician :       I,:  Vernon Short MD personally performed the services described in this documentation    as scribed in my presence :             Seema Mu upon examination and review of the x-rays of the left knee demonstrates a stable appearing left robotically assisted total knee arthroplasty  She also demonstrates functional range of motion and is stable in all planes  I did encourage her to continue to strengthen her quadriceps and hamstring  Also I noted that the clicking in the knee is normal at this point in her recovery due to the mechanical nature of her total knee arthroplasty  She was also advised on continuation of antibiotic prophylaxis prior to any dental work going forward  She is to continue with activities of daily living as tolerated  I would like to see her back in 3 months for repeat clinical evaluation and repeat x-ray  Subjective:   Dave Cain is a 62 y o  female who presents for follow-up evaluation of her left knee she is 6 months status post removal of or proximal tibial screw and robotically assisted total knee arthroplasty  She states that she is doing well and is getting stronger  She does remark on intermittent clicking of the knee  However, denies any gross instability  She also notes that the clicking is not painful  Today she denies any distal paresthesias  Overall she is quite happy with her progress up to this point  She notes that she has not been able to go to the gym recently secondary to development of planter fasciitis as well as a fall  Review of Systems   Constitutional: Negative for chills, fever and unexpected weight change  HENT: Negative for hearing loss, nosebleeds and sore throat  Eyes: Negative for pain, redness and visual disturbance  Respiratory: Negative for cough, shortness of breath and wheezing  Cardiovascular: Negative for chest pain, palpitations and leg swelling  Gastrointestinal: Negative for abdominal pain, nausea and vomiting  Endocrine: Negative for polydipsia and polyuria  Genitourinary: Negative for dysuria and hematuria  Musculoskeletal: Negative for arthralgias, joint swelling and myalgias  See HPI   Skin: Negative for rash and wound  Neurological: Negative for dizziness, numbness and headaches  Psychiatric/Behavioral: Negative for decreased concentration and suicidal ideas  The patient is not nervous/anxious            Past Medical History:   Diagnosis Date   • [facility] ) 95/18    recovering alcoholic, drug addict/compulsive over-eater   • Anemia    • Anxiety    • Arthritis    • Callus    • Complete Tear of the Anterior cruciate ligament tear of the knee    • COPD (chronic obstructive pulmonary disease) (HCC)    • Depression    • Difficulty walking    • Exposure to hepatitis B     Resolved: 17   • History of cholesteatoma     right ear   • History of COVID-19 2022    mild s/s   • History of prior pregnancies     A1   • Irritable bowel syndrome    • Lung disease    • Papanicolaou smear 2010    NEG   • Plantar fasciitis    • PONV (postoperative nausea and vomiting)    • Reactive airway disease     Last assessed: 16       Past Surgical History:   Procedure Laterality Date   • BREAST CYST ASPIRATION Right    • COLONOSCOPY     • CYSTECTOMY Left     benign cystectomy below breast   • DILATION AND CURETTAGE OF UTERUS     • FACIAL RECONSTRUCTION SURGERY     • FOOT SURGERY Right     endoscopic plantar fasciotomy   • GUM SURGERY  2019    with bone graft   • KNEE ARTHROSCOPY Left 1997    menisectomy   • KNEE ARTHROSCOPY Left 10/1997    ACL repair   • MAMMO (HISTORICAL) Bilateral 2010    benign   • MYRINGOTOMY W/ TUBES Right 2019    Procedure: MYRINGOTOMY W/ INSERTION VENTILATION TUBE EAR;  Surgeon: Kiki Angelucci, DO;  [facility];  Service: ENT   • VT ARTHRP KNE CONDYLE&PLATU MEDIAL&LAT COMPARTMENTS Left 2022    Procedure: ARTHROPLASTY KNEE TOTAL W ROBOT with screw removal left proximal tibia;   Surgeon: Sol Dalton MD;  [facility];  Service: Orthopedics   • VT CORRECTION HAMMERTOE Right 2022    Procedure: REPAIR HAMMERTOE 2nd;  Surgeon: Daniel Medellin DPM;  Location: [facility] MAIN OR;  Service: Podiatry   • VT SURGICAL ARTHROSCOPY SHOULDER W/ROTATOR CUFF RPR Left 2018    Procedure: SHOULDER ARTHROSCOPY ROTATOR CUFF REPAIR; SUBACROMIAL DECOMPRESSION;  Surgeon: Sally Galvin MD;  Location: [facility] MAIN OR;  Service: Orthopedics   • VT TMPP MASTOIDECT NTC/RCNSTED WALL W/O OCR Right 2019    Procedure: TYMPANOPLASTY;  Surgeon: Kiki Angelucci, DO;  [facility];  Service: ENT   • SHOULDER SURGERY     • TONSILLECTOMY AND ADENOIDECTOMY     • TUBAL LIGATION     • VAGINAL DELIVERY     • WISDOM TOOTH EXTRACTION         Family History   Problem Relation Age of Onset   • Mental illness Mother    • Substance Abuse Mother    • Lung cancer Mother    • Cancer Mother            • Hyperlipidemia Mother    • Hypertension Father         Benign Essential   • Cataracts Father    • Diabetes Father            • Mental illness Father    • Hyperlipidemia Father    • Depression Father    • Cancer Father            • Schizophrenia Sister    • Squamous cell carcinoma Sister    • Diabetes Sister    • Hypertension Sister    • Mental illness Sister    • Cancer Sister    • Substance Abuse Sister    • Cancer Maternal Grandmother            • Diabetes Maternal Grandmother    • Heart failure Maternal Grandfather    • Lung cancer Paternal Grandmother    • Hyperlipidemia Paternal Grandmother    • Cancer Paternal Grandmother            • "Hyperlipidemia Paternal Grandfather    • No Known Problems Son    • Pulmonary fibrosis Paternal Aunt    • Schizophrenia Paternal Aunt    • Diabetes Paternal Aunt    • Mental illness Paternal Aunt    • Diabetes Brother    • Hypertension Brother    • Hyperlipidemia Brother    • Diabetes Brother    • Hypertension Brother    • Diabetes Brother         possibly one or both   • Mental illness Paternal Aunt        Social History     Occupational History   • Not on file   Tobacco Use   • Smoking status: Every Day     Packs/day: 0 75     Years: 40 00     Total pack years: 30 00     Types: Cigarettes     Start date: 1/1/1980   • Smokeless tobacco: Never   Vaping Use   • Vaping Use: Never used   Substance and Sexual Activity   • Alcohol use: Not Currently     Comment: Quit 23 years ago  Recovering alcoholic   • Drug use: Not Currently     Types: Cocaine, \"Crack\" cocaine     Comment: clean since 1995   • Sexual activity: Yes     Partners: Male     Birth control/protection: None         Current Outpatient Medications:   •  albuterol (PROVENTIL HFA,VENTOLIN HFA) 90 mcg/act inhaler, Inhale 2 puffs every 4 (four) hours as needed for wheezing, Disp: 1 g, Rfl: 5  •  Calcium Carbonate-Vit D-Min (Caltrate 600+D Plus Minerals) 600-800 MG-UNIT CHEW, Chew 1 tablet daily, Disp: , Rfl:   •  dextromethorphan-guaifenesin (MUCINEX DM)  MG per 12 hr tablet, in the morning, Disp: , Rfl:   •  escitalopram (LEXAPRO) 5 mg tablet, Take 1 tablet (5 mg total) by mouth daily, Disp: 30 tablet, Rfl: 2  •  ferrous sulfate 324 (65 Fe) mg, Take one tablet daily  , Disp: 30 tablet, Rfl: 0  •  ipratropium (ATROVENT) 0 02 % nebulizer solution, Take 0 5 mg by nebulization 2 (two) times a day, Disp: , Rfl:   •  levocetirizine (XYZAL) 5 MG tablet, Take 1 tablet (5 mg total) by mouth daily, Disp: 30 tablet, Rfl: 0  •  Loperamide HCl (IMODIUM PO), Take 2 mg by mouth in the morning  , Disp: , Rfl:   •  LORazepam (ATIVAN) 0 5 mg tablet, TAKE ONE TABLET BY MOUTH " EVERY DAY AT BEDTIME AS NEEDED FOR ANXIETY  DO NOT FILL UNTIL 5/4/23, Disp: 30 tablet, Rfl: 0  •  MELATONIN PO, Take 10 mg by mouth daily at bedtime, Disp: , Rfl:   •  Multiple Minerals-Vitamins (ROSLYN-MAG-ZINC-D PO), Take 1 tablet by mouth daily, Disp: , Rfl:   •  Multiple Vitamins-Minerals (multivitamin with minerals) tablet, Take 1 tablet by mouth daily, Disp: , Rfl:   •  Personal Best Full Range SARAH, Use as directed, Disp: , Rfl:   •  pregabalin (LYRICA) 50 mg capsule, Take 1 capsule (50 mg total) by mouth daily at bedtime, Disp: 30 capsule, Rfl: 0  •  zolpidem (AMBIEN) 10 mg tablet, TAKE ONE TABLET BY MOUTH EVERY DAY AT BEDTIME AS NEEDED FOR SLEEP  DO NOT FILL UNTIL 5/4/23, Disp: 30 tablet, Rfl: 0    Allergies   Allergen Reactions   • Percocet [Oxycodone-Acetaminophen] GI Intolerance   • Metronidazole GI Intolerance       Objective:  Vitals:    06/13/23 0737   BP: 145/81   Pulse: 58       Left Knee Exam     Tenderness   The patient is experiencing no tenderness  Range of Motion   Extension: 0   Flexion: 120     Tests   Varus: negative Valgus: negative    Other   Erythema: absent  Scars: present (well healed vertical midline incision)  Sensation: normal  Pulse: present  Swelling: none  Effusion: no effusion present          Observations   Left Knee   Negative for effusion  Physical Exam  Vitals and nursing note reviewed  HENT:      Head: Normocephalic and atraumatic  Right Ear: External ear normal       Left Ear: External ear normal       Nose: Nose normal    Eyes:      General:         Right eye: No discharge  Left eye: No discharge  Conjunctiva/sclera: Conjunctivae normal    Cardiovascular:      Rate and Rhythm: Normal rate  Pulmonary:      Effort: Pulmonary effort is normal  No respiratory distress  Musculoskeletal:      Cervical back: Normal range of motion and neck supple  Left knee: No effusion  Skin:     General: Skin is warm and dry     Neurological:      Mental Status: She is alert and oriented to person, place, and time  I have personally reviewed pertinent films in PACS and my interpretation is as follows:    Xrays of the left knee obtained today June 13, 2023 demonstrate a stable a total knee arthroplasty  Implants are appropriately positioned and demonstrate no lucency or loosening demonstrated  This document was created using speech voice recognition software  Grammatical errors, random word insertions, pronoun errors, and incomplete sentences are an occasional consequence of this system due to software limitations, ambient noise, and hardware issues  Any formal questions or concerns about content, text, or information contained within the body of this dictation should be directly addressed to the provider for clarification

## 2023-06-14 ENCOUNTER — HOSPITAL ENCOUNTER (OUTPATIENT)
Dept: RADIOLOGY | Age: 58
Discharge: HOME | End: 2023-06-14
Payer: COMMERCIAL

## 2023-06-14 DIAGNOSIS — Z12.31 ENCOUNTER FOR SCREENING MAMMOGRAM FOR BREAST CANCER: ICD-10-CM

## 2023-06-14 PROCEDURE — 77067 SCR MAMMO BI INCL CAD: CPT

## 2023-06-14 PROCEDURE — 77063 BREAST TOMOSYNTHESIS BI: CPT

## 2023-06-15 ENCOUNTER — HOSPITAL ENCOUNTER (OUTPATIENT)
Dept: GASTROENTEROLOGY | Facility: AMBULARY SURGERY CENTER | Age: 58
Setting detail: OUTPATIENT SURGERY
End: 2023-06-15
Attending: INTERNAL MEDICINE
Payer: COMMERCIAL

## 2023-06-15 ENCOUNTER — ANESTHESIA EVENT (OUTPATIENT)
Dept: GASTROENTEROLOGY | Facility: AMBULARY SURGERY CENTER | Age: 58
End: 2023-06-15

## 2023-06-15 ENCOUNTER — ANESTHESIA (OUTPATIENT)
Dept: GASTROENTEROLOGY | Facility: AMBULARY SURGERY CENTER | Age: 58
End: 2023-06-15

## 2023-06-15 VITALS
BODY MASS INDEX: 22.82 KG/M2 | RESPIRATION RATE: 16 BRPM | WEIGHT: 124 LBS | TEMPERATURE: 97.5 F | HEIGHT: 62 IN | HEART RATE: 71 BPM | DIASTOLIC BLOOD PRESSURE: 81 MMHG | SYSTOLIC BLOOD PRESSURE: 138 MMHG | OXYGEN SATURATION: 100 %

## 2023-06-15 DIAGNOSIS — R13.14 PHARYNGOESOPHAGEAL DYSPHAGIA: ICD-10-CM

## 2023-06-15 PROCEDURE — 43239 EGD BIOPSY SINGLE/MULTIPLE: CPT | Performed by: INTERNAL MEDICINE

## 2023-06-15 PROCEDURE — 88305 TISSUE EXAM BY PATHOLOGIST: CPT | Performed by: PATHOLOGY

## 2023-06-15 RX ORDER — GLYCOPYRROLATE 0.2 MG/ML
INJECTION INTRAMUSCULAR; INTRAVENOUS AS NEEDED
Status: DISCONTINUED | OUTPATIENT
Start: 2023-06-15 | End: 2023-06-15

## 2023-06-15 RX ORDER — LIDOCAINE HYDROCHLORIDE 10 MG/ML
INJECTION, SOLUTION EPIDURAL; INFILTRATION; INTRACAUDAL; PERINEURAL AS NEEDED
Status: DISCONTINUED | OUTPATIENT
Start: 2023-06-15 | End: 2023-06-15

## 2023-06-15 RX ORDER — SODIUM CHLORIDE, SODIUM LACTATE, POTASSIUM CHLORIDE, CALCIUM CHLORIDE 600; 310; 30; 20 MG/100ML; MG/100ML; MG/100ML; MG/100ML
INJECTION, SOLUTION INTRAVENOUS CONTINUOUS PRN
Status: DISCONTINUED | OUTPATIENT
Start: 2023-06-15 | End: 2023-06-15

## 2023-06-15 RX ORDER — PROPOFOL 10 MG/ML
INJECTION, EMULSION INTRAVENOUS AS NEEDED
Status: DISCONTINUED | OUTPATIENT
Start: 2023-06-15 | End: 2023-06-15

## 2023-06-15 RX ADMIN — SODIUM CHLORIDE, SODIUM LACTATE, POTASSIUM CHLORIDE, AND CALCIUM CHLORIDE: .6; .31; .03; .02 INJECTION, SOLUTION INTRAVENOUS at 13:45

## 2023-06-15 RX ADMIN — PROPOFOL 40 MG: 10 INJECTION, EMULSION INTRAVENOUS at 13:48

## 2023-06-15 RX ADMIN — GLYCOPYRROLATE 0.1 MG: 0.2 INJECTION, SOLUTION INTRAMUSCULAR; INTRAVENOUS at 13:42

## 2023-06-15 RX ADMIN — PROPOFOL 40 MG: 10 INJECTION, EMULSION INTRAVENOUS at 13:50

## 2023-06-15 RX ADMIN — SODIUM CHLORIDE, SODIUM LACTATE, POTASSIUM CHLORIDE, AND CALCIUM CHLORIDE: .6; .31; .03; .02 INJECTION, SOLUTION INTRAVENOUS at 12:18

## 2023-06-15 RX ADMIN — PROPOFOL 80 MG: 10 INJECTION, EMULSION INTRAVENOUS at 13:46

## 2023-06-15 RX ADMIN — LIDOCAINE HYDROCHLORIDE 50 MG: 10 INJECTION, SOLUTION EPIDURAL; INFILTRATION; INTRACAUDAL; PERINEURAL at 13:46

## 2023-06-15 RX ADMIN — PROPOFOL 40 MG: 10 INJECTION, EMULSION INTRAVENOUS at 13:53

## 2023-06-15 NOTE — H&P
History and Physical - [facility]  Shahnaz Mann 62 y o  female MRN: [id number]                  HPI: Shahnaz Mann is a 62y o  year old female who presents for dysphagia for EGD  REVIEW OF SYSTEMS: Per the HPI, and otherwise unremarkable  Historical Information   Past Medical History:   Diagnosis Date   • Alcoholism ([facility] 75 ) 95/18    recovering alcoholic, drug addict/compulsive over-eater   • Anemia    • Anxiety    • Arthritis    • Callus    • Complete Tear of the Anterior cruciate ligament tear of the knee    • COPD (chronic obstructive pulmonary disease) ([facility] 75 )    • Depression    • Difficulty walking    • Exposure to hepatitis B     Resolved: 17   • History of cholesteatoma     right ear   • History of COVID-19 2022    mild s/s   • History of prior pregnancies     A1   • Irritable bowel syndrome    • Lung disease    • Papanicolaou smear 2010    NEG   • Plantar fasciitis    • PONV (postoperative nausea and vomiting)    • Reactive airway disease     Last assessed: 16     Past Surgical History:   Procedure Laterality Date   • BREAST CYST ASPIRATION Right    • COLONOSCOPY     • CYSTECTOMY Left     benign cystectomy below breast   • DILATION AND CURETTAGE OF UTERUS     • FACIAL RECONSTRUCTION SURGERY     • FOOT SURGERY Right     endoscopic plantar fasciotomy   • GUM SURGERY  2019    with bone graft   • KNEE ARTHROSCOPY Left 1997    menisectomy   • KNEE ARTHROSCOPY Left 10/1997    ACL repair   • MAMMO (HISTORICAL) Bilateral 2010    benign   • MYRINGOTOMY W/ TUBES Right 2019    Procedure: MYRINGOTOMY W/ INSERTION VENTILATION TUBE EAR;  Surgeon: Lyly Serrano DO;  [facility];  Service: ENT   • AL ARTHRP KNE CONDYLE&PLATU MEDIAL&LAT COMPARTMENTS Left 2022    Procedure: ARTHROPLASTY KNEE TOTAL W ROBOT with screw removal left proximal tibia;   Surgeon: Barney Mathews MD;  Location[facility];  Service: "Orthopedics   • TN CORRECTION HAMMERTOE Right 2022    Procedure: REPAIR HAMMERTOE 2nd;  Surgeon: Luis Valadez DPM;  Location: [facility] MAIN OR;  Service: Podiatry   • TN SURGICAL ARTHROSCOPY SHOULDER W/ROTATOR CUFF RPR Left 2018    Procedure: SHOULDER ARTHROSCOPY ROTATOR CUFF REPAIR; SUBACROMIAL DECOMPRESSION;  Surgeon: Maria Alejandra Mason MD;  [facility];  Service: Orthopedics   • TN TMPP MASTOIDECT NTC/RCNSTED WALL W/O OCR Right 2019    Procedure: TYMPANOPLASTY;  Surgeon: Waylon Bear DO;  Location:  MAIN OR;  Service: ENT   • SHOULDER SURGERY     • TONSILLECTOMY AND ADENOIDECTOMY     • TUBAL LIGATION     • VAGINAL DELIVERY     • WISDOM TOOTH EXTRACTION       Social History   Social History     Substance and Sexual Activity   Alcohol Use Not Currently    Comment: Quit 23 years ago   Recovering alcoholic     Social History     Substance and Sexual Activity   Drug Use Not Currently   • Types: Cocaine, \"Crack\" cocaine    Comment: clean since      Social History     Tobacco Use   Smoking Status Every Day   • Packs/day: 0 75   • Years: 40 00   • Total pack years: 30 00   • Types: Cigarettes   • Start date: 1980   Smokeless Tobacco Never     Family History   Problem Relation Age of Onset   • Mental illness Mother    • Substance Abuse Mother    • Lung cancer Mother    • Cancer Mother            • Hyperlipidemia Mother    • Hypertension Father         Benign Essential   • Cataracts Father    • Diabetes Father            • Mental illness Father    • Hyperlipidemia Father    • Depression Father    • Cancer Father            • Schizophrenia Sister    • Squamous cell carcinoma Sister    • Diabetes Sister    • Hypertension Sister    • Mental illness Sister    • Cancer Sister    • Substance Abuse Sister    • Diabetes Brother    • Hypertension Brother    • Hyperlipidemia Brother    • Colon polyps Brother    • Diabetes Brother    • Hypertension Brother    • Diabetes " "Brother         possibly one or both   • No Known Problems Son    • Cancer Maternal Grandmother            • Diabetes Maternal Grandmother    • Heart failure Maternal Grandfather    • Lung cancer Paternal Grandmother    • Hyperlipidemia Paternal Grandmother    • Cancer Paternal Grandmother            • Hyperlipidemia Paternal Grandfather    • Pulmonary fibrosis Paternal Aunt    • Schizophrenia Paternal Aunt    • Diabetes Paternal Aunt    • Mental illness Paternal Aunt    • Mental illness Paternal Aunt        Meds/Allergies       Current Outpatient Medications:   •  albuterol (PROVENTIL HFA,VENTOLIN HFA) 90 mcg/act inhaler  •  Calcium Carbonate-Vit D-Min (Caltrate 600+D Plus Minerals) 600-800 MG-UNIT CHEW  •  dextromethorphan-guaifenesin (MUCINEX DM)  MG per 12 hr tablet  •  escitalopram (LEXAPRO) 5 mg tablet  •  ferrous sulfate 324 (65 Fe) mg  •  ipratropium (ATROVENT) 0 02 % nebulizer solution  •  levocetirizine (XYZAL) 5 MG tablet  •  Loperamide HCl (IMODIUM PO)  •  LORazepam (ATIVAN) 0 5 mg tablet  •  MELATONIN PO  •  Multiple Minerals-Vitamins (ROSLYN-MAG-ZINC-D PO)  •  Multiple Vitamins-Minerals (multivitamin with minerals) tablet  •  pregabalin (LYRICA) 50 mg capsule  •  zolpidem (AMBIEN) 10 mg tablet  •  Personal Best Full Range SARAH  No current facility-administered medications for this encounter      Facility-Administered Medications Ordered in Other Encounters:   •  lactated ringers infusion, , Intravenous, Continuous PRN, New Bag at 06/15/23 1218    Allergies   Allergen Reactions   • Percocet [Oxycodone-Acetaminophen] GI Intolerance   • Metronidazole GI Intolerance       Objective     /57   Pulse 66   Resp 18   Ht 5' 2\" (1 575 m)   Wt 56 2 kg (124 lb)   LMP 2018 (Approximate)   SpO2 97%   BMI 22 68 kg/m²       PHYSICAL EXAM    Gen: NAD  Head: NCAT  CV: RRR  CHEST: Clear  ABD: soft, NT/ND  EXT: no edema      ASSESSMENT/PLAN:  This is a 62y o  year old female here for " EGD, and she is stable and optimized for her procedure

## 2023-06-15 NOTE — ANESTHESIA PREPROCEDURE EVALUATION
Procedure:  EGD    Relevant Problems   ANESTHESIA   (+) PONV (postoperative nausea and vomiting)      GI/HEPATIC   (+) Pharyngoesophageal dysphagia      MUSCULOSKELETAL   (+) Fibromyalgia   (+) Primary osteoarthritis of left knee      NEURO/PSYCH   (+) Anxiety   (+) Anxiety disorder   (+) Depression   (+) Fibromyalgia   (+) Post traumatic stress disorder (PTSD)      PULMONARY   (+) COPD (chronic obstructive pulmonary disease) (HCC)   (+) Smoking greater than 20 pack years        Physical Exam    Airway    Mallampati score: II         Dental       Cardiovascular  Cardiovascular exam normal    Pulmonary  Pulmonary exam normal     Other Findings        Anesthesia Plan  ASA Score- 2     Anesthesia Type- IV sedation with anesthesia with ASA Monitors  Additional Monitors:   Airway Plan:           Plan Factors-Exercise tolerance (METS): >4 METS  Chart reviewed  EKG reviewed  Imaging results reviewed  Existing labs reviewed  Patient summary reviewed  Patient is not a current smoker  Patient not instructed to abstain from smoking on day of procedure  Patient did not smoke on day of surgery  Obstructive sleep apnea risk education given perioperatively  Induction-     Postoperative Plan-     Informed Consent- Anesthetic plan and risks discussed with patient  I personally reviewed this patient with the CRNA  Discussed and agreed on the Anesthesia Plan with the CRNA  Davi Bentley

## 2023-06-15 NOTE — ANESTHESIA POSTPROCEDURE EVALUATION
Post-Op Assessment Note    CV Status:  Stable  Pain Score: 0    Pain management: adequate     Mental Status:  Alert and awake   Hydration Status:  Euvolemic   PONV Controlled:  Controlled   Airway Patency:  Patent      Post Op Vitals Reviewed: Yes      Staff: CRNA         No notable events documented      BP   105/54   Temp      Pulse  79   Resp   19   SpO2   99

## 2023-06-17 DIAGNOSIS — G47.00 INSOMNIA, UNSPECIFIED TYPE: ICD-10-CM

## 2023-06-17 DIAGNOSIS — F41.9 ANXIETY: ICD-10-CM

## 2023-06-17 RX ORDER — LEVOCETIRIZINE DIHYDROCHLORIDE 5 MG/1
TABLET, FILM COATED ORAL
Qty: 30 TABLET | Refills: 0 | Status: SHIPPED | OUTPATIENT
Start: 2023-06-17

## 2023-06-19 PROCEDURE — 88305 TISSUE EXAM BY PATHOLOGIST: CPT | Performed by: PATHOLOGY

## 2023-06-22 PROBLEM — Z13.820 SCREENING FOR OSTEOPOROSIS: Status: RESOLVED | Noted: 2023-04-23 | Resolved: 2023-06-22

## 2023-06-29 DIAGNOSIS — G25.81 RESTLESS LEG SYNDROME: ICD-10-CM

## 2023-07-01 RX ORDER — PREGABALIN 50 MG/1
50 CAPSULE ORAL
Qty: 30 CAPSULE | Refills: 0 | Status: SHIPPED | OUTPATIENT
Start: 2023-07-01

## 2023-07-18 ENCOUNTER — OFFICE VISIT (OUTPATIENT)
Dept: INTERNAL MEDICINE CLINIC | Facility: CLINIC | Age: 58
End: 2023-07-18
Payer: COMMERCIAL

## 2023-07-18 VITALS
BODY MASS INDEX: 23.34 KG/M2 | WEIGHT: 126.8 LBS | SYSTOLIC BLOOD PRESSURE: 130 MMHG | OXYGEN SATURATION: 93 % | HEART RATE: 58 BPM | DIASTOLIC BLOOD PRESSURE: 82 MMHG | HEIGHT: 62 IN | RESPIRATION RATE: 16 BRPM

## 2023-07-18 DIAGNOSIS — M79.7 FIBROMYALGIA: ICD-10-CM

## 2023-07-18 DIAGNOSIS — Z23 ENCOUNTER FOR IMMUNIZATION: Primary | ICD-10-CM

## 2023-07-18 DIAGNOSIS — F32.A DEPRESSION, UNSPECIFIED DEPRESSION TYPE: ICD-10-CM

## 2023-07-18 DIAGNOSIS — Z23 NEED FOR TDAP VACCINATION: ICD-10-CM

## 2023-07-18 DIAGNOSIS — F41.9 ANXIETY DISORDER, UNSPECIFIED TYPE: ICD-10-CM

## 2023-07-18 DIAGNOSIS — G25.81 RLS (RESTLESS LEGS SYNDROME): ICD-10-CM

## 2023-07-18 PROCEDURE — 99214 OFFICE O/P EST MOD 30 MIN: CPT | Performed by: INTERNAL MEDICINE

## 2023-07-18 PROCEDURE — 90715 TDAP VACCINE 7 YRS/> IM: CPT

## 2023-07-18 PROCEDURE — 90471 IMMUNIZATION ADMIN: CPT

## 2023-07-18 RX ORDER — ESCITALOPRAM OXALATE 10 MG/1
10 TABLET ORAL DAILY
Qty: 30 TABLET | Refills: 3 | Status: SHIPPED | OUTPATIENT
Start: 2023-07-18

## 2023-07-18 NOTE — ASSESSMENT & PLAN NOTE
Clinically stable and doing well continue the current medical regiment will continue monitor.   Continue Lyrica 50 mg at bedtime

## 2023-07-18 NOTE — PROGRESS NOTES
Assessment/Plan:    Depression  No SI partial improvement in the depression tolerating Lexapro low-dose we will increase Lexapro to 10 mg once daily patient has met with counselor Madhav Mcconnell but does have her own private counselor that is very helpful we will continue to monitor RTO in 6 to 8 weeks    Encounter for immunization  Patient will receive a Tdap vaccine    RLS (restless legs syndrome)  Increased restless leg symptoms I have asked the patient to start monitoring her sleep and if she notices any symptoms of sleep apnea including snoring, witnessed apneas or difficulty with breathing in the middle of the night to bring it to my attention immediately check CBC, iron panel, BMP and magnesium to consider low-dose of Mirapex next visit if ongoing symptoms    Anxiety disorder  Ongoing symptoms of anxiety related to relationship problems and disagreements with her  she is working with a counselor encouraged to continue ongoing counseling    Fibromyalgia  Clinically stable and doing well continue the current medical regiment will continue monitor.   Continue Lyrica 50 mg at bedtime         Problem List Items Addressed This Visit        Other    Anxiety disorder     Ongoing symptoms of anxiety related to relationship problems and disagreements with her  she is working with a counselor encouraged to continue ongoing counseling         Relevant Medications    escitalopram (LEXAPRO) 10 mg tablet    Depression     No SI partial improvement in the depression tolerating Lexapro low-dose we will increase Lexapro to 10 mg once daily patient has met with counselor Madhav Mcconnell but does have her own private counselor that is very helpful we will continue to monitor RTO in 6 to 8 weeks         Relevant Medications    escitalopram (LEXAPRO) 10 mg tablet    Encounter for immunization - Primary     Patient will receive a Tdap vaccine         Fibromyalgia     Clinically stable and doing well continue the current medical regiment will continue monitor. Continue Lyrica 50 mg at bedtime         RLS (restless legs syndrome)     Increased restless leg symptoms I have asked the patient to start monitoring her sleep and if she notices any symptoms of sleep apnea including snoring, witnessed apneas or difficulty with breathing in the middle of the night to bring it to my attention immediately check CBC, iron panel, BMP and magnesium to consider low-dose of Mirapex next visit if ongoing symptoms         Relevant Orders    CBC (Includes Diff/Plt) (Refl)    Iron Panel (Includes Ferritin, Iron Sat%, Iron, and TIBC)    Basic metabolic panel    Magnesium   Other Visit Diagnoses     Need for Tdap vaccination        Relevant Orders    TDAP VACCINE GREATER THAN OR EQUAL TO 6YO IM (Completed)       Return to office 2  months  call if any problems    Subjective:      Patient ID: Tiera Capellan is a 62 y.o. female. HPI/62year old female coming in for a follow up office visit regarding anxiety, depression, restless leg syndrome, fibromyalgia; the patient reports me compliant taking medications without untoward side effects the. The patient is here to review his medical condition, update me on the medical condition and the patient reports me no hospitalizations and no ER visits. No injuries no illnesses she reports me partial improvement in her anxiety and depression with a low-dose of Lexapro 5 mg once daily she is tolerating well without any side effects. She also noticed increased restless leg syndrome symptoms she is uncertain if there is any snoring or difficulty with breathing in the middle of the night. She would like to receive 8 Tdap vaccine today. Trying to follow healthy and balanced diet she does report to me ongoing anxiety related to disagreements with her . She does have a counselor which she like she has met with counselor Alis Herman also. Because of this she reports me anxiety.   She gives me an example that with the new kitchen her  likes all of the kitchen countertops to be neat and organized but the patient is different she feels they should be functionable and able to use them with different kitchen items. Because of the disagreement this leads to anxiety for the patient. No side effect partial improvement    The following portions of the patient's history were reviewed and updated as appropriate: allergies, current medications, past family history, past medical history, past social history, past surgical history and problem list.    Review of Systems   Constitutional: Negative for activity change, appetite change and unexpected weight change. HENT: Negative for congestion and postnasal drip. Eyes: Negative for visual disturbance. Respiratory: Negative for cough and shortness of breath. Cardiovascular: Negative for chest pain. Gastrointestinal: Negative for abdominal pain, diarrhea, nausea and vomiting. Neurological: Negative for dizziness, light-headedness and headaches. Hematological: Negative for adenopathy. Psychiatric/Behavioral: Negative for suicidal ideas. The patient is nervous/anxious. Objective:    Return in about 3 months (around 10/18/2023). No results found.       Allergies   Allergen Reactions   • Percocet [Oxycodone-Acetaminophen] GI Intolerance   • Metronidazole GI Intolerance       Past Medical History:   Diagnosis Date   • Alcoholism ([address]) 95/18    recovering alcoholic, drug addict/compulsive over-eater   • Anemia    • Anxiety    • Arthritis    • Callus    • Complete Tear of the Anterior cruciate ligament tear of the knee    • COPD (chronic obstructive pulmonary disease) (HCC)    • Depression    • Difficulty walking    • Exposure to hepatitis B     Resolved: 17   • History of cholesteatoma     right ear   • History of COVID-19 2022    mild s/s   • History of prior pregnancies     A1   • Irritable bowel syndrome    • Lung disease    • Papanicolaou smear 08/19/2010    NEG   • Plantar fasciitis    • PONV (postoperative nausea and vomiting)    • Reactive airway disease     Last assessed: 9/14/16     Past Surgical History:   Procedure Laterality Date   • BREAST CYST ASPIRATION Right 1993   • COLONOSCOPY     • CYSTECTOMY Left     benign cystectomy below breast   • DILATION AND CURETTAGE OF UTERUS     • FACIAL RECONSTRUCTION SURGERY     • FOOT SURGERY Right     endoscopic plantar fasciotomy   • GUM SURGERY  09/2019    with bone graft   • KNEE ARTHROSCOPY Left 05/1997    menisectomy   • KNEE ARTHROSCOPY Left 10/1997    ACL repair   • MAMMO (HISTORICAL) Bilateral 09/13/2010    benign   • MYRINGOTOMY W/ TUBES Right 03/01/2019    Procedure: MYRINGOTOMY W/ INSERTION VENTILATION TUBE EAR;  Surgeon: Juan Miguel Gomez DO;  [facility];  Service: ENT   • FL ARTHRP KNE CONDYLE&PLATU MEDIAL&LAT COMPARTMENTS Left 12/14/2022    Procedure: ARTHROPLASTY KNEE TOTAL W ROBOT with screw removal left proximal tibia;   Surgeon: Marga Lange MD;  [facility];  Service: Orthopedics   • FL CORRECTION HAMMERTOE Right 02/04/2022    Procedure: REPAIR HAMMERTOE 2nd;  Surgeon: Oscar Perez DPM;  Location: [address] [place] [address] MAIN OR;  Service: Podiatry   • FL SURGICAL ARTHROSCOPY SHOULDER W/ROTATOR CUFF RPR Left 01/31/2018    Procedure: SHOULDER ARTHROSCOPY ROTATOR CUFF REPAIR; SUBACROMIAL DECOMPRESSION;  Surgeon: Arvind Wiggins MD;  Location: [facility] MAIN OR;  Service: Orthopedics   • FL TMPP MASTOIDECT NTC/RCNSTED WALL W/O OCR Right 03/01/2019    Procedure: TYMPANOPLASTY;  Surgeon: Juan Miguel Gomez DO;  [facility];  Service: ENT   • SHOULDER SURGERY     • TONSILLECTOMY AND ADENOIDECTOMY     • TUBAL LIGATION     • VAGINAL DELIVERY  1987   • WISDOM TOOTH EXTRACTION       Current Outpatient Medications on File Prior to Visit   Medication Sig Dispense Refill   • albuterol (PROVENTIL HFA,VENTOLIN HFA) 90 mcg/act inhaler Inhale 2 puffs every 4 (four) hours as needed for wheezing 1 g 5   • Calcium Carbonate-Vit D-Min (Caltrate 600+D Plus Minerals) 600-800 MG-UNIT CHEW Chew 1 tablet daily     • dextromethorphan-guaifenesin (MUCINEX DM)  MG per 12 hr tablet in the morning     • ferrous sulfate 324 (65 Fe) mg Take one tablet daily. 30 tablet 0   • ipratropium (ATROVENT) 0.02 % nebulizer solution Take 0.5 mg by nebulization 2 (two) times a day     • levocetirizine (XYZAL) 5 MG tablet TAKE ONE TABLET BY MOUTH EVERY DAY 30 tablet 0   • Loperamide HCl (IMODIUM PO) Take 2 mg by mouth in the morning       • LORazepam (ATIVAN) 0.5 mg tablet TAKE ONE TABLET BY MOUTH EVERY DAY AT BEDTIME AS NEEDED FOR ANXIETY. DO NOT FILL UNTIL 23 30 tablet 0   • MELATONIN PO Take 10 mg by mouth daily at bedtime     • Multiple Minerals-Vitamins (ROSLYN-MAG-ZINC-D PO) Take 1 tablet by mouth daily     • Multiple Vitamins-Minerals (multivitamin with minerals) tablet Take 1 tablet by mouth daily     • Personal Best Full Range SARAH Use as directed     • pregabalin (LYRICA) 50 mg capsule TAKE 1 CAPSULE (50 MG TOTAL) BY MOUTH DAILY AT BEDTIME 30 capsule 0   • zolpidem (AMBIEN) 10 mg tablet Take 1 tablet (10 mg total) by mouth daily at bedtime as needed for sleep 30 tablet 0   • [DISCONTINUED] escitalopram (LEXAPRO) 5 mg tablet Take 1 tablet (5 mg total) by mouth daily 30 tablet 2     No current facility-administered medications on file prior to visit.      Family History   Problem Relation Age of Onset   • Mental illness Mother    • Substance Abuse Mother    • Lung cancer Mother    • Cancer Mother            • Hyperlipidemia Mother    • Hypertension Father         Benign Essential   • Cataracts Father    • Diabetes Father            • Mental illness Father    • Hyperlipidemia Father    • Depression Father    • Cancer Father            • Schizophrenia Sister    • Squamous cell carcinoma Sister    • Diabetes Sister    • Hypertension Sister    • Mental illness Sister    • Cancer Sister • Substance Abuse Sister    • Diabetes Brother    • Hypertension Brother    • Hyperlipidemia Brother    • Colon polyps Brother    • Diabetes Brother    • Hypertension Brother    • Diabetes Brother         possibly one or both   • No Known Problems Son    • Cancer Maternal Grandmother            • Diabetes Maternal Grandmother    • Heart failure Maternal Grandfather    • Lung cancer Paternal Grandmother    • Hyperlipidemia Paternal Grandmother    • Cancer Paternal Grandmother            • Hyperlipidemia Paternal Grandfather    • Pulmonary fibrosis Paternal Aunt    • Schizophrenia Paternal Aunt    • Diabetes Paternal Aunt    • Mental illness Paternal Aunt    • Mental illness Paternal Aunt      Social History     Socioeconomic History   • Marital status: /Civil Union     Spouse name: Not on file   • Number of children: 1   • Years of education: Not on file   • Highest education level: Not on file   Occupational History   • Not on file   Tobacco Use   • Smoking status: Every Day     Packs/day: 0.75     Years: 40.00     Total pack years: 30.00     Types: Cigarettes     Start date: 1980   • Smokeless tobacco: Never   Vaping Use   • Vaping Use: Never used   Substance and Sexual Activity   • Alcohol use: Not Currently     Comment: Quit 23 years ago.  Recovering alcoholic   • Drug use: Not Currently     Types: Cocaine, "Crack" cocaine     Comment: clean since    • Sexual activity: Yes     Partners: Male     Birth control/protection: None   Other Topics Concern   • Not on file   Social History Narrative    Caffeine use    Daily coffee consumption     Social Determinants of Health     Financial Resource Strain: Not on file   Food Insecurity: Not on file   Transportation Needs: Not on file   Physical Activity: Not on file   Stress: Not on file   Social Connections: Not on file   Intimate Partner Violence: Not on file   Housing Stability: Not on file     Vitals:    23 0822   BP: 130/82 Pulse: 58   Resp: 16   SpO2: 93%   Weight: 57.5 kg (126 lb 12.8 oz)   Height: 5' 2" (1.575 m)     Results for orders placed or performed during the hospital encounter of 06/15/23   Tissue Exam   Result Value Ref Range    Case Report       Surgical Pathology Report                         Case: [id number]                                   Authorizing Provider:  Page Mazariegos DO  Collected:           06/15/2023 1354              Ordering Location:     [facility]        Received:            06/15/2023 2008                                     [facility]                                                    Pathologist:           Sharri King MD                                                             Specimens:   A) - Esophagus, distal esophagus cold bx                                                            B) - Esophagus, proximal esophagus cold bx                                                 Final Diagnosis       A. Esophagus, distal esophagus cold bx:  - Benign squamous mucosa without specific histopathologic abnormality.  - No increased intraepithelial eosinophils seen  - Negative for dysplasia. B. Esophagus, proximal esophagus cold bx:  - Benign squamous mucosa without specific histopathologic abnormality.  - No increased intraepithelial eosinophils seen  - Negative for dysplasia. Additional Information       All reported additional testing was performed with appropriately reactive controls. These tests were developed and their performance characteristics determined by [facility] or appropriate performing facility, though some tests may be performed on tissues which have not been validated for performance characteristics (such as staining performed on alcohol exposed cell blocks and decalcified tissues). Results should be interpreted with caution and in the context of the patients’ clinical condition.  These tests may not be cleared or approved by the U.S. Food and Drug Administration, though the FDA has determined that such clearance or approval is not necessary. These tests are used for clinical purposes and they should not be regarded as investigational or for research. This laboratory has been approved by CLIA 88, designated as a high-complexity laboratory and is qualified to perform these tests. Interpretation performed at [address] Description       A. The specimen is received in formalin, labeled with the patient's name and hospital number, and is designated " distal esophagus biopsy". The specimen consists of 2 white-tan pink soft tissue fragments each measuring 0.3 cm in greatest dimension. Entirely submitted. One screened cassette. B. The specimen is received in formalin, labeled with the patient's name and hospital number, and is designated " proximal esophagus biopsy". The specimen consists of 2 white-tan pink soft tissue fragments measuring 0.1 cm and 0.3 cm in greatest dimension. Entirely submitted. One screened cassette. Note: The estimated total formalin fixation time based upon information provided by the submitting clinician and the standard processing schedule is under 72 hours. MSequino      Clinical Information       FINDINGS:  · The esophagus appeared normal.  · Performed multiple forceps biopsies in the upper third of the esophagus and lower third of the esophagus  · The stomach appeared normal. On direct and retroflexed views  · The duodenum appeared normal. The first and second parts were visualized. Weight (last 2 days)     Date/Time Weight    07/18/23 0822 57.5 (126.8)        Body mass index is 23.19 kg/m².   BP      Temp      Pulse     Resp      SpO2        Vitals:    07/18/23 0822   Weight: 57.5 kg (126 lb 12.8 oz)     Vitals:    07/18/23 0822   Weight: 57.5 kg (126 lb 12.8 oz)       /82   Pulse 58   Resp 16   Ht 5' 2" (1.575 m) Wt 57.5 kg (126 lb 12.8 oz)   LMP 04/20/2018 (Approximate)   SpO2 93%   BMI 23.19 kg/m²          Physical Exam  Vitals and nursing note reviewed. Constitutional:       General: She is not in acute distress. Appearance: Normal appearance. She is well-developed. She is not ill-appearing, toxic-appearing or diaphoretic. HENT:      Head: Normocephalic. Eyes:      General: No scleral icterus. Right eye: No discharge. Left eye: No discharge. Conjunctiva/sclera: Conjunctivae normal.      Pupils: Pupils are equal, round, and reactive to light. Cardiovascular:      Rate and Rhythm: Normal rate and regular rhythm. Heart sounds: Normal heart sounds. No murmur heard. No friction rub. No gallop. Pulmonary:      Effort: No respiratory distress. Breath sounds: Normal breath sounds. No wheezing or rales. Abdominal:      General: Bowel sounds are normal. There is no distension. Palpations: Abdomen is soft. There is no mass. Tenderness: There is no abdominal tenderness. There is no guarding or rebound. Musculoskeletal:         General: No deformity. Cervical back: Neck supple. Lymphadenopathy:      Cervical: No cervical adenopathy. Neurological:      Mental Status: She is alert. Coordination: Coordination normal.   Psychiatric:         Mood and Affect: Mood is anxious. Mood is not depressed. Thought Content: Thought content does not include suicidal ideation.

## 2023-07-18 NOTE — ASSESSMENT & PLAN NOTE
Ongoing symptoms of anxiety related to relationship problems and disagreements with her  she is working with a counselor encouraged to continue ongoing counseling

## 2023-07-18 NOTE — ASSESSMENT & PLAN NOTE
No SI partial improvement in the depression tolerating Lexapro low-dose we will increase Lexapro to 10 mg once daily patient has met with counselor Jonatan Peguero but does have her own private counselor that is very helpful we will continue to monitor RTO in 6 to 8 weeks

## 2023-07-18 NOTE — ASSESSMENT & PLAN NOTE
Increased restless leg symptoms I have asked the patient to start monitoring her sleep and if she notices any symptoms of sleep apnea including snoring, witnessed apneas or difficulty with breathing in the middle of the night to bring it to my attention immediately check CBC, iron panel, BMP and magnesium to consider low-dose of Mirapex next visit if ongoing symptoms

## 2023-07-20 DIAGNOSIS — F41.9 ANXIETY: ICD-10-CM

## 2023-07-20 DIAGNOSIS — G47.00 INSOMNIA, UNSPECIFIED TYPE: ICD-10-CM

## 2023-07-20 RX ORDER — LEVOCETIRIZINE DIHYDROCHLORIDE 5 MG/1
TABLET, FILM COATED ORAL
Qty: 30 TABLET | Refills: 0 | Status: SHIPPED | OUTPATIENT
Start: 2023-07-20

## 2023-07-24 ENCOUNTER — LAB (OUTPATIENT)
Dept: LAB | Age: 58
End: 2023-07-24
Payer: COMMERCIAL

## 2023-07-24 DIAGNOSIS — G25.81 RLS (RESTLESS LEGS SYNDROME): ICD-10-CM

## 2023-07-24 LAB
ANION GAP SERPL CALCULATED.3IONS-SCNC: 4 MMOL/L
BASOPHILS # BLD AUTO: 0.06 THOUSANDS/ÂΜL (ref 0–0.1)
BASOPHILS NFR BLD AUTO: 1 % (ref 0–1)
BUN SERPL-MCNC: 19 MG/DL (ref 5–25)
CALCIUM SERPL-MCNC: 9.2 MG/DL (ref 8.3–10.1)
CHLORIDE SERPL-SCNC: 104 MMOL/L (ref 96–108)
CO2 SERPL-SCNC: 28 MMOL/L (ref 21–32)
CREAT SERPL-MCNC: 0.73 MG/DL (ref 0.6–1.3)
EOSINOPHIL # BLD AUTO: 0.15 THOUSAND/ÂΜL (ref 0–0.61)
EOSINOPHIL NFR BLD AUTO: 3 % (ref 0–6)
ERYTHROCYTE [DISTWIDTH] IN BLOOD BY AUTOMATED COUNT: 13.6 % (ref 11.6–15.1)
FERRITIN SERPL-MCNC: 21 NG/ML (ref 11–307)
GFR SERPL CREATININE-BSD FRML MDRD: 91 ML/MIN/1.73SQ M
GLUCOSE P FAST SERPL-MCNC: 95 MG/DL (ref 65–99)
HCT VFR BLD AUTO: 40.3 % (ref 34.8–46.1)
HGB BLD-MCNC: 13.5 G/DL (ref 11.5–15.4)
IMM GRANULOCYTES # BLD AUTO: 0.02 THOUSAND/UL (ref 0–0.2)
IMM GRANULOCYTES NFR BLD AUTO: 0 % (ref 0–2)
IRON SATN MFR SERPL: 37 % (ref 15–50)
IRON SERPL-MCNC: 111 UG/DL (ref 50–170)
LYMPHOCYTES # BLD AUTO: 1.54 THOUSANDS/ÂΜL (ref 0.6–4.47)
LYMPHOCYTES NFR BLD AUTO: 26 % (ref 14–44)
MAGNESIUM SERPL-MCNC: 2.2 MG/DL (ref 1.6–2.6)
MCH RBC QN AUTO: 31.5 PG (ref 26.8–34.3)
MCHC RBC AUTO-ENTMCNC: 33.5 G/DL (ref 31.4–37.4)
MCV RBC AUTO: 94 FL (ref 82–98)
MONOCYTES # BLD AUTO: 0.65 THOUSAND/ÂΜL (ref 0.17–1.22)
MONOCYTES NFR BLD AUTO: 11 % (ref 4–12)
NEUTROPHILS # BLD AUTO: 3.6 THOUSANDS/ÂΜL (ref 1.85–7.62)
NEUTS SEG NFR BLD AUTO: 59 % (ref 43–75)
NRBC BLD AUTO-RTO: 0 /100 WBCS
PLATELET # BLD AUTO: 194 THOUSANDS/UL (ref 149–390)
PMV BLD AUTO: 11.1 FL (ref 8.9–12.7)
POTASSIUM SERPL-SCNC: 4.2 MMOL/L (ref 3.5–5.3)
RBC # BLD AUTO: 4.29 MILLION/UL (ref 3.81–5.12)
SODIUM SERPL-SCNC: 136 MMOL/L (ref 135–147)
TIBC SERPL-MCNC: 301 UG/DL (ref 250–450)
WBC # BLD AUTO: 6.02 THOUSAND/UL (ref 4.31–10.16)

## 2023-07-24 PROCEDURE — 36415 COLL VENOUS BLD VENIPUNCTURE: CPT

## 2023-07-24 PROCEDURE — 83550 IRON BINDING TEST: CPT

## 2023-07-24 PROCEDURE — 83735 ASSAY OF MAGNESIUM: CPT

## 2023-07-24 PROCEDURE — 82728 ASSAY OF FERRITIN: CPT

## 2023-07-24 PROCEDURE — 80048 BASIC METABOLIC PNL TOTAL CA: CPT

## 2023-07-24 PROCEDURE — 83540 ASSAY OF IRON: CPT

## 2023-07-24 PROCEDURE — 85025 COMPLETE CBC W/AUTO DIFF WBC: CPT

## 2023-07-29 DIAGNOSIS — G25.81 RESTLESS LEG SYNDROME: ICD-10-CM

## 2023-07-30 RX ORDER — PREGABALIN 50 MG/1
50 CAPSULE ORAL
Qty: 30 CAPSULE | Refills: 0 | Status: SHIPPED | OUTPATIENT
Start: 2023-07-30

## 2023-08-04 DIAGNOSIS — G47.00 INSOMNIA, UNSPECIFIED TYPE: ICD-10-CM

## 2023-08-04 RX ORDER — ZOLPIDEM TARTRATE 10 MG/1
10 TABLET ORAL
Qty: 30 TABLET | Refills: 0 | Status: SHIPPED | OUTPATIENT
Start: 2023-08-04

## 2023-08-19 DIAGNOSIS — G47.00 INSOMNIA, UNSPECIFIED TYPE: ICD-10-CM

## 2023-08-19 DIAGNOSIS — F41.9 ANXIETY: ICD-10-CM

## 2023-08-19 RX ORDER — LEVOCETIRIZINE DIHYDROCHLORIDE 5 MG/1
TABLET, FILM COATED ORAL
Qty: 30 TABLET | Refills: 0 | Status: SHIPPED | OUTPATIENT
Start: 2023-08-19

## 2023-08-28 DIAGNOSIS — G25.81 RESTLESS LEG SYNDROME: ICD-10-CM

## 2023-08-28 RX ORDER — PREGABALIN 50 MG/1
50 CAPSULE ORAL
Qty: 30 CAPSULE | Refills: 0 | Status: SHIPPED | OUTPATIENT
Start: 2023-08-28

## 2023-09-01 ENCOUNTER — HOSPITAL ENCOUNTER (OUTPATIENT)
Dept: RADIOLOGY | Age: 58
Discharge: HOME | End: 2023-09-01
Payer: COMMERCIAL

## 2023-09-01 VITALS — HEIGHT: 62 IN | WEIGHT: 129 LBS | BODY MASS INDEX: 23.74 KG/M2

## 2023-09-01 DIAGNOSIS — Z13.820 SCREENING FOR OSTEOPOROSIS: ICD-10-CM

## 2023-09-01 PROCEDURE — 77080 DXA BONE DENSITY AXIAL: CPT

## 2023-09-06 DIAGNOSIS — G47.00 INSOMNIA, UNSPECIFIED TYPE: ICD-10-CM

## 2023-09-07 RX ORDER — ZOLPIDEM TARTRATE 10 MG/1
10 TABLET ORAL
Qty: 30 TABLET | Refills: 0 | Status: SHIPPED | OUTPATIENT
Start: 2023-09-07

## 2023-09-15 DIAGNOSIS — G47.00 INSOMNIA, UNSPECIFIED TYPE: ICD-10-CM

## 2023-09-15 DIAGNOSIS — F41.9 ANXIETY: ICD-10-CM

## 2023-09-18 DIAGNOSIS — F32.A DEPRESSION, UNSPECIFIED DEPRESSION TYPE: ICD-10-CM

## 2023-09-18 DIAGNOSIS — G47.00 INSOMNIA, UNSPECIFIED TYPE: ICD-10-CM

## 2023-09-18 DIAGNOSIS — G25.81 RESTLESS LEG SYNDROME: ICD-10-CM

## 2023-09-18 DIAGNOSIS — F41.9 ANXIETY: ICD-10-CM

## 2023-09-18 RX ORDER — LEVOCETIRIZINE DIHYDROCHLORIDE 5 MG/1
TABLET, FILM COATED ORAL
Qty: 30 TABLET | Refills: 0 | Status: SHIPPED | OUTPATIENT
Start: 2023-09-18 | End: 2023-09-18 | Stop reason: SDUPTHER

## 2023-09-18 NOTE — TELEPHONE ENCOUNTER
Patient stated the pregabalin isn't really helping to much. Stated she's taking an OTC called "restul leg" she has been taking that seems to be helping a bit. Not sure if dose of Pregabalin should/could be increased? Has fup appt with PCP in a month.

## 2023-09-19 ENCOUNTER — HOSPITAL ENCOUNTER (OUTPATIENT)
Dept: RADIOLOGY | Facility: HOSPITAL | Age: 58
Discharge: HOME | End: 2023-09-19
Attending: ORTHOPAEDIC SURGERY
Payer: COMMERCIAL

## 2023-09-19 ENCOUNTER — OFFICE VISIT (OUTPATIENT)
Dept: OBGYN CLINIC | Facility: HOSPITAL | Age: 58
End: 2023-09-19
Payer: COMMERCIAL

## 2023-09-19 VITALS
SYSTOLIC BLOOD PRESSURE: 93 MMHG | BODY MASS INDEX: 24.29 KG/M2 | HEART RATE: 64 BPM | DIASTOLIC BLOOD PRESSURE: 55 MMHG | HEIGHT: 62 IN | WEIGHT: 132 LBS

## 2023-09-19 DIAGNOSIS — Z47.1 AFTERCARE FOLLOWING LEFT KNEE JOINT REPLACEMENT SURGERY: ICD-10-CM

## 2023-09-19 DIAGNOSIS — Z96.652 AFTERCARE FOLLOWING LEFT KNEE JOINT REPLACEMENT SURGERY: ICD-10-CM

## 2023-09-19 DIAGNOSIS — Z96.652 AFTERCARE FOLLOWING LEFT KNEE JOINT REPLACEMENT SURGERY: Primary | ICD-10-CM

## 2023-09-19 DIAGNOSIS — Z47.1 AFTERCARE FOLLOWING LEFT KNEE JOINT REPLACEMENT SURGERY: Primary | ICD-10-CM

## 2023-09-19 PROCEDURE — 73560 X-RAY EXAM OF KNEE 1 OR 2: CPT

## 2023-09-19 PROCEDURE — 99213 OFFICE O/P EST LOW 20 MIN: CPT | Performed by: ORTHOPAEDIC SURGERY

## 2023-09-19 NOTE — PROGRESS NOTES
Assessment/Plan:    Ms. Misael Bedoya is 9 months post op from a left total knee arthroplasty. She is doing well with regards to motion and strength. · Discussed need for prophylactic antibiotics until 2 years post op for routine dental care  · Activity as tolerated, continue to strength quadriceps muscles  · Return to care in 1 year for annual visit    Subjective:      Patient ID: Josie Frausto is a 62 y.o. female who presents for follow up evaluation of her left knee. She is 9 months post op from removal of proximal tibial screw and robotically assisted total knee arthroplasty. She feels well and offers no questions or concerns. She states that she has gotten used to the painless clicking sensation in her knee. Review of Systems    Negative unless otherwise stated above.     Past Medical History:   Diagnosis Date   • Alcoholism ([address]) 95/18    recovering alcoholic, drug addict/compulsive over-eater   • Anemia    • Anxiety    • Arthritis    • Callus    • Complete Tear of the Anterior cruciate ligament tear of the knee    • COPD (chronic obstructive pulmonary disease) (HCC)    • Depression    • Difficulty walking    • Exposure to hepatitis B     Resolved: 17   • History of cholesteatoma     right ear   • History of COVID-19 2022    mild s/s   • History of prior pregnancies     A1   • Irritable bowel syndrome    • Lung disease    • Papanicolaou smear 2010    NEG   • Plantar fasciitis    • PONV (postoperative nausea and vomiting)    • Reactive airway disease     Last assessed: 16       Past Surgical History:   Procedure Laterality Date   • BREAST CYST ASPIRATION Right    • COLONOSCOPY     • CYSTECTOMY Left     benign cystectomy below breast   • DILATION AND CURETTAGE OF UTERUS     • FACIAL RECONSTRUCTION SURGERY     • FOOT SURGERY Right     endoscopic plantar fasciotomy   • GUM SURGERY  2019    with bone graft   • KNEE ARTHROSCOPY Left 1997    menisectomy   • KNEE ARTHROSCOPY Left 10/1997    ACL repair   • MAMMO (HISTORICAL) Bilateral 2010    benign   • MYRINGOTOMY W/ TUBES Right 2019    Procedure: MYRINGOTOMY W/ INSERTION VENTILATION TUBE EAR;  Surgeon: Whitney Lipscomb DO;  [facility];  Service: ENT   • AK ARTHRP KNE CONDYLE&PLATU MEDIAL&LAT COMPARTMENTS Left 2022    Procedure: ARTHROPLASTY KNEE TOTAL W ROBOT with screw removal left proximal tibia;   Surgeon: Angus Do MD;  [facility];  Service: Orthopedics   • AK CORRECTION HAMMERTOE Right 2022    Procedure: REPAIR HAMMERTOE 2nd;  Surgeon: Charlene Talbot DPM;  Location: [address] [place] [address] MAIN OR;  Service: Podiatry   • AK SURGICAL ARTHROSCOPY SHOULDER W/ROTATOR CUFF RPR Left 2018    Procedure: SHOULDER ARTHROSCOPY ROTATOR CUFF REPAIR; SUBACROMIAL DECOMPRESSION;  Surgeon: Ann Colby MD;  [facility];  Service: Orthopedics   • AK TMPP MASTOIDECT NTC/RCNSTED WALL W/O OCR Right 2019    Procedure: TYMPANOPLASTY;  Surgeon: Whitney Lipscomb DO;  [facility];  Service: ENT   • SHOULDER SURGERY     • TONSILLECTOMY AND ADENOIDECTOMY     • TUBAL LIGATION     • VAGINAL DELIVERY     • WISDOM TOOTH EXTRACTION         Family History   Problem Relation Age of Onset   • Mental illness Mother    • Substance Abuse Mother    • Lung cancer Mother    • Cancer Mother            • Hyperlipidemia Mother    • Hypertension Father         Benign Essential   • Cataracts Father    • Diabetes Father            • Mental illness Father    • Hyperlipidemia Father    • Depression Father    • Cancer Father            • Schizophrenia Sister    • Squamous cell carcinoma Sister    • Diabetes Sister    • Hypertension Sister    • Mental illness Sister    • Cancer Sister    • Substance Abuse Sister    • Diabetes Brother    • Hypertension Brother    • Hyperlipidemia Brother    • Colon polyps Brother    • Diabetes Brother    • Hypertension Brother    • Diabetes Brother         possibly one or both   • No Known Problems Son    • Cancer Maternal Grandmother            • Diabetes Maternal Grandmother    • Heart failure Maternal Grandfather    • Lung cancer Paternal Grandmother    • Hyperlipidemia Paternal Grandmother    • Cancer Paternal Grandmother            • Hyperlipidemia Paternal Grandfather    • Pulmonary fibrosis Paternal Aunt    • Schizophrenia Paternal Aunt    • Diabetes Paternal Aunt    • Mental illness Paternal Aunt    • Mental illness Paternal Aunt        Social History       Objective:       Physical Exam      Left Knee Exam  Skin intact. No erythema. Well healed surgical incision. No tenderness to palpation. ROM 0-120 degrees  Stable to varus and valgus stress  Sensory and motor intact distally.    Warm and well perfused

## 2023-09-22 RX ORDER — LORAZEPAM 0.5 MG/1
0.5 TABLET ORAL
Qty: 30 TABLET | Refills: 0 | Status: SHIPPED | OUTPATIENT
Start: 2023-09-22

## 2023-09-22 RX ORDER — LEVOCETIRIZINE DIHYDROCHLORIDE 5 MG/1
5 TABLET, FILM COATED ORAL DAILY
Qty: 30 TABLET | Refills: 0 | Status: SHIPPED | OUTPATIENT
Start: 2023-09-22

## 2023-09-22 RX ORDER — ESCITALOPRAM OXALATE 10 MG/1
10 TABLET ORAL DAILY
Qty: 30 TABLET | Refills: 3 | Status: SHIPPED | OUTPATIENT
Start: 2023-09-22

## 2023-09-22 RX ORDER — PREGABALIN 50 MG/1
50 CAPSULE ORAL
Qty: 30 CAPSULE | Refills: 0 | Status: SHIPPED | OUTPATIENT
Start: 2023-09-22

## 2023-10-05 DIAGNOSIS — G47.00 INSOMNIA, UNSPECIFIED TYPE: ICD-10-CM

## 2023-10-05 RX ORDER — ZOLPIDEM TARTRATE 10 MG/1
10 TABLET ORAL
Qty: 30 TABLET | Refills: 0 | Status: SHIPPED | OUTPATIENT
Start: 2023-10-05

## 2023-10-11 ENCOUNTER — RA CDI HCC (OUTPATIENT)
Dept: OTHER | Facility: HOSPITAL | Age: 58
End: 2023-10-11

## 2023-10-11 NOTE — PROGRESS NOTES
[address] [facility] coding opportunities       Chart reviewed, no opportunity found: CHART REVIEWED, NO OPPORTUNITY FOUND        Patients Insurance        Commercial Insurance: Vasyl Kwon

## 2023-10-16 ENCOUNTER — OFFICE VISIT (OUTPATIENT)
Dept: INTERNAL MEDICINE CLINIC | Facility: CLINIC | Age: 58
End: 2023-10-16
Payer: COMMERCIAL

## 2023-10-16 VITALS
SYSTOLIC BLOOD PRESSURE: 122 MMHG | HEIGHT: 62 IN | WEIGHT: 133.4 LBS | BODY MASS INDEX: 24.55 KG/M2 | DIASTOLIC BLOOD PRESSURE: 64 MMHG | HEART RATE: 67 BPM | OXYGEN SATURATION: 97 % | RESPIRATION RATE: 16 BRPM

## 2023-10-16 DIAGNOSIS — Z23 ENCOUNTER FOR IMMUNIZATION: ICD-10-CM

## 2023-10-16 DIAGNOSIS — Z13.6 SCREENING FOR CARDIOVASCULAR CONDITION: ICD-10-CM

## 2023-10-16 DIAGNOSIS — F32.A DEPRESSION, UNSPECIFIED DEPRESSION TYPE: ICD-10-CM

## 2023-10-16 DIAGNOSIS — G56.03 BILATERAL CARPAL TUNNEL SYNDROME: Primary | ICD-10-CM

## 2023-10-16 DIAGNOSIS — G25.81 RESTLESS LEG SYNDROME: ICD-10-CM

## 2023-10-16 DIAGNOSIS — F41.9 ANXIETY: ICD-10-CM

## 2023-10-16 DIAGNOSIS — G47.00 INSOMNIA, UNSPECIFIED TYPE: ICD-10-CM

## 2023-10-16 PROBLEM — Q25.79: Status: ACTIVE | Noted: 2023-10-16

## 2023-10-16 PROCEDURE — 90471 IMMUNIZATION ADMIN: CPT

## 2023-10-16 PROCEDURE — 90677 PCV20 VACCINE IM: CPT

## 2023-10-16 PROCEDURE — 99214 OFFICE O/P EST MOD 30 MIN: CPT | Performed by: INTERNAL MEDICINE

## 2023-10-16 RX ORDER — ESCITALOPRAM OXALATE 10 MG/1
10 TABLET ORAL DAILY
Qty: 30 TABLET | Refills: 5 | Status: SHIPPED | OUTPATIENT
Start: 2023-10-16

## 2023-10-16 RX ORDER — LEVOCETIRIZINE DIHYDROCHLORIDE 5 MG/1
5 TABLET, FILM COATED ORAL DAILY
Qty: 30 TABLET | Refills: 5 | Status: SHIPPED | OUTPATIENT
Start: 2023-10-16

## 2023-10-16 NOTE — ASSESSMENT & PLAN NOTE
Low ferritin level increased Feosol to twice daily recheck CBC and ferritin level in 2 months symptoms are improving since total knee replacement back to usual baseline mild to consider Mirapex next visit

## 2023-10-16 NOTE — ASSESSMENT & PLAN NOTE
Mild carpal tunnel syndrome recommend wrist splints range of motion exercises if symptoms not improved by next visit to consider EMG

## 2023-10-16 NOTE — ASSESSMENT & PLAN NOTE
Clinically stable and doing well continue the current medical regiment will continue monitor.   Doing very well with the current dose of Lexapro 10 mg once daily

## 2023-10-16 NOTE — PROGRESS NOTES
Assessment/Plan:    Restless leg syndrome  Low ferritin level increased Feosol to twice daily recheck CBC and ferritin level in 2 months symptoms are improving since total knee replacement back to usual baseline mild to consider Mirapex next visit    Insomnia  Clinically stable and doing well continue the current medical regiment will continue monitor. Depression  Clinically stable and doing well continue the current medical regiment will continue monitor. Doing very well with the current dose of Lexapro 10 mg once daily    Bilateral carpal tunnel syndrome  Mild carpal tunnel syndrome recommend wrist splints range of motion exercises if symptoms not improved by next visit to consider EMG    Anxiety  Clinically stable and doing well continue the current medical regiment will continue monitor. Continue Lexapro         Problem List Items Addressed This Visit        Nervous and Auditory    Bilateral carpal tunnel syndrome - Primary     Mild carpal tunnel syndrome recommend wrist splints range of motion exercises if symptoms not improved by next visit to consider EMG         Relevant Orders    Cock Up Wrist Splint       Other    Depression     Clinically stable and doing well continue the current medical regiment will continue monitor.   Doing very well with the current dose of Lexapro 10 mg once daily         Relevant Medications    escitalopram (LEXAPRO) 10 mg tablet    Restless leg syndrome     Low ferritin level increased Feosol to twice daily recheck CBC and ferritin level in 2 months symptoms are improving since total knee replacement back to usual baseline mild to consider Mirapex next visit         Relevant Orders    CBC (Includes Diff/Plt) (Refl)    Iron Panel (Includes Ferritin, Iron Sat%, Iron, and TIBC)    Encounter for immunization    Relevant Orders    Pneumococcal Conjugate Vaccine 20-valent (Pcv20) (Completed)    Insomnia     Clinically stable and doing well continue the current medical regiment will continue monitor. Relevant Medications    levocetirizine (XYZAL) 5 MG tablet    Anxiety     Clinically stable and doing well continue the current medical regiment will continue monitor. Continue Lexapro         Relevant Medications    levocetirizine (XYZAL) 5 MG tablet   Other Visit Diagnoses     Screening for cardiovascular condition        Relevant Orders    Comprehensive metabolic panel    Lipid Panel with Direct LDL reflex          Return to office  6 months  call if any problems  Subjective:      Patient ID: Shakir Urbina is a 62 y.o. female. HPI 63-year old female coming in for a follow up office visit regarding restless leg syndrome, depression, anxiety, fibromyalgia; the patient reports me compliant taking medications without untoward side effects the. The patient is here to review his medical condition, update me on the medical condition and the patient reports me no hospitalizations and no ER visits. Eating more fatty foods, b-days, mild rls. Here to review laboratories in detail reports me restless leg syndrome has improved since total knee replacement currently on iron once daily ferritin is on the low end of normal.  Reports me improvements in anxiety and depression tolerating Lexapro well    The following portions of the patient's history were reviewed and updated as appropriate: allergies, current medications, past family history, past medical history, past social history, past surgical history and problem list.    Review of Systems   Constitutional:  Negative for activity change, appetite change and unexpected weight change. HENT:  Negative for congestion and postnasal drip. Eyes:  Negative for visual disturbance. Respiratory:  Negative for cough and shortness of breath. Cardiovascular:  Negative for chest pain. Gastrointestinal:  Negative for abdominal pain, diarrhea, nausea and vomiting. Neurological:  Positive for numbness.  Negative for dizziness, light-headedness and headaches. Hematological:  Negative for adenopathy. Psychiatric/Behavioral:  The patient is not nervous/anxious. Objective:    Return in about 6 months (around 2024). Procedure: XR knee 1 or 2 vw left    Result Date: 2023  Narrative: LEFT KNEE INDICATION:   Z47.1: Aftercare following joint replacement surgery Z96.652: Presence of left artificial knee joint. COMPARISON: Left knee x-ray dated 2023. VIEWS:  XR KNEE 1 OR 2 VW LEFT FINDINGS: There is no acute fracture or dislocation. There is no joint effusion. Unremarkable appearance of total knee arthroplasty. No evidence of hardware complication. No lytic or blastic osseous lesion. Soft tissues are unremarkable. Impression: Unremarkable appearance of total knee arthroplasty.  Workstation performed: [id number]        Allergies   Allergen Reactions   • Percocet [Oxycodone-Acetaminophen] GI Intolerance   • Metronidazole GI Intolerance       Past Medical History:   Diagnosis Date   • Alcoholism ([address]) 95/18    recovering alcoholic, drug addict/compulsive over-eater   • Anemia    • Anxiety    • Arthritis    • Callus    • Complete Tear of the Anterior cruciate ligament tear of the knee    • COPD (chronic obstructive pulmonary disease) (HCC)    • Depression    • Difficulty walking    • Exposure to hepatitis B     Resolved: 17   • History of cholesteatoma     right ear   • History of COVID-19 2022    mild s/s   • History of prior pregnancies     A1   • Irritable bowel syndrome    • Lung disease    • Papanicolaou smear 2010    NEG   • Plantar fasciitis    • PONV (postoperative nausea and vomiting)    • Reactive airway disease     Last assessed: 16     Past Surgical History:   Procedure Laterality Date   • BREAST CYST ASPIRATION Right    • COLONOSCOPY     • CYSTECTOMY Left     benign cystectomy below breast   • DILATION AND CURETTAGE OF UTERUS     • FACIAL RECONSTRUCTION SURGERY • FOOT SURGERY Right     endoscopic plantar fasciotomy   • GUM SURGERY  09/2019    with bone graft   • KNEE ARTHROSCOPY Left 05/1997    menisectomy   • KNEE ARTHROSCOPY Left 10/1997    ACL repair   • MAMMO (HISTORICAL) Bilateral 09/13/2010    benign   • MYRINGOTOMY W/ TUBES Right 03/01/2019    Procedure: MYRINGOTOMY W/ INSERTION VENTILATION TUBE EAR;  Surgeon: Janell Chandra DO;  Location: [facility];  Service: ENT   • HI ARTHRP KNE CONDYLE&PLATU MEDIAL&LAT COMPARTMENTS Left 12/14/2022    Procedure: ARTHROPLASTY KNEE TOTAL W ROBOT with screw removal left proximal tibia; Surgeon: Raza Rodriges MD;  Location: [facility];  Service: Orthopedics   • HI CORRECTION HAMMERTOE Right 02/04/2022    Procedure: REPAIR HAMMERTOE 2nd;  Surgeon: Oliver Kerr DPM;  Location: [address] [place] [address] MAIN OR;  Service: Podiatry   • HI SURGICAL ARTHROSCOPY SHOULDER W/ROTATOR CUFF RPR Left 01/31/2018    Procedure: SHOULDER ARTHROSCOPY ROTATOR CUFF REPAIR; SUBACROMIAL DECOMPRESSION;  Surgeon: Dilma Cifuentes MD;  Location: [facility] MAIN OR;  Service: Orthopedics   • HI TMPP MASTOIDECT NTC/RCNSTED WALL W/O OCR Right 03/01/2019    Procedure: TYMPANOPLASTY;  Surgeon: Janell Chandra DO;  Location: [facility];  Service: ENT   • SHOULDER SURGERY     • TONSILLECTOMY AND ADENOIDECTOMY     • TUBAL LIGATION     • VAGINAL DELIVERY  1987   • WISDOM TOOTH EXTRACTION       Current Outpatient Medications on File Prior to Visit   Medication Sig Dispense Refill   • albuterol (PROVENTIL HFA,VENTOLIN HFA) 90 mcg/act inhaler Inhale 2 puffs every 4 (four) hours as needed for wheezing 1 g 5   • Calcium Carbonate-Vit D-Min (Caltrate 600+D Plus Minerals) 600-800 MG-UNIT CHEW Chew 1 tablet daily     • dextromethorphan-guaifenesin (MUCINEX DM)  MG per 12 hr tablet in the morning     • ferrous sulfate 324 (65 Fe) mg Take one tablet daily.  30 tablet 0   • Homeopathic Products (RESTFUL LEGS PM SL)      • ipratropium (ATROVENT) 0.02 % nebulizer solution Take 0.5 mg by nebulization 2 (two) times a day     • Loperamide HCl (IMODIUM PO) Take 2 mg by mouth in the morning       • LORazepam (ATIVAN) 0.5 mg tablet Take 1 tablet (0.5 mg total) by mouth daily at bedtime as needed for anxiety 30 tablet 0   • MELATONIN PO Take 10 mg by mouth daily at bedtime     • Multiple Minerals-Vitamins (ROSLYN-MAG-ZINC-D PO) Take 1 tablet by mouth daily     • Multiple Vitamins-Minerals (multivitamin with minerals) tablet Take 1 tablet by mouth daily     • Personal Best Full Range SARAH Use as directed     • pregabalin (LYRICA) 50 mg capsule Take 1 capsule (50 mg total) by mouth daily at bedtime 30 capsule 0   • zolpidem (AMBIEN) 10 mg tablet Take 1 tablet (10 mg total) by mouth daily at bedtime as needed for sleep 30 tablet 0   • [DISCONTINUED] escitalopram (LEXAPRO) 10 mg tablet Take 1 tablet (10 mg total) by mouth daily 30 tablet 3   • [DISCONTINUED] levocetirizine (XYZAL) 5 MG tablet Take 1 tablet (5 mg total) by mouth daily 30 tablet 0     No current facility-administered medications on file prior to visit.      Family History   Problem Relation Age of Onset   • Mental illness Mother    • Substance Abuse Mother    • Lung cancer Mother    • Cancer Mother            • Hyperlipidemia Mother    • Hypertension Father         Benign Essential   • Cataracts Father    • Diabetes Father            • Mental illness Father    • Hyperlipidemia Father    • Depression Father    • Cancer Father            • Schizophrenia Sister    • Squamous cell carcinoma Sister    • Diabetes Sister    • Hypertension Sister    • Mental illness Sister    • Cancer Sister    • Substance Abuse Sister    • Diabetes Brother    • Hypertension Brother    • Hyperlipidemia Brother    • Colon polyps Brother    • Diabetes Brother    • Hypertension Brother    • Diabetes Brother         possibly one or both   • No Known Problems Son    • Cancer Maternal Grandmother            • Diabetes Maternal Grandmother    • Heart failure Maternal Grandfather    • Lung cancer Paternal Grandmother    • Hyperlipidemia Paternal Grandmother    • Cancer Paternal Grandmother            • Hyperlipidemia Paternal Grandfather    • Pulmonary fibrosis Paternal Aunt    • Schizophrenia Paternal Aunt    • Diabetes Paternal Aunt    • Mental illness Paternal Aunt    • Mental illness Paternal Aunt      Social History     Socioeconomic History   • Marital status: /Civil Union     Spouse name: Not on file   • Number of children: 1   • Years of education: Not on file   • Highest education level: Not on file   Occupational History   • Not on file   Tobacco Use   • Smoking status: Every Day     Packs/day: 0.75     Years: 40.00     Total pack years: 30.00     Types: Cigarettes     Start date: 1980   • Smokeless tobacco: Never   Vaping Use   • Vaping Use: Never used   Substance and Sexual Activity   • Alcohol use: Not Currently     Comment: Quit 23 years ago.  Recovering alcoholic   • Drug use: Not Currently     Types: Cocaine, "Crack" cocaine     Comment: clean since    • Sexual activity: Yes     Partners: Male     Birth control/protection: None   Other Topics Concern   • Not on file   Social History Narrative    Caffeine use    Daily coffee consumption     Social Determinants of Health     Financial Resource Strain: Not on file   Food Insecurity: Not on file   Transportation Needs: Not on file   Physical Activity: Not on file   Stress: Not on file   Social Connections: Not on file   Intimate Partner Violence: Not on file   Housing Stability: Not on file     Vitals:    10/16/23 0834   BP: 122/64   Pulse: 67   Resp: 16   SpO2: 97%   Weight: 60.5 kg (133 lb 6.4 oz)   Height: 5' 2.2" (1.58 m)     Results for orders placed or performed in visit on    Basic metabolic panel   Result Value Ref Range    Sodium 136 135 - 147 mmol/L    Potassium 4.2 3.5 - 5.3 mmol/L    Chloride 104 96 - 108 mmol/L    CO2 28 21 - 32 mmol/L    ANION GAP 4 mmol/L    BUN 19 5 - 25 mg/dL    Creatinine 0.73 0.60 - 1.30 mg/dL    Glucose, Fasting 95 65 - 99 mg/dL    Calcium 9.2 8.3 - 10.1 mg/dL    eGFR 91 ml/min/1.73sq m   Magnesium   Result Value Ref Range    Magnesium 2.2 1.6 - 2.6 mg/dL   CBC and differential   Result Value Ref Range    WBC 6.02 4.31 - 10.16 Thousand/uL    RBC 4.29 3.81 - 5.12 Million/uL    Hemoglobin 13.5 11.5 - 15.4 g/dL    Hematocrit 40.3 34.8 - 46.1 %    MCV 94 82 - 98 fL    MCH 31.5 26.8 - 34.3 pg    MCHC 33.5 31.4 - 37.4 g/dL    RDW 13.6 11.6 - 15.1 %    MPV 11.1 8.9 - 12.7 fL    Platelets 782 244 - 224 Thousands/uL    nRBC 0 /100 WBCs    Neutrophils Relative 59 43 - 75 %    Immat GRANS % 0 0 - 2 %    Lymphocytes Relative 26 14 - 44 %    Monocytes Relative 11 4 - 12 %    Eosinophils Relative 3 0 - 6 %    Basophils Relative 1 0 - 1 %    Neutrophils Absolute 3.60 1.85 - 7.62 Thousands/µL    Immature Grans Absolute 0.02 0.00 - 0.20 Thousand/uL    Lymphocytes Absolute 1.54 0.60 - 4.47 Thousands/µL    Monocytes Absolute 0.65 0.17 - 1.22 Thousand/µL    Eosinophils Absolute 0.15 0.00 - 0.61 Thousand/µL    Basophils Absolute 0.06 0.00 - 0.10 Thousands/µL   Iron Saturation %   Result Value Ref Range    Iron Saturation 37 15 - 50 %    TIBC 301 250 - 450 ug/dL    Iron 111 50 - 170 ug/dL   Ferritin   Result Value Ref Range    Ferritin 21 11 - 307 ng/mL     Weight (last 2 days)     Date/Time Weight    10/16/23 0834 60.5 (133.4)        Body mass index is 24.24 kg/m². BP      Temp      Pulse     Resp      SpO2        Vitals:    10/16/23 0834   Weight: 60.5 kg (133 lb 6.4 oz)     Vitals:    10/16/23 0834   Weight: 60.5 kg (133 lb 6.4 oz)       /64   Pulse 67   Resp 16   Ht 5' 2.2" (1.58 m)   Wt 60.5 kg (133 lb 6.4 oz)   LMP 04/20/2018 (Approximate)   SpO2 97%   BMI 24.24 kg/m²          Physical Exam  Vitals and nursing note reviewed. Constitutional:       General: She is not in acute distress. Appearance: Normal appearance.  She is well-developed. She is not ill-appearing, toxic-appearing or diaphoretic. HENT:      Head: Normocephalic. Eyes:      General: No scleral icterus. Right eye: No discharge. Left eye: No discharge. Conjunctiva/sclera: Conjunctivae normal.      Pupils: Pupils are equal, round, and reactive to light. Cardiovascular:      Rate and Rhythm: Normal rate and regular rhythm. Heart sounds: Normal heart sounds. No murmur heard. No friction rub. No gallop. Pulmonary:      Effort: No respiratory distress. Breath sounds: Normal breath sounds. No wheezing or rales. Abdominal:      General: Bowel sounds are normal. There is no distension. Palpations: Abdomen is soft. There is no mass. Tenderness: There is no abdominal tenderness. There is no guarding or rebound. Musculoskeletal:         General: No deformity. Cervical back: Neck supple. Lymphadenopathy:      Cervical: No cervical adenopathy. Neurological:      Mental Status: She is alert. Coordination: Coordination normal.   Psychiatric:         Mood and Affect: Mood is not anxious or depressed. Thought Content: Thought content does not include suicidal ideation.

## 2023-10-16 NOTE — ASSESSMENT & PLAN NOTE
Clinically stable and doing well continue the current medical regiment will continue monitor.   Continue Lexapro

## 2023-10-18 ENCOUNTER — TELEPHONE (OUTPATIENT)
Age: 58
End: 2023-10-18

## 2023-10-18 NOTE — TELEPHONE ENCOUNTER
Leslie Marin from [facility] stated the order for the cock-up wrist splint needs to state a quantity of 2 splints for the bilateral carpal tunnel diagnosis. They also need office notes faxed over in-regards to this. Patient is there at facility now and would like this done ASAP and requesting a call back once done.     Fax number is [phone]

## 2023-10-27 DIAGNOSIS — G25.81 RESTLESS LEG SYNDROME: ICD-10-CM

## 2023-10-27 RX ORDER — PREGABALIN 50 MG/1
50 CAPSULE ORAL
Qty: 30 CAPSULE | Refills: 0 | Status: SHIPPED | OUTPATIENT
Start: 2023-10-27

## 2023-11-06 DIAGNOSIS — G47.00 INSOMNIA, UNSPECIFIED TYPE: ICD-10-CM

## 2023-11-06 RX ORDER — ZOLPIDEM TARTRATE 10 MG/1
10 TABLET ORAL
Qty: 30 TABLET | Refills: 0 | Status: SHIPPED | OUTPATIENT
Start: 2023-11-06

## 2023-11-26 DIAGNOSIS — G25.81 RESTLESS LEG SYNDROME: ICD-10-CM

## 2023-11-27 RX ORDER — PREGABALIN 50 MG/1
50 CAPSULE ORAL
Qty: 30 CAPSULE | Refills: 0 | Status: SHIPPED | OUTPATIENT
Start: 2023-11-27

## 2023-11-29 ENCOUNTER — DOCUMENTATION (OUTPATIENT)
Dept: PSYCHIATRY | Facility: CLINIC | Age: 58
End: 2023-11-29
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
## 2023-12-03 DIAGNOSIS — G47.00 INSOMNIA, UNSPECIFIED TYPE: ICD-10-CM

## 2023-12-04 RX ORDER — ZOLPIDEM TARTRATE 10 MG/1
10 TABLET ORAL
Qty: 30 TABLET | Refills: 0 | Status: SHIPPED | OUTPATIENT
Start: 2023-12-04

## 2023-12-11 ENCOUNTER — APPOINTMENT (OUTPATIENT)
Dept: LAB | Age: 58
End: 2023-12-11
Payer: COMMERCIAL

## 2023-12-11 DIAGNOSIS — G25.81 RESTLESS LEG SYNDROME: ICD-10-CM

## 2023-12-11 LAB
BASOPHILS # BLD AUTO: 0.07 THOUSANDS/ÂΜL (ref 0–0.1)
BASOPHILS NFR BLD AUTO: 1 % (ref 0–1)
EOSINOPHIL # BLD AUTO: 0.15 THOUSAND/ÂΜL (ref 0–0.61)
EOSINOPHIL NFR BLD AUTO: 3 % (ref 0–6)
ERYTHROCYTE [DISTWIDTH] IN BLOOD BY AUTOMATED COUNT: 13.7 % (ref 11.6–15.1)
FERRITIN SERPL-MCNC: 20 NG/ML (ref 11–307)
HCT VFR BLD AUTO: 40 % (ref 34.8–46.1)
HGB BLD-MCNC: 13 G/DL (ref 11.5–15.4)
IMM GRANULOCYTES # BLD AUTO: 0.02 THOUSAND/UL (ref 0–0.2)
IMM GRANULOCYTES NFR BLD AUTO: 0 % (ref 0–2)
IRON SATN MFR SERPL: 34 % (ref 15–50)
IRON SERPL-MCNC: 115 UG/DL (ref 50–212)
LYMPHOCYTES # BLD AUTO: 1.2 THOUSANDS/ÂΜL (ref 0.6–4.47)
LYMPHOCYTES NFR BLD AUTO: 20 % (ref 14–44)
MCH RBC QN AUTO: 32.2 PG (ref 26.8–34.3)
MCHC RBC AUTO-ENTMCNC: 32.5 G/DL (ref 31.4–37.4)
MCV RBC AUTO: 99 FL (ref 82–98)
MONOCYTES # BLD AUTO: 0.85 THOUSAND/ÂΜL (ref 0.17–1.22)
MONOCYTES NFR BLD AUTO: 14 % (ref 4–12)
NEUTROPHILS # BLD AUTO: 3.76 THOUSANDS/ÂΜL (ref 1.85–7.62)
NEUTS SEG NFR BLD AUTO: 62 % (ref 43–75)
NRBC BLD AUTO-RTO: 0 /100 WBCS
PLATELET # BLD AUTO: 210 THOUSANDS/UL (ref 149–390)
PMV BLD AUTO: 11.2 FL (ref 8.9–12.7)
RBC # BLD AUTO: 4.04 MILLION/UL (ref 3.81–5.12)
TIBC SERPL-MCNC: 337 UG/DL (ref 250–450)
UIBC SERPL-MCNC: 222 UG/DL (ref 155–355)
WBC # BLD AUTO: 6.05 THOUSAND/UL (ref 4.31–10.16)

## 2023-12-11 PROCEDURE — 85025 COMPLETE CBC W/AUTO DIFF WBC: CPT

## 2023-12-11 PROCEDURE — 36415 COLL VENOUS BLD VENIPUNCTURE: CPT

## 2023-12-11 PROCEDURE — 83540 ASSAY OF IRON: CPT

## 2023-12-11 PROCEDURE — 83550 IRON BINDING TEST: CPT

## 2023-12-11 PROCEDURE — 82728 ASSAY OF FERRITIN: CPT

## 2023-12-26 DIAGNOSIS — G25.81 RESTLESS LEG SYNDROME: ICD-10-CM

## 2023-12-26 RX ORDER — PREGABALIN 50 MG/1
50 CAPSULE ORAL
Qty: 30 CAPSULE | Refills: 0 | Status: SHIPPED | OUTPATIENT
Start: 2023-12-26

## 2024-01-02 DIAGNOSIS — G47.00 INSOMNIA, UNSPECIFIED TYPE: ICD-10-CM

## 2024-01-02 DIAGNOSIS — F41.9 ANXIETY: ICD-10-CM

## 2024-01-03 RX ORDER — LORAZEPAM 0.5 MG/1
0.5 TABLET ORAL
Qty: 30 TABLET | Refills: 0 | Status: SHIPPED | OUTPATIENT
Start: 2024-01-03

## 2024-01-03 RX ORDER — ZOLPIDEM TARTRATE 10 MG/1
10 TABLET ORAL
Qty: 30 TABLET | Refills: 0 | Status: SHIPPED | OUTPATIENT
Start: 2024-01-03

## 2024-01-24 DIAGNOSIS — Z72.0 TOBACCO ABUSE: Primary | ICD-10-CM

## 2024-01-25 DIAGNOSIS — G25.81 RESTLESS LEG SYNDROME: ICD-10-CM

## 2024-01-25 RX ORDER — PREGABALIN 50 MG/1
50 CAPSULE ORAL
Qty: 30 CAPSULE | Refills: 0 | Status: SHIPPED | OUTPATIENT
Start: 2024-01-25

## 2024-02-01 ENCOUNTER — HOSPITAL ENCOUNTER (OUTPATIENT)
Dept: RADIOLOGY | Age: 59
Discharge: HOME | End: 2024-02-01
Payer: COMMERCIAL

## 2024-02-01 DIAGNOSIS — F41.9 ANXIETY: ICD-10-CM

## 2024-02-01 DIAGNOSIS — Z72.0 TOBACCO ABUSE: ICD-10-CM

## 2024-02-01 DIAGNOSIS — G47.00 INSOMNIA, UNSPECIFIED TYPE: ICD-10-CM

## 2024-02-01 PROCEDURE — 71271 CT THORAX LUNG CANCER SCR C-: CPT

## 2024-02-01 RX ORDER — LORAZEPAM 0.5 MG/1
0.5 TABLET ORAL
Qty: 30 TABLET | Refills: 0 | Status: SHIPPED | OUTPATIENT
Start: 2024-02-01

## 2024-02-01 RX ORDER — ZOLPIDEM TARTRATE 10 MG/1
10 TABLET ORAL
Qty: 30 TABLET | Refills: 0 | Status: SHIPPED | OUTPATIENT
Start: 2024-02-01

## 2024-02-24 DIAGNOSIS — G25.81 RESTLESS LEG SYNDROME: ICD-10-CM

## 2024-02-26 RX ORDER — PREGABALIN 50 MG/1
50 CAPSULE ORAL
Qty: 30 CAPSULE | Refills: 0 | Status: SHIPPED | OUTPATIENT
Start: 2024-02-26

## 2024-03-01 DIAGNOSIS — G47.00 INSOMNIA, UNSPECIFIED TYPE: ICD-10-CM

## 2024-03-01 RX ORDER — ZOLPIDEM TARTRATE 10 MG/1
10 TABLET ORAL
Qty: 30 TABLET | Refills: 0 | Status: SHIPPED | OUTPATIENT
Start: 2024-03-01

## 2024-03-01 NOTE — TELEPHONE ENCOUNTER
Zolpidem refill must be reviewed and completed by the office or provider. The refill is unable to be approved / refused by the medication management team.

## 2024-03-09 DIAGNOSIS — F41.9 ANXIETY: ICD-10-CM

## 2024-03-11 RX ORDER — LORAZEPAM 0.5 MG/1
0.5 TABLET ORAL
Qty: 30 TABLET | Refills: 0 | Status: SHIPPED | OUTPATIENT
Start: 2024-03-11

## 2024-03-11 NOTE — TELEPHONE ENCOUNTER
Patient called the RX Refill Line. Message is being forwarded to the office.     Patient is requesting for this refill to be sent to her pharmacy as soon as possible. She stated that she is leaving to go out of town Wednesday morning and would like to  from her pharmacy on Tuesday    Please contact patient at [phone]

## 2024-03-25 DIAGNOSIS — G25.81 RESTLESS LEG SYNDROME: ICD-10-CM

## 2024-03-25 RX ORDER — PREGABALIN 50 MG/1
CAPSULE ORAL
Qty: 30 CAPSULE | Refills: 0 | Status: SHIPPED | OUTPATIENT
Start: 2024-03-25

## 2024-04-01 DIAGNOSIS — G47.00 INSOMNIA, UNSPECIFIED TYPE: ICD-10-CM

## 2024-04-02 ENCOUNTER — TELEPHONE (OUTPATIENT)
Age: 59
End: 2024-04-02

## 2024-04-02 RX ORDER — ZOLPIDEM TARTRATE 10 MG/1
10 TABLET ORAL
Qty: 30 TABLET | Refills: 0 | Status: SHIPPED | OUTPATIENT
Start: 2024-04-02

## 2024-04-08 ENCOUNTER — TELEPHONE (OUTPATIENT)
Dept: OBGYN CLINIC | Facility: HOSPITAL | Age: 59
End: 2024-04-08

## 2024-04-08 NOTE — TELEPHONE ENCOUNTER
Caller: Patient    Doctor: Shayla    Reason for call: Patient had gotten insoles in the office a few years ago and wanted to get a few more pairs.  Can we advise her to get OTC or if we have them to dispense in the office.     Call back#: [phone] Avail after 2pm

## 2024-04-09 NOTE — TELEPHONE ENCOUNTER
I called and left a message informing pt of the cost of the insoles and she can come during office hours.

## 2024-04-09 NOTE — TELEPHONE ENCOUNTER
Caller: Patient     Doctor: Shayla     Reason for call: Patient wanting to come into office to get insoles she states she's a 9 in a [age]   Please advise     Call back#: [phone]

## 2024-04-10 ENCOUNTER — APPOINTMENT (OUTPATIENT)
Dept: LAB | Age: 59
End: 2024-04-10
Payer: COMMERCIAL

## 2024-04-10 DIAGNOSIS — Z13.6 SCREENING FOR CARDIOVASCULAR CONDITION: ICD-10-CM

## 2024-04-10 LAB
ALBUMIN SERPL BCP-MCNC: 4.2 G/DL (ref 3.5–5)
ALP SERPL-CCNC: 52 U/L (ref 34–104)
ALT SERPL W P-5'-P-CCNC: 18 U/L (ref 7–52)
ANION GAP SERPL CALCULATED.3IONS-SCNC: 6 MMOL/L (ref 4–13)
AST SERPL W P-5'-P-CCNC: 23 U/L (ref 13–39)
BILIRUB SERPL-MCNC: 0.36 MG/DL (ref 0.2–1)
BUN SERPL-MCNC: 19 MG/DL (ref 5–25)
CALCIUM SERPL-MCNC: 9 MG/DL (ref 8.4–10.2)
CHLORIDE SERPL-SCNC: 101 MMOL/L (ref 96–108)
CHOLEST SERPL-MCNC: 170 MG/DL
CO2 SERPL-SCNC: 31 MMOL/L (ref 21–32)
CREAT SERPL-MCNC: 0.65 MG/DL (ref 0.6–1.3)
GFR SERPL CREATININE-BSD FRML MDRD: 98 ML/MIN/1.73SQ M
GLUCOSE P FAST SERPL-MCNC: 76 MG/DL (ref 65–99)
HDLC SERPL-MCNC: 70 MG/DL
LDLC SERPL CALC-MCNC: 90 MG/DL (ref 0–100)
POTASSIUM SERPL-SCNC: 4.1 MMOL/L (ref 3.5–5.3)
PROT SERPL-MCNC: 6.6 G/DL (ref 6.4–8.4)
SODIUM SERPL-SCNC: 138 MMOL/L (ref 135–147)
TRIGL SERPL-MCNC: 52 MG/DL

## 2024-04-10 PROCEDURE — 80061 LIPID PANEL: CPT

## 2024-04-10 PROCEDURE — 80053 COMPREHEN METABOLIC PANEL: CPT

## 2024-04-10 PROCEDURE — 36415 COLL VENOUS BLD VENIPUNCTURE: CPT

## 2024-04-13 DIAGNOSIS — F41.9 ANXIETY: ICD-10-CM

## 2024-04-13 DIAGNOSIS — G47.00 INSOMNIA, UNSPECIFIED TYPE: ICD-10-CM

## 2024-04-13 RX ORDER — LEVOCETIRIZINE DIHYDROCHLORIDE 5 MG/1
5 TABLET, FILM COATED ORAL DAILY
Qty: 30 TABLET | Refills: 5 | Status: SHIPPED | OUTPATIENT
Start: 2024-04-13

## 2024-04-24 DIAGNOSIS — G25.81 RESTLESS LEG SYNDROME: ICD-10-CM

## 2024-04-24 RX ORDER — PREGABALIN 50 MG/1
CAPSULE ORAL
Qty: 30 CAPSULE | Refills: 0 | Status: SHIPPED | OUTPATIENT
Start: 2024-04-24

## 2024-04-24 NOTE — TELEPHONE ENCOUNTER
Refill must be reviewed and completed by the office or provider. The refill is unable to be approved or denied by the medication management team.    Cannot be delegated

## 2024-04-25 DIAGNOSIS — G47.00 INSOMNIA, UNSPECIFIED TYPE: ICD-10-CM

## 2024-04-25 RX ORDER — ZOLPIDEM TARTRATE 10 MG/1
10 TABLET ORAL
Qty: 30 TABLET | Refills: 0 | Status: SHIPPED | OUTPATIENT
Start: 2024-04-25

## 2024-04-29 ENCOUNTER — OFFICE VISIT (OUTPATIENT)
Dept: INTERNAL MEDICINE CLINIC | Facility: CLINIC | Age: 59
End: 2024-04-29
Payer: COMMERCIAL

## 2024-04-29 ENCOUNTER — RA CDI HCC (OUTPATIENT)
Dept: OTHER | Facility: HOSPITAL | Age: 59
End: 2024-04-29

## 2024-04-29 VITALS
HEART RATE: 68 BPM | RESPIRATION RATE: 16 BRPM | WEIGHT: 135.2 LBS | BODY MASS INDEX: 24.88 KG/M2 | DIASTOLIC BLOOD PRESSURE: 68 MMHG | OXYGEN SATURATION: 97 % | HEIGHT: 62 IN | SYSTOLIC BLOOD PRESSURE: 120 MMHG

## 2024-04-29 DIAGNOSIS — M79.7 FIBROMYALGIA: ICD-10-CM

## 2024-04-29 DIAGNOSIS — Z13.6 SCREENING FOR CARDIOVASCULAR CONDITION: ICD-10-CM

## 2024-04-29 DIAGNOSIS — Z00.00 WELLNESS EXAMINATION: ICD-10-CM

## 2024-04-29 DIAGNOSIS — F41.9 ANXIETY: ICD-10-CM

## 2024-04-29 DIAGNOSIS — F41.9 ANXIETY DISORDER, UNSPECIFIED TYPE: Primary | ICD-10-CM

## 2024-04-29 DIAGNOSIS — J42 CHRONIC BRONCHITIS, UNSPECIFIED CHRONIC BRONCHITIS TYPE (HCC): ICD-10-CM

## 2024-04-29 DIAGNOSIS — M76.31 ILIOTIBIAL BAND SYNDROME OF RIGHT SIDE: ICD-10-CM

## 2024-04-29 PROBLEM — Q04.8: Status: RESOLVED | Noted: 2019-01-08 | Resolved: 2024-04-29

## 2024-04-29 PROBLEM — Q25.79: Status: RESOLVED | Noted: 2023-10-16 | Resolved: 2024-04-29

## 2024-04-29 PROCEDURE — 3725F SCREEN DEPRESSION PERFORMED: CPT | Performed by: INTERNAL MEDICINE

## 2024-04-29 PROCEDURE — 99396 PREV VISIT EST AGE 40-64: CPT | Performed by: INTERNAL MEDICINE

## 2024-04-29 PROCEDURE — 99214 OFFICE O/P EST MOD 30 MIN: CPT | Performed by: INTERNAL MEDICINE

## 2024-04-29 RX ORDER — CELECOXIB 100 MG/1
100 CAPSULE ORAL DAILY PRN
Qty: 30 CAPSULE | Refills: 2 | Status: SHIPPED | OUTPATIENT
Start: 2024-04-29

## 2024-04-29 RX ORDER — LORAZEPAM 0.5 MG/1
0.5 TABLET ORAL
Qty: 30 TABLET | Refills: 0 | Status: SHIPPED | OUTPATIENT
Start: 2024-04-29

## 2024-04-29 RX ORDER — BUPROPION HYDROCHLORIDE 150 MG/1
150 TABLET ORAL EVERY MORNING
Qty: 30 TABLET | Refills: 5 | Status: SHIPPED | OUTPATIENT
Start: 2024-04-29 | End: 2024-05-29

## 2024-04-29 RX ORDER — GUAIFENESIN 600 MG/1
1200 TABLET, EXTENDED RELEASE ORAL EVERY 12 HOURS SCHEDULED
Start: 2024-04-29

## 2024-04-29 NOTE — PROGRESS NOTES
ADULT ANNUAL PHYSICAL  [facility] - [facility]    NAME: Korina Sparrow  AGE: 58 y.o. SEX: female  : 1965     DATE: 2024     Assessment and Plan:     Problem List Items Addressed This Visit        Respiratory    COPD (chronic obstructive pulmonary disease) (HCC)     Clinically stable and doing well continue the current medical regiment will continue monitor.  Discontinue Mucinex dm changed to Mucinex 600 mg twice daily as needed does not interact with Wellbutrin         Relevant Medications    guaiFENesin (MUCINEX) 600 mg 12 hr tablet       Musculoskeletal and Integument    Iliotibial band syndrome of right side     Stretching exercises advised Rx for Celebrex 100 mg once daily as needed patient would like to hold off on imaging at this point we will observe if there is any change or worsening she will notify us         Relevant Medications    celecoxib (CeleBREX) 100 mg capsule       Behavioral Health    Anxiety disorder - Primary    Relevant Medications    buPROPion (WELLBUTRIN XL) 150 mg 24 hr tablet    LORazepam (ATIVAN) 0.5 mg tablet    Anxiety     Mild anxiety and depression patient reports me possible weight gain from the Lexapro will have patient wean off Lexapro 5 mg once a day for 1 week then discontinue start Wellbutrin  mg once daily reviewed the risk benefits and side effects of medication no previous history of seizure no family history of seizure I did explain to the patient risk of 1 in 1000 risk of seizure         Relevant Medications    LORazepam (ATIVAN) 0.5 mg tablet       Surgery/Wound/Pain    Fibromyalgia     Clinically stable and doing well continue the current medical regiment will continue monitor.  Routine stretching            Other    Wellness examination     Assessment and plan 1.  Health maintenance annual wellness examination overall the patient is clinically stable and doing well, we encouraged the patient to follow a  healthy and balanced diet.  We recommend that the patient exercise routinely approximately 30 minutes 5 times per week .  We have reviewed the patient's vaccines and have made recommendations for updates if necessary annual flu shot     .  We will be ordering screening laboratories which are age appropriate.  Return to the office in   6 months   call if any problems.        Other Visit Diagnoses     Screening for cardiovascular condition        Relevant Orders    Lipid Panel with Direct LDL reflex    Comprehensive metabolic panel      RTO in 2 months follow-up regarding starting Wellbutrin call if any side effects or any problems after starting the medication      Immunizations and preventive care screenings were discussed with patient today. Appropriate education was printed on patient's after visit summary.    Counseling:  Exercise: the importance of regular exercise/physical activity was discussed. Recommend exercise 3-5 times per week for at least 30 minutes.       Depression Screening and Follow-up Plan: Patient was screened for depression during today's encounter. They screened negative with a PHQ-9 score of 3.        Return in about 2 months (around 6/29/2024).     Chief Complaint:     Chief Complaint   Patient presents with   • Annual Exam      History of Present Illness:     Adult Annual Physical   Patient here for a comprehensive physical exam.  58-year old female coming in for a follow up office visit regarding anxiety/depression, iliotibial band syndrome right side, COPD, anxiety, fibromyalgia, weight gain; the patient reports me compliant taking medications without untoward side effects the.  The patient is here to review his medical condition, update me on the medical condition and the patient reports me no hospitalizations and no ER visits.  No injuries no illnesses she reports me stress related to working long hours with her job things will be settling down in the near future and she will be going  for vacation in the near future.  She reports to me that she does stress eat.  She reports me forgetting to wear her nightguard she developed jaw pain.  Also reports me forgot to wear her wrist splint she developed wrist pain.  She is a massage therapist.  She reported to me symptoms of mild anxiety and depression no suicidal ideation she reports me she has noticed weight gain with Lexapro and would like to try a different medication left hand numbness,  uses  for left jaw pain (Bruxism)    Diet and Physical Activity  Diet/Nutrition: well balanced diet.   Exercise: walking.      Depression Screening  PHQ-2/9 Depression Screening    Little interest or pleasure in doing things: 0 - not at all  Feeling down, depressed, or hopeless: 0 - not at all  Trouble falling or staying asleep, or sleeping too much: 1 - several days  Feeling tired or having little energy: 0 - not at all  Poor appetite or overeatin - several days  Feeling bad about yourself - or that you are a failure or have let yourself or your family down: 1 - several days  Trouble concentrating on things, such as reading the newspaper or watching television: 0 - not at all  Moving or speaking so slowly that other people could have noticed. Or the opposite - being so fidgety or restless that you have been moving around a lot more than usual: 0 - not at all  Thoughts that you would be better off dead, or of hurting yourself in some way: 0 - not at all  PHQ-9 Score: 3  PHQ-9 Interpretation: No or Minimal depression     Weight related, stress eating , stressful couple months eating out  General Health  Sleep: sleeps well.   Hearing: normal - bilateral.  Vision: no vision problems.   Dental: regular dental visits.       /GYN Health  Follows with gynecology? no   Patient is: postmenopausal  Last menstrual period: 53  Contraceptive method:     Advanced Care Planning  Do you have an advanced directive? yes  Do you have a durable medical power of  ? yes  ACP document given to the patient? no     Review of Systems:     Review of Systems   Constitutional:  Negative for activity change, appetite change and unexpected weight change.        Weight gain from SSRI   HENT:  Negative for congestion and postnasal drip.    Eyes:  Negative for visual disturbance.   Respiratory:  Negative for cough and shortness of breath.    Cardiovascular:  Negative for chest pain.   Gastrointestinal:  Negative for abdominal pain, diarrhea, nausea and vomiting.   Neurological:  Negative for dizziness, light-headedness and headaches.   Hematological:  Negative for adenopathy.   Psychiatric/Behavioral:  Negative for suicidal ideas. The patient is nervous/anxious.       Past Medical History:     Past Medical History:   Diagnosis Date   • Alcoholism (Formerly [facility]) 95/18    recovering alcoholic, drug addict/compulsive over-eater   • Anemia    • Anxiety    • Arthritis    • Callus    • Complete Tear of the Anterior cruciate ligament tear of the knee    • COPD (chronic obstructive pulmonary disease) ([facility])    • Depression    • Difficulty walking    • Exposure to hepatitis B     Resolved: 17   • History of cholesteatoma     right ear   • History of COVID-19 2022    mild s/s   • History of prior pregnancies     A1   • Irritable bowel syndrome    • Lung disease    • Papanicolaou smear 2010    NEG   • Plantar fasciitis    • PONV (postoperative nausea and vomiting)    • Reactive airway disease     Last assessed: 16      Past Surgical History:     Past Surgical History:   Procedure Laterality Date   • BREAST CYST ASPIRATION Right    • COLONOSCOPY     • CYSTECTOMY Left     benign cystectomy below breast   • DILATION AND CURETTAGE OF UTERUS     • FACIAL RECONSTRUCTION SURGERY     • FOOT SURGERY Right     endoscopic plantar fasciotomy   • GUM SURGERY  2019    with bone graft   • KNEE ARTHROSCOPY Left 1997    menisectomy   • KNEE ARTHROSCOPY Left 10/1997    ACL  "repair   • MAMMO (HISTORICAL) Bilateral 09/13/2010    benign   • MYRINGOTOMY W/ TUBES Right 03/01/2019    Procedure: MYRINGOTOMY W/ INSERTION VENTILATION TUBE EAR;  Surgeon: Cain Matias DO;  Location: [facility];  Service: ENT   • NJ ARTHRP KNE CONDYLE&PLATU MEDIAL&LAT COMPARTMENTS Left 12/14/2022    Procedure: ARTHROPLASTY KNEE TOTAL W ROBOT with screw removal left proximal tibia;  Surgeon: Cooper Chapin MD;  Location: [facility];  Service: Orthopedics   • NJ CORRECTION HAMMERTOE Right 02/04/2022    Procedure: REPAIR HAMMERTOE 2nd;  Surgeon: Gavino Mcguire DPM;  Location:  MAIN OR;  Service: Podiatry   • NJ SURGICAL ARTHROSCOPY SHOULDER W/ROTATOR CUFF RPR Left 01/31/2018    Procedure: SHOULDER ARTHROSCOPY ROTATOR CUFF REPAIR; SUBACROMIAL DECOMPRESSION;  Surgeon: Michel Peoples MD;  Location: [facility];  Service: Orthopedics   • NJ TMPP MASTOIDECT NTC/RCNSTED WALL W/O OCR Right 03/01/2019    Procedure: TYMPANOPLASTY;  Surgeon: Cain Matias DO;  Location[facility];  Service: ENT   • SHOULDER SURGERY     • TONSILLECTOMY AND ADENOIDECTOMY     • TUBAL LIGATION     • VAGINAL DELIVERY  1987   • WISDOM TOOTH EXTRACTION        Social History:     Social History     Socioeconomic History   • Marital status: /Civil Union     Spouse name: None   • Number of children: 1   • Years of education: None   • Highest education level: None   Occupational History   • None   Tobacco Use   • Smoking status: Every Day     Current packs/day: 0.75     Average packs/day: 0.8 packs/day for 44.3 years (33.2 ttl pk-yrs)     Types: Cigarettes     Start date: 1/1/1980   • Smokeless tobacco: Never   Vaping Use   • Vaping status: Never Used   Substance and Sexual Activity   • Alcohol use: Not Currently     Comment: Quit 23 years ago. Recovering alcoholic   • Drug use: Not Currently     Types: Cocaine, \"Crack\" cocaine     Comment: clean since 1995   • Sexual activity: Yes     Partners: Male     Birth " control/protection: None   Other Topics Concern   • None   Social History Narrative    Caffeine use    Daily coffee consumption     Social Determinants of Health     Financial Resource Strain: Not on file   Food Insecurity: Not on file   Transportation Needs: Not on file   Physical Activity: Not on file   Stress: Not on file   Social Connections: Not on file   Intimate Partner Violence: Not on file   Housing Stability: Not on file      Family History:     Family History   Problem Relation Age of Onset   • Mental illness Mother    • Substance Abuse Mother    • Lung cancer Mother    • Cancer Mother            • Hyperlipidemia Mother    • Hypertension Father         Benign Essential   • Cataracts Father    • Diabetes Father            • Mental illness Father    • Hyperlipidemia Father    • Depression Father    • Cancer Father            • Schizophrenia Sister    • Squamous cell carcinoma Sister    • Diabetes Sister    • Hypertension Sister    • Mental illness Sister    • Cancer Sister    • Substance Abuse Sister    • Diabetes Brother    • Hypertension Brother    • Hyperlipidemia Brother    • Colon polyps Brother    • Diabetes Brother    • Hypertension Brother    • Diabetes Brother         possibly one or both   • No Known Problems Son    • Cancer Maternal Grandmother            • Diabetes Maternal Grandmother    • Heart failure Maternal Grandfather    • Lung cancer Paternal Grandmother    • Hyperlipidemia Paternal Grandmother    • Cancer Paternal Grandmother            • Hyperlipidemia Paternal Grandfather    • Pulmonary fibrosis Paternal Aunt    • Schizophrenia Paternal Aunt    • Diabetes Paternal Aunt    • Mental illness Paternal Aunt    • Mental illness Paternal Aunt       Current Medications:     Current Outpatient Medications   Medication Sig Dispense Refill   • albuterol (PROVENTIL HFA,VENTOLIN HFA) 90 mcg/act inhaler Inhale 2 puffs every 4 (four) hours as needed for  "wheezing 1 g 5   • buPROPion (WELLBUTRIN XL) 150 mg 24 hr tablet Take 1 tablet (150 mg total) by mouth every morning 30 tablet 5   • Calcium Carbonate-Vit D-Min (Caltrate 600+D Plus Minerals) 600-800 MG-UNIT CHEW Chew 1 tablet daily     • celecoxib (CeleBREX) 100 mg capsule Take 1 capsule (100 mg total) by mouth daily as needed for mild pain 30 capsule 2   • ferrous sulfate 324 (65 Fe) mg Take one tablet daily. 30 tablet 0   • guaiFENesin (MUCINEX) 600 mg 12 hr tablet Take 2 tablets (1,200 mg total) by mouth every 12 (twelve) hours     • Homeopathic Products (RESTFUL LEGS PM SL)      • ipratropium (ATROVENT) 0.02 % nebulizer solution Take 0.5 mg by nebulization 2 (two) times a day     • levocetirizine (XYZAL) 5 MG tablet TAKE ONE TABLET BY MOUTH EVERY DAY 30 tablet 5   • Loperamide HCl (IMODIUM PO) Take 2 mg by mouth in the morning       • LORazepam (ATIVAN) 0.5 mg tablet Take 1 tablet (0.5 mg total) by mouth daily at bedtime as needed for anxiety 30 tablet 0   • MELATONIN PO Take 10 mg by mouth daily at bedtime     • Multiple Minerals-Vitamins (ROSLYN-MAG-ZINC-D PO) Take 1 tablet by mouth daily     • Multiple Vitamins-Minerals (multivitamin with minerals) tablet Take 1 tablet by mouth daily     • Personal Best Full Range SARAH Use as directed     • pregabalin (LYRICA) 50 mg capsule TAKE ONE CAPSULE BY MOUTH EVERY EVENING AT BEDTIME 30 capsule 0   • zolpidem (AMBIEN) 10 mg tablet Take 1 tablet (10 mg total) by mouth daily at bedtime as needed for sleep 30 tablet 0     No current facility-administered medications for this visit.      Allergies:     Allergies   Allergen Reactions   • Percocet [Oxycodone-Acetaminophen] GI Intolerance   • Metronidazole GI Intolerance      Physical Exam:     /68   Pulse 68   Resp 16   Ht 5' 2.2\" (1.58 m)   Wt 61.3 kg (135 lb 3.2 oz)   LMP 04/20/2018 (Approximate)   SpO2 97%   BMI 24.57 kg/m²     Physical Exam  Vitals and nursing note reviewed.   Constitutional:       General: " She is not in acute distress.     Appearance: Normal appearance. She is well-developed. She is not ill-appearing, toxic-appearing or diaphoretic.   HENT:      Head: Normocephalic and atraumatic.      Right Ear: External ear normal.      Left Ear: External ear normal.      Nose: Nose normal.   Eyes:      Pupils: Pupils are equal, round, and reactive to light.   Cardiovascular:      Rate and Rhythm: Normal rate and regular rhythm.      Heart sounds: Normal heart sounds. No murmur heard.  Pulmonary:      Effort: Pulmonary effort is normal.      Breath sounds: Normal breath sounds.   Abdominal:      General: There is no distension.      Palpations: Abdomen is soft.      Tenderness: There is no abdominal tenderness. There is no guarding.   Neurological:      Mental Status: She is alert.   Psychiatric:         Mood and Affect: Mood is anxious. Mood is not depressed.         Thought Content: Thought content does not include suicidal ideation.          Osman Cedillo DO  [facility]

## 2024-04-29 NOTE — ASSESSMENT & PLAN NOTE
Assessment and plan 1.  Health maintenance annual wellness examination overall the patient is clinically stable and doing well, we encouraged the patient to follow a healthy and balanced diet.  We recommend that the patient exercise routinely approximately 30 minutes 5 times per week .  We have reviewed the patient's vaccines and have made recommendations for updates if necessary annual flu shot     .  We will be ordering screening laboratories which are age appropriate.  Return to the office in   6 months   call if any problems.

## 2024-04-29 NOTE — ASSESSMENT & PLAN NOTE
Mild anxiety and depression patient reports me possible weight gain from the Lexapro will have patient wean off Lexapro 5 mg once a day for 1 week then discontinue start Wellbutrin  mg once daily reviewed the risk benefits and side effects of medication no previous history of seizure no family history of seizure I did explain to the patient risk of 1 in 1000 risk of seizure

## 2024-04-29 NOTE — PROGRESS NOTES
HCC coding opportunities       Chart Reviewed number of suggestions sent to Provider: 1     Patients Insurance        Commercial Insurance: [employer] Commercial [employer]       J45.20: Mild intermittent reactive airway disease without complication     Last assessed on 1/17/2023 - please review and assess and document per MEAT if applicable for 2024

## 2024-04-29 NOTE — ASSESSMENT & PLAN NOTE
Clinically stable and doing well continue the current medical regiment will continue monitor.  Discontinue Mucinex dm changed to Mucinex 600 mg twice daily as needed does not interact with Wellbutrin

## 2024-04-29 NOTE — ASSESSMENT & PLAN NOTE
Stretching exercises advised Rx for Celebrex 100 mg once daily as needed patient would like to hold off on imaging at this point we will observe if there is any change or worsening she will notify us

## 2024-04-29 NOTE — ASSESSMENT & PLAN NOTE
Clinically stable and doing well continue the current medical regiment will continue monitor.  Routine stretching

## 2024-05-24 DIAGNOSIS — G25.81 RESTLESS LEG SYNDROME: ICD-10-CM

## 2024-05-24 RX ORDER — PREGABALIN 50 MG/1
CAPSULE ORAL
Qty: 30 CAPSULE | Refills: 0 | Status: SHIPPED | OUTPATIENT
Start: 2024-05-24

## 2024-05-29 DIAGNOSIS — G47.00 INSOMNIA, UNSPECIFIED TYPE: ICD-10-CM

## 2024-05-30 RX ORDER — ZOLPIDEM TARTRATE 10 MG/1
10 TABLET ORAL
Qty: 30 TABLET | Refills: 0 | Status: SHIPPED | OUTPATIENT
Start: 2024-05-30

## 2024-06-23 DIAGNOSIS — G25.81 RESTLESS LEG SYNDROME: ICD-10-CM

## 2024-06-24 RX ORDER — PREGABALIN 50 MG/1
50 CAPSULE ORAL
Qty: 30 CAPSULE | Refills: 0 | Status: SHIPPED | OUTPATIENT
Start: 2024-06-24

## 2024-06-27 DIAGNOSIS — G47.00 INSOMNIA, UNSPECIFIED TYPE: ICD-10-CM

## 2024-06-27 RX ORDER — ZOLPIDEM TARTRATE 10 MG/1
10 TABLET ORAL
Qty: 30 TABLET | Refills: 0 | Status: SHIPPED | OUTPATIENT
Start: 2024-06-27

## 2024-06-27 NOTE — TELEPHONE ENCOUNTER
Refill must be reviewed and completed by the office or provider. The refill is unable to be approved or denied by the medication management team.       This refill cannot be delegated

## 2024-07-20 DIAGNOSIS — G25.81 RESTLESS LEG SYNDROME: ICD-10-CM

## 2024-07-22 DIAGNOSIS — F41.9 ANXIETY: ICD-10-CM

## 2024-07-22 RX ORDER — PREGABALIN 50 MG/1
50 CAPSULE ORAL
Qty: 30 CAPSULE | Refills: 0 | Status: SHIPPED | OUTPATIENT
Start: 2024-07-22

## 2024-07-23 RX ORDER — LORAZEPAM 0.5 MG/1
0.5 TABLET ORAL
Qty: 30 TABLET | Refills: 0 | Status: SHIPPED | OUTPATIENT
Start: 2024-07-23

## 2024-07-29 DIAGNOSIS — G47.00 INSOMNIA, UNSPECIFIED TYPE: ICD-10-CM

## 2024-07-29 RX ORDER — ZOLPIDEM TARTRATE 10 MG/1
10 TABLET ORAL
Qty: 30 TABLET | Refills: 0 | Status: SHIPPED | OUTPATIENT
Start: 2024-07-29

## 2024-08-19 DIAGNOSIS — G25.81 RESTLESS LEG SYNDROME: ICD-10-CM

## 2024-08-20 RX ORDER — PREGABALIN 50 MG/1
50 CAPSULE ORAL
Qty: 30 CAPSULE | Refills: 0 | Status: SHIPPED | OUTPATIENT
Start: 2024-08-20

## 2024-08-28 DIAGNOSIS — G47.00 INSOMNIA, UNSPECIFIED TYPE: ICD-10-CM

## 2024-08-28 RX ORDER — ZOLPIDEM TARTRATE 10 MG/1
10 TABLET ORAL
Qty: 30 TABLET | Refills: 0 | Status: SHIPPED | OUTPATIENT
Start: 2024-08-28

## 2024-09-21 DIAGNOSIS — G25.81 RESTLESS LEG SYNDROME: ICD-10-CM

## 2024-09-21 DIAGNOSIS — Z00.6 ENCOUNTER FOR EXAMINATION FOR NORMAL COMPARISON OR CONTROL IN CLINICAL RESEARCH PROGRAM: ICD-10-CM

## 2024-09-23 RX ORDER — PREGABALIN 50 MG/1
50 CAPSULE ORAL
Qty: 30 CAPSULE | Refills: 0 | Status: SHIPPED | OUTPATIENT
Start: 2024-09-23

## 2024-09-26 ENCOUNTER — APPOINTMENT (OUTPATIENT)
Dept: LAB | Age: 59
End: 2024-09-26

## 2024-09-26 DIAGNOSIS — Z00.6 ENCOUNTER FOR EXAMINATION FOR NORMAL COMPARISON OR CONTROL IN CLINICAL RESEARCH PROGRAM: ICD-10-CM

## 2024-09-26 PROCEDURE — 36415 COLL VENOUS BLD VENIPUNCTURE: CPT

## 2024-09-28 DIAGNOSIS — G47.00 INSOMNIA, UNSPECIFIED TYPE: ICD-10-CM

## 2024-09-28 DIAGNOSIS — M76.31 ILIOTIBIAL BAND SYNDROME OF RIGHT SIDE: ICD-10-CM

## 2024-09-28 DIAGNOSIS — F41.9 ANXIETY: ICD-10-CM

## 2024-09-28 RX ORDER — CELECOXIB 100 MG/1
100 CAPSULE ORAL DAILY PRN
Qty: 30 CAPSULE | Refills: 0 | Status: SHIPPED | OUTPATIENT
Start: 2024-09-28

## 2024-09-30 RX ORDER — LORAZEPAM 0.5 MG/1
0.5 TABLET ORAL
Qty: 30 TABLET | Refills: 0 | Status: SHIPPED | OUTPATIENT
Start: 2024-09-30

## 2024-09-30 RX ORDER — ZOLPIDEM TARTRATE 10 MG/1
10 TABLET ORAL
Qty: 30 TABLET | Refills: 0 | Status: SHIPPED | OUTPATIENT
Start: 2024-09-30

## 2024-10-08 LAB
APOB+LDLR+PCSK9 GENE MUT ANL BLD/T: NOT DETECTED
BRCA1+BRCA2 DEL+DUP + FULL MUT ANL BLD/T: NOT DETECTED
MLH1+MSH2+MSH6+PMS2 GN DEL+DUP+FUL M: NOT DETECTED

## 2024-10-12 DIAGNOSIS — G47.00 INSOMNIA, UNSPECIFIED TYPE: ICD-10-CM

## 2024-10-12 DIAGNOSIS — F41.9 ANXIETY: ICD-10-CM

## 2024-10-12 RX ORDER — LEVOCETIRIZINE DIHYDROCHLORIDE 5 MG/1
5 TABLET, FILM COATED ORAL DAILY
Qty: 30 TABLET | Refills: 5 | Status: SHIPPED | OUTPATIENT
Start: 2024-10-12

## 2024-10-17 ENCOUNTER — ANNUAL EXAM (OUTPATIENT)
Dept: GYNECOLOGY | Facility: CLINIC | Age: 59
End: 2024-10-17
Payer: COMMERCIAL

## 2024-10-17 VITALS
HEIGHT: 63 IN | WEIGHT: 133 LBS | DIASTOLIC BLOOD PRESSURE: 70 MMHG | SYSTOLIC BLOOD PRESSURE: 124 MMHG | BODY MASS INDEX: 23.57 KG/M2

## 2024-10-17 DIAGNOSIS — Z01.419 ENCOUNTER FOR GYNECOLOGICAL EXAMINATION WITHOUT ABNORMAL FINDING: Primary | ICD-10-CM

## 2024-10-17 PROCEDURE — S0612 ANNUAL GYNECOLOGICAL EXAMINA: HCPCS | Performed by: OBSTETRICS & GYNECOLOGY

## 2024-10-17 NOTE — PROGRESS NOTES
Ambulatory Visit  Name: Korina Sparrow      : 1965      MRN: [id number]  Encounter Provider: Elio Estrada MD  Encounter Date: 10/17/2024   Encounter department: [facility] [place]    Assessment & Plan  Encounter for gynecological examination without abnormal finding             Normal breast and GYN exam  Normal colonoscopy   Normal Pap smear negative 2023  Normal Pap smear 2023  Normal DEXA scan 2023  Tobacco abuse    Plan: Schedule mammogram.  Recommend healthy diet and exercise.  Encouraged to quit smoking.    History of Present Illness G2, P1     Korina Sparrow is a 59 y.o. female who presents annual exam with no complaints.  Denies any pelvic pain vaginal bleeding or dyspareunia.  Denies any breast bowel or bladder issues.  No change in family history.  Medications reviewed.  Eating healthy and traveling.          Objective     LMP 2018 (Approximate)     Physical Exam  Vitals and nursing note reviewed.   Constitutional:       General: She is not in acute distress.     Appearance: She is well-developed.   HENT:      Head: Normocephalic and atraumatic.   Eyes:      Conjunctiva/sclera: Conjunctivae normal.   Cardiovascular:      Rate and Rhythm: Normal rate and regular rhythm.      Heart sounds: No murmur heard.  Pulmonary:      Effort: Pulmonary effort is normal. No respiratory distress.      Breath sounds: Normal breath sounds.   Chest:   Breasts:     Right: Normal.      Left: Normal.   Abdominal:      Palpations: Abdomen is soft.      Tenderness: There is no abdominal tenderness.   Genitourinary:     Vagina: Normal.      Cervix: Normal.      Uterus: Normal.       Adnexa: Right adnexa normal and left adnexa normal.      Rectum: Normal.      Comments: External Genitalia normal.  Normal urethra and [place]'s glands.  No prolapse cystocele or rectocele.  Musculoskeletal:         General: No swelling.      Cervical back: Neck supple.   Skin:     General: Skin  is warm and dry.      Capillary Refill: Capillary refill takes less than 2 seconds.   Neurological:      Mental Status: She is alert.   Psychiatric:         Mood and Affect: Mood normal.

## 2024-10-21 DIAGNOSIS — G25.81 RESTLESS LEG SYNDROME: ICD-10-CM

## 2024-10-22 RX ORDER — PREGABALIN 50 MG/1
50 CAPSULE ORAL
Qty: 30 CAPSULE | Refills: 0 | Status: SHIPPED | OUTPATIENT
Start: 2024-10-22

## 2024-10-23 DIAGNOSIS — Z12.31 ENCOUNTER FOR SCREENING MAMMOGRAM FOR MALIGNANT NEOPLASM OF BREAST: Primary | ICD-10-CM

## 2024-10-29 DIAGNOSIS — G47.00 INSOMNIA, UNSPECIFIED TYPE: ICD-10-CM

## 2024-10-29 RX ORDER — ZOLPIDEM TARTRATE 10 MG/1
10 TABLET ORAL
Qty: 30 TABLET | Refills: 0 | Status: SHIPPED | OUTPATIENT
Start: 2024-10-29

## 2024-10-30 ENCOUNTER — APPOINTMENT (OUTPATIENT)
Dept: LAB | Age: 59
End: 2024-10-30
Payer: COMMERCIAL

## 2024-10-30 DIAGNOSIS — Z13.6 SCREENING FOR CARDIOVASCULAR CONDITION: ICD-10-CM

## 2024-10-30 LAB
ALBUMIN SERPL BCG-MCNC: 4.2 G/DL (ref 3.5–5)
ALP SERPL-CCNC: 46 U/L (ref 34–104)
ALT SERPL W P-5'-P-CCNC: 15 U/L (ref 7–52)
ANION GAP SERPL CALCULATED.3IONS-SCNC: 3 MMOL/L (ref 4–13)
AST SERPL W P-5'-P-CCNC: 19 U/L (ref 13–39)
BILIRUB SERPL-MCNC: 0.42 MG/DL (ref 0.2–1)
BUN SERPL-MCNC: 15 MG/DL (ref 5–25)
CALCIUM SERPL-MCNC: 9.4 MG/DL (ref 8.4–10.2)
CHLORIDE SERPL-SCNC: 103 MMOL/L (ref 96–108)
CHOLEST SERPL-MCNC: 170 MG/DL
CO2 SERPL-SCNC: 31 MMOL/L (ref 21–32)
CREAT SERPL-MCNC: 0.75 MG/DL (ref 0.6–1.3)
GFR SERPL CREATININE-BSD FRML MDRD: 87 ML/MIN/1.73SQ M
GLUCOSE P FAST SERPL-MCNC: 80 MG/DL (ref 65–99)
HDLC SERPL-MCNC: 68 MG/DL
LDLC SERPL CALC-MCNC: 90 MG/DL (ref 0–100)
POTASSIUM SERPL-SCNC: 4.5 MMOL/L (ref 3.5–5.3)
PROT SERPL-MCNC: 6.5 G/DL (ref 6.4–8.4)
SODIUM SERPL-SCNC: 137 MMOL/L (ref 135–147)
TRIGL SERPL-MCNC: 60 MG/DL

## 2024-10-30 PROCEDURE — 80053 COMPREHEN METABOLIC PANEL: CPT

## 2024-10-30 PROCEDURE — 80061 LIPID PANEL: CPT

## 2024-10-30 PROCEDURE — 36415 COLL VENOUS BLD VENIPUNCTURE: CPT

## 2024-10-31 ENCOUNTER — OFFICE VISIT (OUTPATIENT)
Dept: INTERNAL MEDICINE CLINIC | Facility: CLINIC | Age: 59
End: 2024-10-31
Payer: COMMERCIAL

## 2024-10-31 VITALS
SYSTOLIC BLOOD PRESSURE: 140 MMHG | WEIGHT: 133 LBS | HEIGHT: 62 IN | TEMPERATURE: 100 F | BODY MASS INDEX: 24.48 KG/M2 | RESPIRATION RATE: 18 BRPM | HEART RATE: 94 BPM | DIASTOLIC BLOOD PRESSURE: 80 MMHG | OXYGEN SATURATION: 99 %

## 2024-10-31 DIAGNOSIS — J02.9 SORE THROAT: Primary | ICD-10-CM

## 2024-10-31 LAB
S PYO AG THROAT QL: NEGATIVE
SARS-COV-2 AG UPPER RESP QL IA: NEGATIVE
SL AMB POCT RAPID FLU A: NEGATIVE
SL AMB POCT RAPID FLU B: NEGATIVE
VALID CONTROL: NORMAL

## 2024-10-31 PROCEDURE — 99213 OFFICE O/P EST LOW 20 MIN: CPT | Performed by: INTERNAL MEDICINE

## 2024-10-31 PROCEDURE — 87811 SARS-COV-2 COVID19 W/OPTIC: CPT | Performed by: INTERNAL MEDICINE

## 2024-10-31 PROCEDURE — 87804 INFLUENZA ASSAY W/OPTIC: CPT | Performed by: INTERNAL MEDICINE

## 2024-10-31 PROCEDURE — 87880 STREP A ASSAY W/OPTIC: CPT | Performed by: INTERNAL MEDICINE

## 2024-10-31 NOTE — PATIENT INSTRUCTIONS
Problem List Items Addressed This Visit          Surgery/Wound/Pain    Sore throat - Primary     Patient had the COVID booster in September, she also had the flu shot.  Symptoms sound consistent with COVID or flu, will also check strep test with her having the red throat.  With patient having yellow mucus, will treat with Augmentin if COVID and flu come back negative         Relevant Orders    Poct Covid 19 Rapid Antigen Test    POCT rapid flu A and B    POCT rapid ANTIGEN strepA

## 2024-10-31 NOTE — ASSESSMENT & PLAN NOTE
Patient had the COVID booster in September, she also had the flu shot.  Symptoms sound consistent with COVID or flu, will also check strep test with her having the red throat.  With patient having yellow mucus, will treat with Augmentin if COVID and flu come back negative.  Patient can get over-the-counter cough medicine to use, follow-up if not improving    Orders:    Poct Covid 19 Rapid Antigen Test    POCT rapid flu A and B    POCT rapid ANTIGEN strepA    amoxicillin-clavulanate (AUGMENTIN) 875-125 mg per tablet; Take 1 tablet by mouth every 12 (twelve) hours for 7 days

## 2024-10-31 NOTE — PROGRESS NOTES
Ambulatory Visit  Name: Korina Sparrow      : 1965      MRN: [id number]  Encounter Provider: Jered Young MD  Encounter Date: 10/31/2024   Encounter department: [facility]    Assessment & Plan  Sore throat  Patient had the COVID booster in September, she also had the flu shot.  Symptoms sound consistent with COVID or flu, will also check strep test with her having the red throat.  With patient having yellow mucus, will treat with Augmentin if COVID and flu come back negative.  Patient can get over-the-counter cough medicine to use, follow-up if not improving    Orders:    Poct Covid 19 Rapid Antigen Test    POCT rapid flu A and B    POCT rapid ANTIGEN strepA    amoxicillin-clavulanate (AUGMENTIN) 875-125 mg per tablet; Take 1 tablet by mouth every 12 (twelve) hours for 7 days         History of Present Illness     Onset of symptoms 2 days ago.    Sore Throat   Associated symptoms include congestion and coughing (yellow mucus). Pertinent negatives include no headaches or shortness of breath.     Review of Systems   Constitutional:  Positive for chills and fever.   HENT:  Positive for congestion and sore throat.    Respiratory:  Positive for cough (yellow mucus). Negative for shortness of breath.    Musculoskeletal:  Positive for arthralgias and myalgias.   Neurological:  Negative for headaches.     Past Medical History:   Diagnosis Date    Abnormal Pap smear of cervix     futher testing was neg    Alcoholism ([facility]) 95/18    recovering alcoholic, drug addict/compulsive over-eater    Anemia     Anxiety     Arthritis     Callus     Complete Tear of the Anterior cruciate ligament tear of the knee     COPD (chronic obstructive pulmonary disease) ([facility])     Depression     Difficulty walking     Exposure to hepatitis B     Resolved: 17    Gonorrhea 1986    History of cholesteatoma     right ear    History of COVID-19 2022    mild s/s    History of prior pregnancies      A1    Irritable bowel syndrome     Lung disease     Papanicolaou smear 2010    NEG    Plantar fasciitis     PONV (postoperative nausea and vomiting)     Reactive airway disease     Last assessed: 16     Past Surgical History:   Procedure Laterality Date    BREAST CYST ASPIRATION Right     COLONOSCOPY      CYSTECTOMY Left     benign cystectomy below breast    DILATION AND CURETTAGE OF UTERUS      FACIAL RECONSTRUCTION SURGERY      FOOT SURGERY Right     endoscopic plantar fasciotomy    GUM SURGERY  2019    with bone graft    KNEE ARTHROSCOPY Left 1997    menisectomy    KNEE ARTHROSCOPY Left 10/1997    ACL repair    MAMMO (HISTORICAL) Bilateral 2010    benign    MYRINGOTOMY W/ TUBES Right 2019    Procedure: MYRINGOTOMY W/ INSERTION VENTILATION TUBE EAR;  Surgeon: Cani Matias DO;  Location: [facility];  Service: ENT    MI ARTHRP KNE CONDYLE&PLATU MEDIAL&LAT COMPARTMENTS Left 2022    Procedure: ARTHROPLASTY KNEE TOTAL W ROBOT with screw removal left proximal tibia;  Surgeon: Cooper Chapin MD;  Location: [facility];  Service: Orthopedics    MI CORRECTION HAMMERTOE Right 2022    Procedure: REPAIR HAMMERTOE 2nd;  Surgeon: aGvino Mcguire DPM;  Location: [facility];  Service: Podiatry    MI SURGICAL ARTHROSCOPY SHOULDER W/ROTATOR CUFF RPR Left 2018    Procedure: SHOULDER ARTHROSCOPY ROTATOR CUFF REPAIR; SUBACROMIAL DECOMPRESSION;  Surgeon: Michel Peoples MD;  [facility];  Service: Orthopedics    MI TMPP MASTOIDECT NTC/RCNSTED WALL W/O OCR Right 2019    Procedure: TYMPANOPLASTY;  Surgeon: Cain Matias DO;  Location: [facility];  Service: ENT    SHOULDER SURGERY      TONSILLECTOMY AND ADENOIDECTOMY      TOOTH EXTRACTION Right 2024    TUBAL LIGATION      VAGINAL DELIVERY  1987    WISDOM TOOTH EXTRACTION       Family History   Problem Relation Age of Onset    Mental illness Mother     Substance Abuse Mother     Lung cancer Mother      "Cancer Mother             Hyperlipidemia Mother     Hypertension Father         Benign Essential    Cataracts Father     Diabetes Father             Mental illness Father     Hyperlipidemia Father     Depression Father     Cancer Father             Schizophrenia Sister     Squamous cell carcinoma Sister     Diabetes Sister     Hypertension Sister     Mental illness Sister     Cancer Sister     Substance Abuse Sister     Diabetes Brother     Hypertension Brother     Hyperlipidemia Brother     Colon polyps Brother     Diabetes Brother     Hypertension Brother     Diabetes Brother         possibly one or both    No Known Problems Son     Cancer Maternal Grandmother             Diabetes Maternal Grandmother     Heart failure Maternal Grandfather     Lung cancer Paternal Grandmother     Hyperlipidemia Paternal Grandmother     Cancer Paternal Grandmother             Hyperlipidemia Paternal Grandfather     Pulmonary fibrosis Paternal Aunt     Schizophrenia Paternal Aunt     Diabetes Paternal Aunt     Mental illness Paternal Aunt     Mental illness Paternal Aunt      Social History     Tobacco Use    Smoking status: Every Day     Current packs/day: 0.75     Average packs/day: 0.8 packs/day for 44.8 years (33.6 ttl pk-yrs)     Types: Cigarettes     Start date: 1980    Smokeless tobacco: Never   Vaping Use    Vaping status: Never Used   Substance and Sexual Activity    Alcohol use: Not Currently     Comment: Quit 23 years ago. Recovering alcoholic    Drug use: Not Currently     Types: Cocaine, \"Crack\" cocaine     Comment: clean since     Sexual activity: Yes     Partners: Male     Birth control/protection: None     Current Outpatient Medications on File Prior to Visit   Medication Sig    albuterol (PROVENTIL HFA,VENTOLIN HFA) 90 mcg/act inhaler Inhale 2 puffs every 4 (four) hours as needed for wheezing    buPROPion (WELLBUTRIN XL) 150 mg 24 hr tablet Take 1 tablet (150 mg total) " by mouth every morning    Calcium Carbonate-Vit D-Min (Caltrate 600+D Plus Minerals) 600-800 MG-UNIT CHEW Chew 1 tablet daily    celecoxib (CeleBREX) 100 mg capsule Take 1 capsule (100 mg total) by mouth daily as needed for mild pain    ferrous sulfate 324 (65 Fe) mg Take one tablet daily.    ipratropium (ATROVENT) 0.02 % nebulizer solution Take 0.5 mg by nebulization 2 (two) times a day    levocetirizine (XYZAL) 5 MG tablet TAKE ONE TABLET BY MOUTH EVERY DAY    LORazepam (ATIVAN) 0.5 mg tablet Take 1 tablet (0.5 mg total) by mouth daily at bedtime as needed for anxiety    MELATONIN PO Take 10 mg by mouth daily at bedtime    Multiple Minerals-Vitamins (ROSLYN-MAG-ZINC-D PO) Take 1 tablet by mouth daily    Multiple Vitamins-Minerals (multivitamin with minerals) tablet Take 1 tablet by mouth daily    Personal Best Full Range SARAH Use as directed    pregabalin (LYRICA) 50 mg capsule TAKE ONE CAPSULE BY MOUTH DAILY AT BEDTIME    zolpidem (AMBIEN) 10 mg tablet Take 1 tablet (10 mg total) by mouth daily at bedtime as needed for sleep    guaiFENesin (MUCINEX) 600 mg 12 hr tablet Take 2 tablets (1,200 mg total) by mouth every 12 (twelve) hours (Patient not taking: Reported on 10/17/2024)    Homeopathic Products (RESTFUL LEGS PM SL)  (Patient not taking: Reported on 10/17/2024)    Loperamide HCl (IMODIUM PO) Take 2 mg by mouth in the morning       Allergies   Allergen Reactions    Percocet [Oxycodone-Acetaminophen] GI Intolerance    Metronidazole GI Intolerance     Immunization History   Administered Date(s) Administered    COVID-19 MODERNA VACC 0.5 ML IM 01/12/2021, 02/09/2021, 10/23/2021, 04/05/2022    COVID-19 Moderna Vac BIVALENT 12 Yr+ IM 0.5 ML 10/21/2022    COVID-19 Moderna mRNA Vaccine 12 Yr+ 50 mcg/0.5 mL (Spikevax) 09/17/2024    COVID-19 Pfizer mRNA vacc PF sae-sucrose 12 yr and older (Comirnaty) 09/24/2023    INFLUENZA 11/28/2016, 10/01/2021, 10/08/2022, 10/10/2022, 10/06/2023    Influenza Injectable, MDCK,  "Preservative Free, Quadrivalent, 0.5 mL 10/18/2019    Influenza Quadrivalent, 6-35 Months IM 11/28/2016    Influenza, injectable, quadrivalent, preservative free 0.5 mL 10/25/2020    Pneumococcal Conjugate Vaccine 20-valent (Pcv20), Polysace 10/16/2023    Tdap 07/18/2023    Tuberculin Skin Test-PPD Intradermal 09/16/2015, 09/26/2016, 10/03/2017    Zoster Vaccine Recombinant 11/13/2018, 01/14/2019    influenza, injectable, quadrivalent 10/29/2018     Objective     /80 (BP Location: Left arm, Patient Position: Sitting, Cuff Size: Standard)   Pulse 94   Temp 100 °F (37.8 °C)   Resp 18   Ht 5' 2\" (1.575 m)   Wt 60.3 kg (133 lb)   LMP 04/20/2018 (Approximate)   SpO2 99%   BMI 24.33 kg/m²     Physical Exam  Constitutional:       General: She is not in acute distress.     Appearance: Normal appearance.   HENT:      Mouth/Throat:      Pharynx: Posterior oropharyngeal erythema present. No oropharyngeal exudate.   Pulmonary:      Effort: Pulmonary effort is normal. No respiratory distress.      Breath sounds: Normal breath sounds. No stridor. No wheezing, rhonchi or rales.   Neurological:      Mental Status: She is alert.         "

## 2024-11-05 ENCOUNTER — RA CDI HCC (OUTPATIENT)
Dept: OTHER | Facility: HOSPITAL | Age: 59
End: 2024-11-05

## 2024-11-11 ENCOUNTER — OFFICE VISIT (OUTPATIENT)
Dept: INTERNAL MEDICINE CLINIC | Facility: CLINIC | Age: 59
End: 2024-11-11
Payer: COMMERCIAL

## 2024-11-11 VITALS
DIASTOLIC BLOOD PRESSURE: 70 MMHG | SYSTOLIC BLOOD PRESSURE: 100 MMHG | OXYGEN SATURATION: 97 % | RESPIRATION RATE: 18 BRPM | HEART RATE: 84 BPM | HEIGHT: 62 IN | WEIGHT: 131 LBS | BODY MASS INDEX: 24.11 KG/M2

## 2024-11-11 DIAGNOSIS — Z13.6 SCREENING FOR CARDIOVASCULAR CONDITION: Primary | ICD-10-CM

## 2024-11-11 DIAGNOSIS — J44.9 CHRONIC OBSTRUCTIVE PULMONARY DISEASE, UNSPECIFIED COPD TYPE (HCC): ICD-10-CM

## 2024-11-11 DIAGNOSIS — Z80.0 FAMILY HISTORY OF LIVER CANCER: ICD-10-CM

## 2024-11-11 DIAGNOSIS — Z13.0 SCREENING, ANEMIA, DEFICIENCY, IRON: ICD-10-CM

## 2024-11-11 DIAGNOSIS — F32.A DEPRESSION, UNSPECIFIED DEPRESSION TYPE: ICD-10-CM

## 2024-11-11 DIAGNOSIS — F43.21 GRIEVING: ICD-10-CM

## 2024-11-11 PROCEDURE — 99214 OFFICE O/P EST MOD 30 MIN: CPT | Performed by: INTERNAL MEDICINE

## 2024-11-11 NOTE — ASSESSMENT & PLAN NOTE
Supportive counseling and listening provided to the patient at this point in time she would like to hold off on counseling if any problems patient can let me know  I have spent a total time of 30 minutes in caring for this patient on the day of the visit/encounter including Diagnostic results, Risk factor reductions, Impressions, Counseling / Coordination of care, Documenting in the medical record, Reviewing / ordering tests, medicine, procedures  , and Obtaining or reviewing history  .

## 2024-11-11 NOTE — ASSESSMENT & PLAN NOTE
Patient's brother recently has passed from liver cancer/cirrhosis related to alcohol we will check ultrasound right upper quadrant abdomen  Orders:    US right upper quadrant; Future

## 2024-11-11 NOTE — ASSESSMENT & PLAN NOTE
Clinically stable and doing well continue the current medical regiment will continue monitor.  Continue Wellbutrin  mg once daily no SI currently grieving the loss of her brother supportive listening counseling provided

## 2024-11-11 NOTE — PROGRESS NOTES
Ambulatory Visit  Name: Korina Sparrow      : 1965      MRN: [id number]  Encounter Provider: Osman Cdeillo DO  Encounter Date: 2024   Enc[facility]: [facility]    Assessment & Plan  Screening for cardiovascular condition    Orders:    Comprehensive metabolic panel; Future    Lipid Panel with Direct LDL reflex; Future    Family history of liver cancer  Patient's brother recently has passed from liver cancer/cirrhosis related to alcohol we will check ultrasound right upper quadrant abdomen  Orders:    US right upper quadrant; Future    Screening, anemia, deficiency, iron    Orders:    CBC (Includes Diff/Plt) (Refl); Future    Depression, unspecified depression type  Clinically stable and doing well continue the current medical regiment will continue monitor.  Continue Wellbutrin  mg once daily no SI currently grieving the loss of her brother supportive listening counseling provided         Chronic obstructive pulmonary disease, unspecified COPD type (HCC)  Clinically stable and doing well continue the current medical regiment will continue monitor.  Currently on Atrovent nebulizer solution twice daily working with pulmonary Dr. Hodge flu vaccine up-to-date     RTO in 6 months call for any problems  Grieving  Supportive counseling and listening provided to the patient at this point in time she would like to hold off on counseling if any problems patient can let me know  I have spent a total time of 30 minutes in caring for this patient on the day of the visit/encounter including Diagnostic results, Risk factor reductions, Impressions, Counseling / Coordination of care, Documenting in the medical record, Reviewing / ordering tests, medicine, procedures  , and Obtaining or reviewing history  .           History of Present Illness     HPI 59-year old female coming in for a follow up office visit regarding family history of liver cancer, depression, COPD,  "grieving; the patient reports me compliant taking medications without untoward side effects the.  The patient is here to review his medical condition, update me on the medical condition and the patient reports me no hospitalizations and no ER visits.  No injuries no illnesses try to follow-up about site remains active.  She is seen lung specialist Dr. Hodge currently she is stable uses her nebulizer routinely.  Reports me grieving the loss of her brother who  from liver cancer recently related to cirrhosis and alcoholism sleeping, eating , supportive patient has recently seen pulmonologist Dr. Rajat Hodge in October she was given the order for low-dose CAT scan of the lungs to be completed in 2025. Father with gist tumor in the 70    History obtained from : patient  Review of Systems   Constitutional:  Negative for activity change, appetite change and unexpected weight change.   HENT:  Negative for congestion and postnasal drip.    Eyes:  Negative for visual disturbance.   Respiratory:  Negative for cough and shortness of breath.    Cardiovascular:  Negative for chest pain.   Gastrointestinal:  Negative for abdominal pain, diarrhea, nausea and vomiting.   Neurological:  Negative for dizziness, light-headedness and headaches.   Hematological:  Negative for adenopathy.     Social History     Tobacco Use    Smoking status: Every Day     Current packs/day: 0.75     Average packs/day: 0.7 packs/day for 44.9 years (33.6 ttl pk-yrs)     Types: Cigarettes     Start date: 1980    Smokeless tobacco: Never   Vaping Use    Vaping status: Never Used   Substance and Sexual Activity    Alcohol use: Not Currently     Comment: Quit 23 years ago. Recovering alcoholic    Drug use: Not Currently     Types: Cocaine, \"Crack\" cocaine     Comment: clean since     Sexual activity: Yes     Partners: Male     Birth control/protection: None         Objective     /70 (BP Location: Left arm, Patient Position: " "Sitting, Cuff Size: Standard)   Pulse 84   Resp 18   Ht 5' 2\" (1.575 m)   Wt 59.4 kg (131 lb)   LMP 04/20/2018 (Approximate)   SpO2 97%   BMI 23.96 kg/m²     Physical Exam  Vitals and nursing note reviewed.   Constitutional:       General: She is not in acute distress.     Appearance: Normal appearance. She is well-developed. She is not ill-appearing, toxic-appearing or diaphoretic.   HENT:      Head: Normocephalic and atraumatic.      Right Ear: External ear normal.      Left Ear: External ear normal.      Nose: Nose normal.   Eyes:      Pupils: Pupils are equal, round, and reactive to light.   Cardiovascular:      Rate and Rhythm: Normal rate and regular rhythm.      Heart sounds: Normal heart sounds. No murmur heard.  Pulmonary:      Effort: Pulmonary effort is normal.      Breath sounds: Normal breath sounds.   Abdominal:      General: There is no distension.      Palpations: Abdomen is soft.      Tenderness: There is no abdominal tenderness. There is no guarding.   Neurological:      Mental Status: She is alert.   Psychiatric:         Mood and Affect: Mood is not anxious or depressed.         Thought Content: Thought content does not include suicidal ideation.      Comments: Grieving         "

## 2024-11-11 NOTE — ASSESSMENT & PLAN NOTE
Clinically stable and doing well continue the current medical regiment will continue monitor.  Currently on Atrovent nebulizer solution twice daily working with pulmonary Dr. Hodge flu vaccine up-to-date     RTO in 6 months call for any problems

## 2024-11-14 ENCOUNTER — HOSPITAL ENCOUNTER (OUTPATIENT)
Dept: RADIOLOGY | Age: 59
Discharge: HOME | End: 2024-11-14
Payer: COMMERCIAL

## 2024-11-14 DIAGNOSIS — Z80.0 FAMILY HISTORY OF LIVER CANCER: ICD-10-CM

## 2024-11-14 PROCEDURE — 76705 ECHO EXAM OF ABDOMEN: CPT

## 2024-11-19 ENCOUNTER — RESULTS FOLLOW-UP (OUTPATIENT)
Dept: INTERNAL MEDICINE CLINIC | Facility: CLINIC | Age: 59
End: 2024-11-19

## 2024-11-19 DIAGNOSIS — D13.5 ADENOMYOMATOSIS OF GALLBLADDER: Primary | ICD-10-CM

## 2024-11-20 DIAGNOSIS — G25.81 RESTLESS LEG SYNDROME: ICD-10-CM

## 2024-11-20 DIAGNOSIS — F41.9 ANXIETY DISORDER, UNSPECIFIED TYPE: ICD-10-CM

## 2024-11-20 RX ORDER — PREGABALIN 50 MG/1
50 CAPSULE ORAL
Qty: 30 CAPSULE | Refills: 0 | Status: SHIPPED | OUTPATIENT
Start: 2024-11-20

## 2024-11-20 RX ORDER — BUPROPION HYDROCHLORIDE 150 MG/1
150 TABLET ORAL DAILY
Qty: 30 TABLET | Refills: 5 | Status: SHIPPED | OUTPATIENT
Start: 2024-11-20

## 2024-11-24 ENCOUNTER — PATIENT MESSAGE (OUTPATIENT)
Dept: INTERNAL MEDICINE CLINIC | Facility: CLINIC | Age: 59
End: 2024-11-24

## 2024-11-26 ENCOUNTER — PATIENT MESSAGE (OUTPATIENT)
Dept: INTERNAL MEDICINE CLINIC | Facility: CLINIC | Age: 59
End: 2024-11-26

## 2024-11-26 ENCOUNTER — PATIENT MESSAGE (OUTPATIENT)
Age: 59
End: 2024-11-26

## 2024-11-26 ENCOUNTER — OFFICE VISIT (OUTPATIENT)
Dept: INTERNAL MEDICINE CLINIC | Facility: CLINIC | Age: 59
End: 2024-11-26
Payer: COMMERCIAL

## 2024-11-26 VITALS — DIASTOLIC BLOOD PRESSURE: 80 MMHG | SYSTOLIC BLOOD PRESSURE: 100 MMHG | BODY MASS INDEX: 24.33 KG/M2 | WEIGHT: 133 LBS

## 2024-11-26 DIAGNOSIS — F32.A DEPRESSION, UNSPECIFIED DEPRESSION TYPE: Primary | ICD-10-CM

## 2024-11-26 DIAGNOSIS — F41.9 ANXIETY DISORDER, UNSPECIFIED TYPE: ICD-10-CM

## 2024-11-26 DIAGNOSIS — F43.21 GRIEVING: ICD-10-CM

## 2024-11-26 PROCEDURE — 99214 OFFICE O/P EST MOD 30 MIN: CPT | Performed by: INTERNAL MEDICINE

## 2024-11-26 RX ORDER — BUPROPION HYDROCHLORIDE 300 MG/1
300 TABLET ORAL DAILY
Qty: 30 TABLET | Refills: 1 | Status: SHIPPED | OUTPATIENT
Start: 2024-11-26

## 2024-11-26 NOTE — ASSESSMENT & PLAN NOTE
Increase Wellbutrin XL to 300 mg once daily see psychiatry Dr. Kirby no SI    Orders:    buPROPion (WELLBUTRIN XL) 300 mg 24 hr tablet; Take 1 tablet (300 mg total) by mouth daily    Ambulatory referral to Psych Services; Future

## 2024-11-26 NOTE — PROGRESS NOTES
Name: Korina Sparrow      : 1965      MRN: [id number]  Encounter Provider: Osman Ceidllo DO  Encounter Date: 2024   [facility]: [facility]  :  Assessment & Plan  Anxiety disorder, unspecified type  Symptoms of depression and anxiety related to grieving the loss of her brother patient does report a vague thoughts of death she reports she is currently not experiencing these thoughts and would never act on these thoughts she wants to be alive.  She reports me that if she has these thoughts again she will notify us immediately she has agreed to this I will have the patient see psychiatry Dr. Kirby I will increase the dose of Wellbutrin XL to 300 mg once daily I have provided supportive counseling and listening RTO in 4 weeks well-developed patient does report to me that brother did have a gun I did recommend to have a gun removed from the house and put in a safe area  Orders:    buPROPion (WELLBUTRIN XL) 300 mg 24 hr tablet; Take 1 tablet (300 mg total) by mouth daily    Ambulatory referral to Psych Services; Future    Depression, unspecified depression type  Increase Wellbutrin XL to 300 mg once daily see psychiatry Dr. Kirby no SI    Orders:    buPROPion (WELLBUTRIN XL) 300 mg 24 hr tablet; Take 1 tablet (300 mg total) by mouth daily    Ambulatory referral to Psych Services; Future    Grieving  Supportive counseling and listening provided for the patient today patient does report to me she has a support of her  she did not want to go for grief counseling at this point we will continue to monitor patient RTO in 4 weeks call if any problems             Depression Screening and Follow-up Plan: Patient's depression screening was positive with a PHQ-9 score of 14. Patient assessed for underlying major depression. Brief counseling provided and recommend additional follow-up/re-evaluation next office visit.     Tobacco Cessation Counseling: Tobacco cessation  "counseling was provided. The patient is sincerely urged to quit consumption of tobacco. She is not ready to quit tobacco.       History of Present Illness     HPI 59-year old female coming in for a follow up office visit regarding grieving, anxiety, depression; the patient reports me compliant taking medications without untoward side effects the.  The patient is here to review his medical condition, update me on the medical condition and the patient reports me no hospitalizations and no ER visits.  Patient does report to me symptoms of sad , weepey, decrease , over the weekend tought of suicide, patient does report to me she would never act on these thoughts no plan brother passed on went to brothers house and  no si    Review of Systems   Constitutional:  Negative for activity change, appetite change and unexpected weight change.   HENT:  Negative for congestion and postnasal drip.    Eyes:  Negative for visual disturbance.   Respiratory:  Negative for cough and shortness of breath.    Cardiovascular:  Negative for chest pain.   Gastrointestinal:  Negative for abdominal pain, diarrhea, nausea and vomiting.   Neurological:  Negative for dizziness, light-headedness and headaches.   Hematological:  Negative for adenopathy.   Psychiatric/Behavioral:  Negative for suicidal ideas.         Depressed mood     Social History     Tobacco Use    Smoking status: Every Day     Current packs/day: 0.75     Average packs/day: 0.8 packs/day for 44.9 years (33.7 ttl pk-yrs)     Types: Cigarettes     Start date: 1/1/1980    Smokeless tobacco: Never   Vaping Use    Vaping status: Never Used   Substance and Sexual Activity    Alcohol use: Not Currently     Comment: Quit 23 years ago. Recovering alcoholic    Drug use: Not Currently     Types: Cocaine, \"Crack\" cocaine     Comment: clean since 1995    Sexual activity: Yes     Partners: Male     Birth control/protection: None        Objective   LMP 04/20/2018 (Approximate)      Physical " Exam  Vitals and nursing note reviewed.   Constitutional:       General: She is not in acute distress.     Appearance: Normal appearance. She is well-developed. She is not ill-appearing, toxic-appearing or diaphoretic.   HENT:      Head: Normocephalic and atraumatic.      Right Ear: External ear normal.      Left Ear: External ear normal.      Nose: Nose normal.   Eyes:      Pupils: Pupils are equal, round, and reactive to light.   Cardiovascular:      Rate and Rhythm: Normal rate and regular rhythm.      Heart sounds: Normal heart sounds. No murmur heard.  Pulmonary:      Effort: Pulmonary effort is normal.      Breath sounds: Normal breath sounds.   Abdominal:      General: There is no distension.      Palpations: Abdomen is soft.      Tenderness: There is no abdominal tenderness. There is no guarding.   Psychiatric:         Mood and Affect: Mood is depressed. Mood is not anxious. Affect is tearful.         Thought Content: Thought content does not include suicidal ideation. Thought content does not include suicidal plan.      Comments: Grieving, previous thoughts of death currently no SI no active thoughts would never act on thoughts of death       Administrative Statements   I have spent a total time of 30 minutes in caring for this patient on the day of the visit/encounter including Instructions for management, Risk factor reductions, Impressions, Counseling / Coordination of care, Documenting in the medical record, and Obtaining or reviewing history  .

## 2024-11-26 NOTE — ASSESSMENT & PLAN NOTE
Symptoms of depression and anxiety related to grieving the loss of her brother patient does report a vague thoughts of death she reports she is currently not experiencing these thoughts and would never act on these thoughts she wants to be alive.  She reports me that if she has these thoughts again she will notify us immediately she has agreed to this I will have the patient see psychiatry Dr. Kirby I will increase the dose of Wellbutrin XL to 300 mg once daily I have provided supportive counseling and listening RTO in 4 weeks well-developed patient does report to me that brother did have a gun I did recommend to have a gun removed from the house and put in a safe area  Orders:    buPROPion (WELLBUTRIN XL) 300 mg 24 hr tablet; Take 1 tablet (300 mg total) by mouth daily    Ambulatory referral to Psych Services; Future

## 2024-11-29 DIAGNOSIS — M76.31 ILIOTIBIAL BAND SYNDROME OF RIGHT SIDE: ICD-10-CM

## 2024-11-29 DIAGNOSIS — F41.9 ANXIETY: ICD-10-CM

## 2024-11-29 DIAGNOSIS — G47.00 INSOMNIA, UNSPECIFIED TYPE: ICD-10-CM

## 2024-11-29 RX ORDER — CELECOXIB 100 MG/1
100 CAPSULE ORAL DAILY PRN
Qty: 90 CAPSULE | Refills: 1 | Status: SHIPPED | OUTPATIENT
Start: 2024-11-29

## 2024-11-29 RX ORDER — ZOLPIDEM TARTRATE 10 MG/1
10 TABLET ORAL
Qty: 30 TABLET | Refills: 0 | Status: SHIPPED | OUTPATIENT
Start: 2024-11-29

## 2024-11-29 RX ORDER — LORAZEPAM 0.5 MG/1
0.5 TABLET ORAL
Qty: 30 TABLET | Refills: 0 | Status: SHIPPED | OUTPATIENT
Start: 2024-11-29

## 2024-12-02 NOTE — ASSESSMENT & PLAN NOTE
Supportive counseling and listening provided for the patient today patient does report to me she has a support of her  she did not want to go for grief counseling at this point we will continue to monitor patient RTO in 4 weeks call if any problems

## 2024-12-09 ENCOUNTER — TELEPHONE (OUTPATIENT)
Dept: PSYCHIATRY | Facility: CLINIC | Age: 59
End: 2024-12-09

## 2024-12-09 NOTE — TELEPHONE ENCOUNTER
Writer called patient and LVM to make new patient appt at [facility] with Cyndee.  Transfer call to resident MR to make appt.

## 2024-12-11 ENCOUNTER — OFFICE VISIT (OUTPATIENT)
Dept: INTERNAL MEDICINE CLINIC | Facility: CLINIC | Age: 59
End: 2024-12-11
Payer: COMMERCIAL

## 2024-12-11 VITALS
SYSTOLIC BLOOD PRESSURE: 128 MMHG | TEMPERATURE: 98.8 F | HEART RATE: 88 BPM | OXYGEN SATURATION: 100 % | BODY MASS INDEX: 23.6 KG/M2 | HEIGHT: 63 IN | DIASTOLIC BLOOD PRESSURE: 58 MMHG | WEIGHT: 133.2 LBS

## 2024-12-11 DIAGNOSIS — F32.A DEPRESSION, UNSPECIFIED DEPRESSION TYPE: ICD-10-CM

## 2024-12-11 DIAGNOSIS — J30.2 SEASONAL ALLERGIES: ICD-10-CM

## 2024-12-11 DIAGNOSIS — F43.21 GRIEVING: ICD-10-CM

## 2024-12-11 DIAGNOSIS — J06.9 UPPER RESPIRATORY TRACT INFECTION, UNSPECIFIED TYPE: Primary | ICD-10-CM

## 2024-12-11 PROCEDURE — 99214 OFFICE O/P EST MOD 30 MIN: CPT | Performed by: INTERNAL MEDICINE

## 2024-12-11 RX ORDER — AMOXICILLIN 500 MG/1
500 CAPSULE ORAL EVERY 8 HOURS SCHEDULED
Qty: 30 CAPSULE | Refills: 0 | Status: SHIPPED | OUTPATIENT
Start: 2024-12-11 | End: 2024-12-21

## 2024-12-11 RX ORDER — FLUTICASONE PROPIONATE 50 MCG
2 SPRAY, SUSPENSION (ML) NASAL DAILY
Qty: 1 G | Refills: 0 | Status: SHIPPED | OUTPATIENT
Start: 2024-12-11

## 2024-12-11 NOTE — PROGRESS NOTES
Name: Korina Sparrow      : 1965      MRN: [id number]  Encounter Provider: Osman Cedillo DO  Encounter Date: 2024   Enc[facility] department: [facility]  :  Assessment & Plan  Upper respiratory tract infection, unspecified type  Patient does report to me an upper respiratory tract infection after cleaning her brothers house and basement exposed to dust, molds she describes symptoms of postnasal drip congestion and colorful mucus which is progressing leading to a secondary upper respiratory tract infection Rx for amoxicillin 500 mg 1 capsule every 8 hours for 10 days, Flonase 2 sprays in each nostril once a day patient would like to hold off on COVID test  Orders:  •  amoxicillin (AMOXIL) 500 mg capsule; Take 1 capsule (500 mg total) by mouth every 8 (eight) hours for 10 days  •  fluticasone (FLONASE) 50 mcg/act nasal spray; 2 sprays into each nostril daily  Plenty of fluids, rest Mucinex as directed as needed call if any change, worse or symptoms do not mayte  Recommend wearing a mask in a errol environment  Depression, unspecified depression type  Patient reports me significant improvements in her overall mood she has had improvements in her depression she is tolerating the current dose of Wellbutrin  mg once daily supportive counseling and listening provided  Patient will be seeing psychiatrist Dr. Kirby I encouraged follow-up       Grieving  Patient is making progress she is helping to process the belongings of her brother she is currently at the power of  for her brother today we did provide supportive listening and counseling he is making good progress I encouraged her to keep her appointment with psychiatry Dr. Kirby     I have spent a total time of 30 minutes in caring for this patient on the day of the visit/encounter including Instructions for management, Impressions, Counseling / Coordination of care, Documenting in the medical record, Reviewing /  ordering tests, medicine, procedures  , and Obtaining or reviewing history  .   Seasonal allergies  I suspect there is also a component of allergy that triggered the upper respiratory tract infection patient did have exposure to dust, cat dander, bleach which possibly triggered congestion and allergies leading to the upper respiratory tract infection secondarily I have prescribed Flonase 2 sprays in each nostril once a day I have recommended for 10 days for the future I like her to use a mask when working in the errol environment to try to prevent symptoms       Return to office 3 months  call if any problems       History of Present Illness   Expand All Collapse All  Name: Korina Sparrow      : 1965      MRN: [id number]  Encounter Provider: Osman Cedillo DO  Encounter Date: 2024   Encounter department: [facility]  :rhinorrhea cough , yellow muchou exposed to bleach , dust , cat dander ,  no f.c  Using pseud,           HPI 59-year old female coming in for a follow up office visit regarding upper respiratory tract infection, allergy, anxiety/depression and grieving; the patient reports me compliant taking medications without untoward side effects the.  The patient is here to review his medical condition, update me on the medical condition and the patient reports me no hospitalizations and no ER visits.  Patient does report to me she has developed rhinorrhea, cough and yellow thick mucus for the last several days she reports me that symptoms started after playing her brother's house out she had been exposed to bleach, dust, cat dander she has been using Sudafed without relief her symptoms no fever no chills her symptoms have been persistent.  Does not report to me sinus pain no ear pain no shortness of breath no exposures that she is aware of no si, depression better , cold sx.  The patient does report to me improvements in her depression since increasing Wellbutrin XL to  "300 mg once daily she has no further thoughts of death no SI she reports me improvements in her mood she is tolerating the Wellbutrin well she will be seeing psychiatrist Dr. Kirby in the near future.  She does report to me her brother is not helpful she is working on cleaning out her brother's house on her own and with her   Review of Systems   Constitutional:  Negative for activity change, appetite change, chills, fever and unexpected weight change.   HENT:  Positive for congestion and postnasal drip.    Respiratory:  Positive for cough. Negative for shortness of breath.    Cardiovascular:  Negative for chest pain.   Neurological:  Negative for dizziness, light-headedness and headaches.   Hematological:  Negative for adenopathy.       Objective   /58 (BP Location: Right arm, Patient Position: Sitting, Cuff Size: Standard)   Pulse 88   Temp 98.8 °F (37.1 °C) (Tympanic)   Ht 5' 2.5\" (1.588 m)   Wt 60.4 kg (133 lb 3.2 oz)   LMP 04/20/2018 (Approximate)   SpO2 100%   BMI 23.97 kg/m²   Yellow/white mucus nasal, postnasal drip  Physical Exam  Vitals and nursing note reviewed.   Constitutional:       General: She is not in acute distress.     Appearance: Normal appearance. She is well-developed. She is not diaphoretic.   HENT:      Head: Normocephalic and atraumatic.      Right Ear: External ear normal.      Left Ear: External ear normal.      Nose: Congestion present.      Mouth/Throat:      Pharynx: Oropharynx is clear. Posterior oropharyngeal erythema present. No oropharyngeal exudate.   Eyes:      General: No scleral icterus.        Right eye: No discharge.         Left eye: No discharge.      Conjunctiva/sclera: Conjunctivae normal.      Pupils: Pupils are equal, round, and reactive to light.   Cardiovascular:      Heart sounds: Normal heart sounds. No murmur heard.  Pulmonary:      Effort: No respiratory distress.      Breath sounds: No wheezing or rales.   Lymphadenopathy:      Cervical: No " cervical adenopathy.   Neurological:      Mental Status: She is alert.   Psychiatric:         Mood and Affect: Mood is not anxious or depressed.         Thought Content: Thought content does not include suicidal ideation.      Comments: Grieving improving

## 2024-12-12 PROBLEM — J30.2 SEASONAL ALLERGIES: Status: ACTIVE | Noted: 2024-12-12

## 2024-12-12 NOTE — ASSESSMENT & PLAN NOTE
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

## 2024-12-12 NOTE — ASSESSMENT & PLAN NOTE
Patient reports me significant improvements in her overall mood she has had improvements in her depression she is tolerating the current dose of Wellbutrin  mg once daily supportive counseling and listening provided  Patient will be seeing psychiatrist Dr. Kofi RENDON encouraged follow-up

## 2024-12-12 NOTE — ASSESSMENT & PLAN NOTE
Patient is making progress she is helping to process the belongings of her brother she is currently at the power of  for her brother today we did provide supportive listening and counseling he is making good progress I encouraged her to keep her appointment with psychiatry Dr. Kirby     I have spent a total time of 30 minutes in caring for this patient on the day of the visit/encounter including Instructions for management, Impressions, Counseling / Coordination of care, Documenting in the medical record, Reviewing / ordering tests, medicine, procedures  , and Obtaining or reviewing history  .

## 2024-12-22 DIAGNOSIS — G25.81 RESTLESS LEG SYNDROME: ICD-10-CM

## 2024-12-24 RX ORDER — PREGABALIN 50 MG/1
50 CAPSULE ORAL
Qty: 30 CAPSULE | Refills: 0 | Status: SHIPPED | OUTPATIENT
Start: 2024-12-24

## 2024-12-28 DIAGNOSIS — G47.00 INSOMNIA, UNSPECIFIED TYPE: ICD-10-CM

## 2024-12-30 RX ORDER — ZOLPIDEM TARTRATE 10 MG/1
10 TABLET ORAL
Qty: 30 TABLET | Refills: 0 | Status: SHIPPED | OUTPATIENT
Start: 2024-12-30

## 2025-01-09 DIAGNOSIS — F32.A DEPRESSION, UNSPECIFIED DEPRESSION TYPE: ICD-10-CM

## 2025-01-09 DIAGNOSIS — F41.9 ANXIETY DISORDER, UNSPECIFIED TYPE: ICD-10-CM

## 2025-01-09 RX ORDER — BUPROPION HYDROCHLORIDE 300 MG/1
300 TABLET ORAL DAILY
Qty: 30 TABLET | Refills: 1 | Status: SHIPPED | OUTPATIENT
Start: 2025-01-09

## 2025-01-15 ENCOUNTER — OFFICE VISIT (OUTPATIENT)
Dept: BEHAVIORAL/MENTAL HEALTH CLINIC | Facility: CLINIC | Age: 60
End: 2025-01-15

## 2025-01-15 NOTE — PSYCH
" PSYCHIATRIC EVALUATION     [facility]    Name and Date of Birth:  Korina Sparrow 59 y.o. 1965 MRN: [id number]    Date of Visit: January 15, 2025    Reason for visit: Full psychiatric intake assessment for medication management     Chief Complaint: \"dark thoughts\"    HPI     Korina Sparrow is 59 y.o.,  and living with , working part-time as a massage therapist, with one past hospitalization as a teenager.    Patient reports at beginning of November her brother unexpectedly passed away. She is the  of his estate and this has been difficult. Before his passing, seeing the consequences of his illness was difficult to watch. He concealed an alcohol problem. She continues to feel bothered by his passing, that he did not go to her about the alcohol problem that he knew about for multiple years. Within the next few weeks she hopefully will have completed settling the estate. After his passing, she had one moment of feeling overwhelmed and thought she wish she could \"check out.\" She was bothered by this. She denies further thoughts like this.There has been some discord with her  due to the stress.    She reports sleep and appetite are normal.    In the past two years there were episodes of depressive and anxious symptoms. Losing interest. Before his passing she reports she was responding well to bupropion and not having issues at work or home.     At this time she reports there are things she is looking forward to. Her  has been supportive of her through the situation. She reports having a good relationship with her therapist. She is planning also to go back to the gym.    She has been sober from alcohol for 29 years and is actively involved in AA.    Psychiatric Review Of Systems:    Appetite: no  Adverse eating: no  Weight changes: no  Insomnia/sleeplessness: no  Fatigue/anergy: no  Anhedonia/lack of interest: " no  Attention/concentration: no  Psychomotor agitation/retardation: no  Somatic symptoms: no  Anxiety/panic attack: no  Kae/hypomania: no  Hopelessness/helplessness/worthlessness: no  Self-injurious behavior/high-risk behavior: no  Suicidal ideation: no  Homicidal ideation: no  Auditory hallucinations: no  Visual hallucinations: no  Delusional thinking: no  Obsessive/compulsive symptoms: no    Review Of Systems:    Constitutional negative   ENT negative   Cardiovascular negative   Respiratory cough   Gastrointestinal negative   Genitourinary negative   Musculoskeletal negative   Integumentary negative   Neurological negative   Endocrine negative   Other Symptoms none, all other systems are negative       Past Psychiatric History:     Past psychiatric diagnoses:   Depression, anxiety  Inpatient psychiatric admissions:   15 years old  Prior outpatient psychiatric treatment:   Some years ago  Past/current psychotherapy:   current  History of suicidal attempts/gestures:   Overdose attempt 15 years old   History of non-suicidal self-injurious behavior:   None reported  History of violence/aggressive behaviors:   None reported  Psychotropic medication trials:   Escitalopram 10 mg   Bupropion 300 mg  to present  Eszopiclone 9027-0579  Lorazepam  to present  Zolpidem  to present  Trazodone 2020  Substance abuse inpatient/outpatient rehabilitation:   Once in   Eating disorder history:   no    Substance Abuse History:    Around [age] years old she believes she developed an alcohol problem which persisted for 15 years until age around 30.     In her 20s was using multiple other substances including psychedelics and amphetamines.    Currently smokes 4-5 cigarettes/day    2-3 cups coffee/day    Family Psychiatric History:     Family History   Problem Relation Age of Onset    Mental illness Mother     Substance Abuse Mother     Lung cancer Mother     Cancer Mother             Hyperlipidemia Mother      Alcohol abuse Mother     Rashes / Skin problems Mother         Granuloma annulare    Hypertension Father         Benign Essential    Cataracts Father     Diabetes Father             Mental illness Father     Hyperlipidemia Father     Depression Father     Cancer Father             Schizophrenia Sister     Squamous cell carcinoma Sister     Diabetes Sister     Hypertension Sister     Mental illness Sister     Cancer Sister     Substance Abuse Sister     Alcohol abuse Sister     Schizoaffective Disorder  Sister     Diabetes Brother         Controled by natural medicine    Hypertension Brother     Hyperlipidemia Brother     Colon polyps Brother     Diabetes Brother             Hypertension Brother     Liver cancer Brother     Cancer Brother          liver cancer and ESLD    Diabetes Brother         possibly one or both    No Known Problems Son     Cancer Maternal Grandmother             Diabetes Maternal Grandmother     Heart failure Maternal Grandfather     Lung cancer Paternal Grandmother     Hyperlipidemia Paternal Grandmother     Cancer Paternal Grandmother             Hyperlipidemia Paternal Grandfather     Pulmonary fibrosis Paternal Aunt     Schizophrenia Paternal Aunt     Diabetes Paternal Aunt     Mental illness Paternal Aunt     Mental illness Paternal Aunt      Sister and aunt reportedly have schizophrenia  Mother: alcohol use  Brother: alcohol use    Social History:    Developmental: nothing of note  Education: GED, massage therapy  Marital history:   Children: one adult son  Living arrangement, social support: , has other supports  Occupational History: massage therapy  Access to firearms:  has firearm, secured, no ammunition      Traumatic History:     As above.    Past Medical History:    Past Medical History:   Diagnosis Date    Abnormal Pap smear of cervix     futher testing was neg    Alcoholism ([facility]) 95/18     recovering alcoholic, drug addict/compulsive over-eater    Allergic rhinitis 24    Anemia     Anxiety     Arthritis     Asthma     Callus     Complete Tear of the Anterior cruciate ligament tear of the knee     COPD (chronic obstructive pulmonary disease) (HCC)     Depression     Difficulty walking     Exposure to hepatitis B     Resolved: 17    Fatigue 24    Covid    Gonorrhea 1986    History of cholesteatoma     right ear    History of COVID-19 2022    mild s/s    History of prior pregnancies     A1    HL (hearing loss)     Irritable bowel syndrome     Lung disease     Nasal congestion entire life    Obesity     Otitis media     Papanicolaou smear 2010    NEG    Plantar fasciitis     PONV (postoperative nausea and vomiting)     Reactive airway disease     Last assessed: 16    Sleep difficulties     Tonsillitis         Past Surgical History:   Procedure Laterality Date    BREAST CYST ASPIRATION Right     COLONOSCOPY      COSMETIC SURGERY      CYSTECTOMY Left     benign cystectomy below breast    DILATION AND CURETTAGE OF UTERUS      FACIAL RECONSTRUCTION SURGERY      FOOT SURGERY Right     endoscopic plantar fasciotomy    GUM SURGERY  2019    with bone graft    KNEE ARTHROSCOPY Left 1997    menisectomy    KNEE ARTHROSCOPY Left 10/1997    ACL repair    MAMMO (HISTORICAL) Bilateral 2010    benign    MYRINGOTOMY W/ TUBES Right 2019    Procedure: MYRINGOTOMY W/ INSERTION VENTILATION TUBE EAR;  Surgeon: Cain Matias DO;  Location: [facility];  Service: ENT    OK ARTHRP KNE CONDYLE&PLATU MEDIAL&LAT COMPARTMENTS Left 2022    Procedure: ARTHROPLASTY KNEE TOTAL W ROBOT with screw removal left proximal tibia;  Surgeon: Cooper Chapin MD;  Location: [facility];  Service: Orthopedics    OK CORRECTION HAMMERTOE Right 2022    Procedure: REPAIR HAMMERTOE 2nd;  Surgeon: Gavino Mcguire DPM;  Location:  MAIN OR;  Service: Podiatry    OK SURGICAL  "ARTHROSCOPY SHOULDER W/ROTATOR CUFF RPR Left 01/31/2018    Procedure: SHOULDER ARTHROSCOPY ROTATOR CUFF REPAIR; SUBACROMIAL DECOMPRESSION;  Surgeon: Michel Peoples MD;  Location: [facility]  MAIN [facility];  Service: Orthopedics    RI TMPP MASTOIDECT NTC/RCNSTED WALL W/O OCR Right 03/01/2019    Procedure: TYMPANOPLASTY;  Surgeon: Cain Matias DO;  Location: [facility];  Service: ENT    SHOULDER SURGERY      TONSILLECTOMY AND ADENOIDECTOMY      TOOTH EXTRACTION Right 07/2024    TUBAL LIGATION      VAGINAL DELIVERY  1987    WISDOM TOOTH EXTRACTION       Allergies   Allergen Reactions    Percocet [Oxycodone-Acetaminophen] GI Intolerance    Metronidazole GI Intolerance       History Review:    The following portions of the patient's history were reviewed and updated as appropriate: allergies, current medications, past family history, past medical history, past social history, past surgical history, and problem list.    OBJECTIVE:    Vital signs in last 24 hours:    There were no vitals filed for this visit.    Mental Status Evaluation:    Appearance Blue work attire, black jacket, short curly brown hair, earrings, good eye contact   Behavior/motor Calm and cooperative   Speech/language Normal rate and volume   Mood \"ok\"   Affect euthymic, appropriate, and full range   Thought process Logical and linear   Thought content No overt delusions, Denies hallucinations, Denies suicidal ideations   Cognition Awake and alert, oriented and memory grossly intact, and concentrates on questions   Insight/judgment Insight: good  Judgment: good       Laboratory Results: I have personally reviewed all pertinent laboratory/tests results    Office Visit on 10/31/2024   Component Date Value Ref Range Status    POCT SARS-CoV-2 Ag 10/31/2024 Negative  Negative Final    VALID CONTROL 10/31/2024 Valid   Final    RAPID FLU A 10/31/2024 Negative   Final    RAPID FLU B 10/31/2024 Negative   Final     RAPID STREP A 10/31/2024 Negative  Negative Final "   Appointment on 10/30/2024   Component Date Value Ref Range Status    Cholesterol 10/30/2024 170  See Comment mg/dL Final    Cholesterol:         Pediatric <18 Years        Desirable          <170 mg/dL      Borderline High    170-199 mg/dL      High               >=200 mg/dL        Adult >=18 Years            Desirable         <200 mg/dL      Borderline High   200-239 mg/dL      High              >239 mg/dL      Triglycerides 10/30/2024 60  See Comment mg/dL Final    Triglyceride:     0-9Y            <75mg/dL     10Y-17Y         <90 mg/dL       >=18Y     Normal          <150 mg/dL     Borderline High 150-199 mg/dL     High            200-499 mg/dL        Very High       >499 mg/dL    Specimen collection should occur prior to Metamizole administration due to the potential for falsely depressed results.    HDL, Direct 10/30/2024 68  >=50 mg/dL Final    LDL Calculated 10/30/2024 90  0 - 100 mg/dL Final    LDL Cholesterol:     Optimal           <100 mg/dl     Near Optimal      100-129 mg/dl     Above Optimal       Borderline High 130-159 mg/dl       High            160-189 mg/dl       Very High       >189 mg/dl         This screening LDL is a calculated result.   It does not have the accuracy of the Direct Measured LDL in the monitoring of patients with hyperlipidemia and/or statin therapy.   Direct Measure LDL ([id number]) must be ordered separately in these patients.    Sodium 10/30/2024 137  135 - 147 mmol/L Final    Potassium 10/30/2024 4.5  3.5 - 5.3 mmol/L Final    Chloride 10/30/2024 103  96 - 108 mmol/L Final    CO2 10/30/2024 31  21 - 32 mmol/L Final    ANION GAP 10/30/2024 3 (L)  4 - 13 mmol/L Final    BUN 10/30/2024 15  5 - 25 mg/dL Final    Creatinine 10/30/2024 0.75  0.60 - 1.30 mg/dL Final    Standardized to IDMS reference method    Glucose, Fasting 10/30/2024 80  65 - 99 mg/dL Final    Calcium 10/30/2024 9.4  8.4 - 10.2 mg/dL Final    AST 10/30/2024 19  13 - 39 U/L Final    ALT 10/30/2024 15  7 - 52 U/L  Final    Specimen collection should occur prior to Sulfasalazine administration due to the potential for falsely depressed results.     Alkaline Phosphatase 10/30/2024 46  34 - 104 U/L Final    Total Protein 10/30/2024 6.5  6.4 - 8.4 g/dL Final    Albumin 10/30/2024 4.2  3.5 - 5.0 g/dL Final    Total Bilirubin 10/30/2024 0.42  0.20 - 1.00 mg/dL Final    Use of this assay is not recommended for patients undergoing treatment with eltrombopag due to the potential for falsely elevated results.  N-acetyl-p-benzoquinone imine (metabolite of Acetaminophen) will generate erroneously low results in samples for patients that have taken an overdose of Acetaminophen.    eGFR 10/30/2024 87  ml/min/1.73sq m Final   Appointment on 09/26/2024   Component Date Value Ref Range Status    GARRIDO SYNDROME DNA ANALYSIS 09/26/2024 Not Detected   Final    Pathogenic variant not detected.    HEREDITARY BREAST & OVARIAN CANCER* 09/26/2024 Not Detected   Final    Pathogenic variant not detected.    FAMILIAL HYPERCHOLESTEROLEMIA DNA * 09/26/2024 Not Detected   Final    Comment: Pathogenic variant not detected.  Genes Tested: BRCA1, BRCA2, MLH1, MSH2, MSH6, PMS2, EPCAM, APOB, LDLR, LDLRAP1, PCSK9  Test Description: Helix Tier One Population Screen is a screening test that analyzes 11 genes related to hereditary breast and ovarian cancer (HBOC) syndrome, Garrido syndrome, and familial hypercholesterolemia. This test only reports clinically significant pathogenic and likely pathogenic variants, unlike diagnostic testing, which also reports variants of uncertain significance (VUS). In addition, analysis of the PMS2 gene excludes exons 11-15, which overlap with a known pseudogene (PMS2CL).  Disclaimer: This test was developed and validated by [employer] This test has not been cleared or approved by the [place] Food and Drug Administration (FDA). The [facility] laboratory is accredited by the College of American Pathologists (CAP) and certified under  the Clinical Laboratory Improvement Amendments (CLIA #: [id number]) to perform high-complexity clinical tests. This                            test is used for clinical purposes. It should not be regarded as investigational or for research.  Methods and Limitations: Extracted DNA is enriched for targeted regions and then sequenced using the Helix Exome+ (R) assay on an Illumina DNA sequencing system. Data is then aligned to a modified version of GRCh38 and all genes are analyzed using the JENNIFER transcript and JENNIFER Plus Clinical transcript, when available. Small variant calling is completed using a customized version of [employer]'s Infoflow software, augmented by a proprietary small variant caller for difficult variants. Copy number variants (CNVs) are then called using a proprietary bioinformatics pipeline based on depth analysis with a comparison to similarly sequenced samples. Analysis of the PMS2 gene is limited to exons 1-10. The interpretation and reporting of variants in APOB, PCSK9, and LDLR is specific to familial hypercholesterolemia; variants associated with hypobetalipoproteinemia are not included. Interpretation is                            based upon guidelines published by the American College of Medical Genetics and Genomics (ACMG) and the Association for Molecular Pathology (AMP) or their modification by [employer] Variant Curation Expert Panels when available. Interpretation is limited to the transcripts indicated on the report and +/- 10 bp into intronic regions, except as noted below. Helix variant classifications include pathogenic, likely pathogenic, variant of uncertain significance (VUS), likely benign, and benign. Only variants classified as pathogenic and likely pathogenic are included in the report. All reported variants are confirmed through secondary manual inspection of DNA sequence data or orthogonal testing. Risk estimations and management guidelines included in this report are based on  analysis of primary literature and recommendations of applicable professional societies, and should be regarded as approximations.Based on validation studies, this assay delivers > 99% sensitivity and specificity for single                            nucleotide variants and insertions and deletions (indels) up to 20 bp. Larger indels and complex variants are also reported but sensitivity may be reduced. Based on validation studies, this assay delivers > 99% sensitivity to multi-exon CNVs and > 90% sensitivity to single-exon CNVs. This test may not detect variants in challenging regions (such as short tandem repeats, homopolymer runs, and segment duplications), sub-exonic CNVs, chromosomal aneuploidy, or variants in the presence of mosaicism. Phasing will be attempted and reported, when possible. Structural rearrangements such as inversions, translocations, and gene conversions are not tested in this assay unless explicitly indicated. Additionally, deep intronic, promoter, and enhancer regions may not be covered. It is important to note that this is a screening test and cannot detect all disease-causing variants. A negative result does not guarantee the absence of a rare, undetectable variant in the genes analyzed; consider using a                            diagnostic test if there is significant personal and/or family history of one of the conditions analyzed by this test. Any potential incidental findings outside of these genes and conditions will not be identified, nor reported. The results of a genetic test may be influenced by various factors, including bone marrow transplantation, blood transfusions, or in rare cases, hematolymphoid neoplasms.Gene Specific Notes:BRCA1: sequencing analysis extends to CDS +/-20 bp; BRCA2: sequencing analysis extends to CDS +/-20 bp. EPCAM: analysis is limited to CNV of exons 8-9; MLH1: analysis includes CNV of the promoter; PMS2: analysis is limited to exons 1-10.  Sequencing  Location: Sequencing done at [facility] [address] [place] [address], [address], [place] [address] (IA# [id number])  Designation: Jf Melgar, PhD, FACG  Email: [email or web address]       Suicide/Homicide Risk Assessment:    Risk of Harm to Self:  The following ratings are based on assessment at the time of the interview and review of records  Demographic risk factors include: age: over 50 or older  Historical Risk Factors include: history of depression, history of anxiety, history of suicide attempt, history of substance use, history of traumatic experiences  Recent Specific Risk Factors include: current depressive symptoms, current anxiety symptoms, experienced fleeting suicidal ideation  Protective Factors: no current suicidal ideation, being a parent, being , compliant with medications, effective coping skills, having a desire to be alive, [employer] beliefs discouraging suicide, stable job, sobriety, supportive family, supportive friends  Weapons: gun. The following steps have been taken to ensure weapons are properly secured: secured, no bullets  Based on today's assessmentKorina presents the following risk of harm to self: minimal    Risk of Harm to Others:  The following ratings are based on assessment at the time of the interview and review of records  Demographic Risk Factors include: none.  Historical Risk Factors include: history of substance use.  Recent Specific Risk Factors include: none.  Protective Factors: no current homicidal ideation, access to mental health treatment, being a parent, being , effective coping skills, [employer] beliefs, sobriety, supportive family, supportive friends  Weapons: gun. The following steps have been taken to ensure weapons are properly secured: secured, no bullets  Based on today's assessmentKorina presents the following risk of harm to others: none    Assessment and Plan    Grief    Patient reports history of depressive and anxious  symptoms possibly suggesting history of generalized anxiety or major depression. She responded well to bupropion. Two months ago her younger brother passed away somewhat unexpectedly and she became responsible for settling his estate and has felt overwhelmed. She had a fleeting passive death wish when dealing with these issues. Currently she has not felt this way and is looking forward to multiple things, is enjoying working, and has good support from her , friends, and AA. She also has a good relationship with her therapist. Her functioning has not been significantly impaired and her experience is most consistent with a normal grief reaction at this time, rather than a primary depressive or anxious disorder.    The patient inquires about when she may be able to reduce dose of medication. Discussed general recommendation of at least 6-12 months of symptom remission and stability before trialling a reduction of the dose, at a stable time in her life without significant stressors.    Continue bupropion  mg QD for depressive symptoms  Continue psychotherapy  Referral to bereavement group therapy  Please re-consult if the patient's condition changes or if there are any new questions or concerns.              Treatment Recommendations/Precautions:  Aware of 24 hour and weekend coverage for urgent situations accessed by calling [facility] main practice number          Visit Time    Visit Start Time: 1400  Visit Stop Time: 1535  Total Visit Duration:  95 minutes    Elijah Kirby MD   01/15/25

## 2025-01-24 DIAGNOSIS — G25.81 RESTLESS LEG SYNDROME: ICD-10-CM

## 2025-01-24 RX ORDER — PREGABALIN 50 MG/1
50 CAPSULE ORAL
Qty: 30 CAPSULE | Refills: 0 | Status: SHIPPED | OUTPATIENT
Start: 2025-01-24

## 2025-01-27 DIAGNOSIS — F41.9 ANXIETY: ICD-10-CM

## 2025-01-27 DIAGNOSIS — G47.00 INSOMNIA, UNSPECIFIED TYPE: ICD-10-CM

## 2025-01-27 RX ORDER — LORAZEPAM 0.5 MG/1
0.5 TABLET ORAL
Qty: 30 TABLET | Refills: 0 | Status: SHIPPED | OUTPATIENT
Start: 2025-01-27

## 2025-01-27 RX ORDER — ZOLPIDEM TARTRATE 10 MG/1
10 TABLET ORAL
Qty: 30 TABLET | Refills: 0 | Status: SHIPPED | OUTPATIENT
Start: 2025-01-27

## 2025-01-29 DIAGNOSIS — G47.00 INSOMNIA, UNSPECIFIED TYPE: ICD-10-CM

## 2025-01-29 RX ORDER — ZOLPIDEM TARTRATE 10 MG/1
10 TABLET ORAL
Qty: 30 TABLET | Refills: 0 | OUTPATIENT
Start: 2025-01-29

## 2025-02-04 ENCOUNTER — HOSPITAL ENCOUNTER (OUTPATIENT)
Dept: RADIOLOGY | Age: 60
Discharge: HOME | End: 2025-02-04

## 2025-02-04 DIAGNOSIS — F17.210 NICOTINE DEPENDENCE, CIGARETTES, UNCOMPLICATED: ICD-10-CM

## 2025-02-10 ENCOUNTER — TELEPHONE (OUTPATIENT)
Age: 60
End: 2025-02-10

## 2025-02-10 NOTE — TELEPHONE ENCOUNTER
Pt has an appt with Dr. Cedillo in March and May. She wants to know which one she should keep. Please, advise.

## 2025-02-19 ENCOUNTER — HOSPITAL ENCOUNTER (OUTPATIENT)
Dept: RADIOLOGY | Age: 60
Discharge: HOME | End: 2025-02-19
Payer: COMMERCIAL

## 2025-02-19 VITALS — HEIGHT: 63 IN | BODY MASS INDEX: 23.57 KG/M2 | WEIGHT: 133 LBS

## 2025-02-19 DIAGNOSIS — Z12.31 ENCOUNTER FOR SCREENING MAMMOGRAM FOR MALIGNANT NEOPLASM OF BREAST: ICD-10-CM

## 2025-02-19 PROCEDURE — 77063 BREAST TOMOSYNTHESIS BI: CPT

## 2025-02-19 PROCEDURE — 77067 SCR MAMMO BI INCL CAD: CPT

## 2025-02-24 DIAGNOSIS — G25.81 RESTLESS LEG SYNDROME: ICD-10-CM

## 2025-02-25 ENCOUNTER — RESULTS FOLLOW-UP (OUTPATIENT)
Age: 60
End: 2025-02-25

## 2025-02-25 RX ORDER — PREGABALIN 50 MG/1
50 CAPSULE ORAL
Qty: 30 CAPSULE | Refills: 0 | Status: SHIPPED | OUTPATIENT
Start: 2025-02-25

## 2025-02-28 DIAGNOSIS — G47.00 INSOMNIA, UNSPECIFIED TYPE: ICD-10-CM

## 2025-02-28 RX ORDER — ZOLPIDEM TARTRATE 10 MG/1
10 TABLET ORAL
Qty: 30 TABLET | Refills: 0 | Status: SHIPPED | OUTPATIENT
Start: 2025-02-28

## 2025-03-17 DIAGNOSIS — M76.31 ILIOTIBIAL BAND SYNDROME OF RIGHT SIDE: ICD-10-CM

## 2025-03-17 DIAGNOSIS — F41.9 ANXIETY: ICD-10-CM

## 2025-03-18 DIAGNOSIS — F41.9 ANXIETY: ICD-10-CM

## 2025-03-18 RX ORDER — LORAZEPAM 0.5 MG/1
TABLET ORAL
Qty: 30 TABLET | Refills: 0 | OUTPATIENT
Start: 2025-03-18

## 2025-03-18 RX ORDER — LORAZEPAM 0.5 MG/1
0.5 TABLET ORAL
Qty: 30 TABLET | Refills: 0 | Status: SHIPPED | OUTPATIENT
Start: 2025-03-18

## 2025-03-18 RX ORDER — CELECOXIB 100 MG/1
100 CAPSULE ORAL DAILY PRN
Qty: 90 CAPSULE | Refills: 0 | Status: SHIPPED | OUTPATIENT
Start: 2025-03-18

## 2025-03-26 ENCOUNTER — APPOINTMENT (OUTPATIENT)
Dept: LAB | Age: 60
End: 2025-03-26
Payer: COMMERCIAL

## 2025-03-26 DIAGNOSIS — Z13.6 SCREENING FOR CARDIOVASCULAR CONDITION: ICD-10-CM

## 2025-03-26 DIAGNOSIS — G25.81 RESTLESS LEG SYNDROME: ICD-10-CM

## 2025-03-26 DIAGNOSIS — Z13.0 SCREENING, ANEMIA, DEFICIENCY, IRON: ICD-10-CM

## 2025-03-26 LAB
ALBUMIN SERPL BCG-MCNC: 4.5 G/DL (ref 3.5–5)
ALP SERPL-CCNC: 54 U/L (ref 34–104)
ALT SERPL W P-5'-P-CCNC: 18 U/L (ref 7–52)
ANION GAP SERPL CALCULATED.3IONS-SCNC: 7 MMOL/L (ref 4–13)
AST SERPL W P-5'-P-CCNC: 24 U/L (ref 13–39)
BASOPHILS # BLD AUTO: 0.05 THOUSANDS/ÂΜL (ref 0–0.1)
BASOPHILS NFR BLD AUTO: 1 % (ref 0–1)
BILIRUB SERPL-MCNC: 0.5 MG/DL (ref 0.2–1)
BUN SERPL-MCNC: 14 MG/DL (ref 5–25)
CALCIUM SERPL-MCNC: 9.5 MG/DL (ref 8.4–10.2)
CHLORIDE SERPL-SCNC: 99 MMOL/L (ref 96–108)
CHOLEST SERPL-MCNC: 186 MG/DL (ref ?–200)
CO2 SERPL-SCNC: 30 MMOL/L (ref 21–32)
CREAT SERPL-MCNC: 0.79 MG/DL (ref 0.6–1.3)
EOSINOPHIL # BLD AUTO: 0.14 THOUSAND/ÂΜL (ref 0–0.61)
EOSINOPHIL NFR BLD AUTO: 3 % (ref 0–6)
ERYTHROCYTE [DISTWIDTH] IN BLOOD BY AUTOMATED COUNT: 13.2 % (ref 11.6–15.1)
GFR SERPL CREATININE-BSD FRML MDRD: 82 ML/MIN/1.73SQ M
GLUCOSE P FAST SERPL-MCNC: 83 MG/DL (ref 65–99)
HCT VFR BLD AUTO: 42.1 % (ref 34.8–46.1)
HDLC SERPL-MCNC: 83 MG/DL
HGB BLD-MCNC: 13.8 G/DL (ref 11.5–15.4)
IMM GRANULOCYTES # BLD AUTO: 0.01 THOUSAND/UL (ref 0–0.2)
IMM GRANULOCYTES NFR BLD AUTO: 0 % (ref 0–2)
LDLC SERPL CALC-MCNC: 87 MG/DL (ref 0–100)
LYMPHOCYTES # BLD AUTO: 1.17 THOUSANDS/ÂΜL (ref 0.6–4.47)
LYMPHOCYTES NFR BLD AUTO: 25 % (ref 14–44)
MCH RBC QN AUTO: 31.5 PG (ref 26.8–34.3)
MCHC RBC AUTO-ENTMCNC: 32.8 G/DL (ref 31.4–37.4)
MCV RBC AUTO: 96 FL (ref 82–98)
MONOCYTES # BLD AUTO: 0.6 THOUSAND/ÂΜL (ref 0.17–1.22)
MONOCYTES NFR BLD AUTO: 13 % (ref 4–12)
NEUTROPHILS # BLD AUTO: 2.67 THOUSANDS/ÂΜL (ref 1.85–7.62)
NEUTS SEG NFR BLD AUTO: 58 % (ref 43–75)
NRBC BLD AUTO-RTO: 0 /100 WBCS
PLATELET # BLD AUTO: 203 THOUSANDS/UL (ref 149–390)
PMV BLD AUTO: 10.8 FL (ref 8.9–12.7)
POTASSIUM SERPL-SCNC: 4.4 MMOL/L (ref 3.5–5.3)
PROT SERPL-MCNC: 7.1 G/DL (ref 6.4–8.4)
RBC # BLD AUTO: 4.38 MILLION/UL (ref 3.81–5.12)
SODIUM SERPL-SCNC: 136 MMOL/L (ref 135–147)
TRIGL SERPL-MCNC: 81 MG/DL (ref ?–150)
WBC # BLD AUTO: 4.64 THOUSAND/UL (ref 4.31–10.16)

## 2025-03-26 PROCEDURE — 80053 COMPREHEN METABOLIC PANEL: CPT

## 2025-03-26 PROCEDURE — 80061 LIPID PANEL: CPT

## 2025-03-26 PROCEDURE — 36415 COLL VENOUS BLD VENIPUNCTURE: CPT

## 2025-03-26 PROCEDURE — 85025 COMPLETE CBC W/AUTO DIFF WBC: CPT

## 2025-03-27 RX ORDER — PREGABALIN 50 MG/1
50 CAPSULE ORAL
Qty: 30 CAPSULE | Refills: 0 | Status: SHIPPED | OUTPATIENT
Start: 2025-03-27

## 2025-03-29 DIAGNOSIS — G47.00 INSOMNIA, UNSPECIFIED TYPE: ICD-10-CM

## 2025-03-31 RX ORDER — ZOLPIDEM TARTRATE 10 MG/1
10 TABLET ORAL
Qty: 30 TABLET | Refills: 0 | Status: SHIPPED | OUTPATIENT
Start: 2025-03-31

## 2025-04-02 ENCOUNTER — OFFICE VISIT (OUTPATIENT)
Dept: INTERNAL MEDICINE CLINIC | Facility: CLINIC | Age: 60
End: 2025-04-02
Payer: COMMERCIAL

## 2025-04-02 ENCOUNTER — TELEPHONE (OUTPATIENT)
Dept: INTERNAL MEDICINE CLINIC | Facility: CLINIC | Age: 60
End: 2025-04-02

## 2025-04-02 VITALS
TEMPERATURE: 97.7 F | WEIGHT: 133.4 LBS | HEART RATE: 63 BPM | SYSTOLIC BLOOD PRESSURE: 118 MMHG | BODY MASS INDEX: 24.01 KG/M2 | DIASTOLIC BLOOD PRESSURE: 62 MMHG | OXYGEN SATURATION: 99 %

## 2025-04-02 DIAGNOSIS — F32.A DEPRESSION, UNSPECIFIED DEPRESSION TYPE: Primary | ICD-10-CM

## 2025-04-02 DIAGNOSIS — F43.21 GRIEVING: ICD-10-CM

## 2025-04-02 DIAGNOSIS — H90.71 MIXED CONDUCTIVE AND SENSORINEURAL HEARING LOSS OF RIGHT EAR WITH UNRESTRICTED HEARING OF LEFT EAR: ICD-10-CM

## 2025-04-02 DIAGNOSIS — J30.2 SEASONAL ALLERGIES: ICD-10-CM

## 2025-04-02 DIAGNOSIS — J44.9 CHRONIC OBSTRUCTIVE PULMONARY DISEASE, UNSPECIFIED COPD TYPE (HCC): ICD-10-CM

## 2025-04-02 PROCEDURE — 99214 OFFICE O/P EST MOD 30 MIN: CPT | Performed by: INTERNAL MEDICINE

## 2025-04-02 NOTE — ASSESSMENT & PLAN NOTE
Clinically stable and doing well continue the current medical regiment will continue monitor.  Encourage smoking cessation

## 2025-04-02 NOTE — PROGRESS NOTES
Name: Korina Sparrow      : 1965      MRN: [id number]  Encounter Provider: Osman Cedillo DO  Encounter Date: 2025   Enc[facility] department: [facility]  :  Assessment & Plan  Chronic obstructive pulmonary disease, unspecified COPD type (HCC)  Clinically stable and doing well continue the current medical regiment will continue monitor.  Encourage smoking cessation       Depression, unspecified depression type  Improvement in the overall symptoms, will continue Wellbutrin  mg once daily lorazepam 0.5 mg 1 tablet at bedtime as needed no SI overall the patient has made good progress we will continue to monitor         Grieving  Supportive counseling and listening provided today she has made good progress she has had symptom improvement       Mixed conductive and sensorineural hearing loss of right ear with unrestricted hearing of left ear  She has been working with audiology I did review the reports she did do a trial of hearing aid but because of the price and other expenses that had come up she would like to hold off on the hearing aid at this point in time if her symptoms change or worsen or if she would like to proceed with hearing aid she will notify us       Seasonal allergies  Clinically stable and doing well continue the current medical regiment will continue monitor.  Continue Xyzal 5 mg once daily     I have spent a total time of 30 minutes in caring for this patient on the day of the visit/encounter including Diagnostic results, Instructions for management, Impressions, Counseling / Coordination of care, Documenting in the medical record, and Obtaining or reviewing history  .          History of Present Illness   HPI  Review of Systems   Constitutional:  Negative for activity change, appetite change and unexpected weight change.   HENT:  Negative for congestion and postnasal drip.    Eyes:  Negative for visual disturbance.   Respiratory:  Negative for cough and  shortness of breath.    Cardiovascular:  Negative for chest pain.   Gastrointestinal:  Negative for abdominal pain, diarrhea, nausea and vomiting.   Neurological:  Negative for dizziness, light-headedness and headaches.   Hematological:  Negative for adenopathy.       Objective   /62 (BP Location: Left arm, Patient Position: Sitting, Cuff Size: Standard)   Pulse 63   Temp 97.7 °F (36.5 °C) (Tympanic)   Wt 60.5 kg (133 lb 6.4 oz)   LMP 04/20/2018 (Approximate)   SpO2 99%   BMI 24.01 kg/m²      Physical Exam  Vitals and nursing note reviewed.   Constitutional:       General: She is not in acute distress.     Appearance: Normal appearance. She is well-developed. She is not ill-appearing, toxic-appearing or diaphoretic.   HENT:      Head: Normocephalic and atraumatic.      Right Ear: External ear normal.      Left Ear: External ear normal.      Nose: Nose normal.   Eyes:      Pupils: Pupils are equal, round, and reactive to light.   Cardiovascular:      Rate and Rhythm: Normal rate and regular rhythm.      Heart sounds: Normal heart sounds. No murmur heard.  Pulmonary:      Effort: Pulmonary effort is normal.      Breath sounds: Normal breath sounds.   Abdominal:      General: There is no distension.      Palpations: Abdomen is soft.      Tenderness: There is no abdominal tenderness. There is no guarding.   Psychiatric:         Mood and Affect: Mood is not anxious or depressed.         Thought Content: Thought content does not include suicidal ideation.       Negative edema distal pulses 2 or 2

## 2025-04-02 NOTE — TELEPHONE ENCOUNTER
----- Message from Osman Cedillo DO sent at 4/2/2025  2:21 PM EDT -----  Please contact the patient's pharmacy [facility] and let them know that the patient is currently on the Wellbutrin  mg once daily we have discontinued the Wellbutrin  please do not have him fill this prescription any further.

## 2025-04-03 NOTE — ASSESSMENT & PLAN NOTE
Supportive counseling and listening provided today she has made good progress she has had symptom improvement

## 2025-04-03 NOTE — ASSESSMENT & PLAN NOTE
Clinically stable and doing well continue the current medical regiment will continue monitor.  Continue Xyzal 5 mg once daily     I have spent a total time of 30 minutes in caring for this patient on the day of the visit/encounter including Diagnostic results, Instructions for management, Impressions, Counseling / Coordination of care, Documenting in the medical record, and Obtaining or reviewing history  .

## 2025-04-03 NOTE — ASSESSMENT & PLAN NOTE
Improvement in the overall symptoms, will continue Wellbutrin  mg once daily lorazepam 0.5 mg 1 tablet at bedtime as needed no SI overall the patient has made good progress we will continue to monitor

## 2025-04-03 NOTE — ASSESSMENT & PLAN NOTE
She has been working with audiology I did review the reports she did do a trial of hearing aid but because of the price and other expenses that had come up she would like to hold off on the hearing aid at this point in time if her symptoms change or worsen or if she would like to proceed with hearing aid she will notify us

## 2025-04-19 DIAGNOSIS — G47.00 INSOMNIA, UNSPECIFIED TYPE: ICD-10-CM

## 2025-04-19 DIAGNOSIS — F41.9 ANXIETY: ICD-10-CM

## 2025-04-19 RX ORDER — LEVOCETIRIZINE DIHYDROCHLORIDE 5 MG/1
5 TABLET, FILM COATED ORAL DAILY
Qty: 30 TABLET | Refills: 5 | Status: SHIPPED | OUTPATIENT
Start: 2025-04-19

## 2025-04-25 DIAGNOSIS — G25.81 RESTLESS LEG SYNDROME: ICD-10-CM

## 2025-04-25 RX ORDER — PREGABALIN 50 MG/1
50 CAPSULE ORAL
Qty: 30 CAPSULE | Refills: 0 | Status: SHIPPED | OUTPATIENT
Start: 2025-04-25

## 2025-04-28 DIAGNOSIS — G47.00 INSOMNIA, UNSPECIFIED TYPE: ICD-10-CM

## 2025-04-28 RX ORDER — ZOLPIDEM TARTRATE 10 MG/1
10 TABLET ORAL
Qty: 30 TABLET | Refills: 0 | Status: SHIPPED | OUTPATIENT
Start: 2025-04-28

## 2025-05-12 ENCOUNTER — PATIENT MESSAGE (OUTPATIENT)
Dept: INTERNAL MEDICINE CLINIC | Facility: CLINIC | Age: 60
End: 2025-05-12

## 2025-05-12 DIAGNOSIS — F41.9 ANXIETY: ICD-10-CM

## 2025-05-12 DIAGNOSIS — G56.03 BILATERAL CARPAL TUNNEL SYNDROME: Primary | ICD-10-CM

## 2025-05-12 RX ORDER — LORAZEPAM 0.5 MG/1
0.5 TABLET ORAL
Qty: 30 TABLET | Refills: 0 | Status: SHIPPED | OUTPATIENT
Start: 2025-05-12

## 2025-05-12 NOTE — PATIENT COMMUNICATION
Patient called again as she has not heard back from us. She uses [facility]. Please advise as soon as possible.

## 2025-05-20 DIAGNOSIS — F41.9 ANXIETY DISORDER, UNSPECIFIED TYPE: ICD-10-CM

## 2025-05-20 DIAGNOSIS — F32.A DEPRESSION, UNSPECIFIED DEPRESSION TYPE: ICD-10-CM

## 2025-05-20 RX ORDER — BUPROPION HYDROCHLORIDE 300 MG/1
300 TABLET ORAL DAILY
Qty: 30 TABLET | Refills: 1 | Status: SHIPPED | OUTPATIENT
Start: 2025-05-20

## 2025-05-22 ENCOUNTER — TELEPHONE (OUTPATIENT)
Age: 60
End: 2025-05-22

## 2025-05-22 NOTE — TELEPHONE ENCOUNTER
Patient is calling asking if she needs an updated measle vaccine?  She said since the outbreak she was wondering if she needs it.    Please call patient back.  Thank you

## 2025-05-23 NOTE — TELEPHONE ENCOUNTER
LM on VM for patient to call back to provide the dates of her last MMR vaccines or to let us know if she would like blood work ordered to test her immunity.

## 2025-05-25 DIAGNOSIS — G25.81 RESTLESS LEG SYNDROME: ICD-10-CM

## 2025-05-27 DIAGNOSIS — G47.00 INSOMNIA, UNSPECIFIED TYPE: ICD-10-CM

## 2025-05-27 DIAGNOSIS — Z00.00 HEALTHCARE MAINTENANCE: Primary | ICD-10-CM

## 2025-05-27 DIAGNOSIS — J44.9 CHRONIC OBSTRUCTIVE PULMONARY DISEASE, UNSPECIFIED COPD TYPE (HCC): ICD-10-CM

## 2025-05-27 RX ORDER — PREGABALIN 50 MG/1
50 CAPSULE ORAL
Qty: 30 CAPSULE | Refills: 0 | Status: SHIPPED | OUTPATIENT
Start: 2025-05-27

## 2025-05-27 RX ORDER — ALBUTEROL SULFATE 90 UG/1
2 INHALANT RESPIRATORY (INHALATION) EVERY 4 HOURS PRN
Qty: 6.7 G | Refills: 3 | Status: SHIPPED | OUTPATIENT
Start: 2025-05-27

## 2025-05-28 ENCOUNTER — APPOINTMENT (OUTPATIENT)
Dept: LAB | Age: 60
End: 2025-05-28
Payer: COMMERCIAL

## 2025-05-28 DIAGNOSIS — G47.00 INSOMNIA, UNSPECIFIED TYPE: ICD-10-CM

## 2025-05-28 DIAGNOSIS — Z00.00 HEALTHCARE MAINTENANCE: ICD-10-CM

## 2025-05-28 PROCEDURE — 86762 RUBELLA ANTIBODY: CPT

## 2025-05-28 PROCEDURE — 86765 RUBEOLA ANTIBODY: CPT

## 2025-05-28 PROCEDURE — 86735 MUMPS ANTIBODY: CPT

## 2025-05-28 RX ORDER — ZOLPIDEM TARTRATE 10 MG/1
10 TABLET ORAL
Qty: 30 TABLET | Refills: 0 | Status: SHIPPED | OUTPATIENT
Start: 2025-05-28

## 2025-05-29 LAB
MEV IGG SER IA-ACNC: >300 AU/ML
MUV IGG SER IA-ACNC: 289 AU/ML
RUBV IGG SERPL IA-ACNC: 2.34 INDEX

## 2025-05-29 RX ORDER — ZOLPIDEM TARTRATE 10 MG/1
10 TABLET ORAL
Qty: 30 TABLET | Refills: 0 | OUTPATIENT
Start: 2025-05-29

## 2025-05-30 ENCOUNTER — RESULTS FOLLOW-UP (OUTPATIENT)
Dept: INTERNAL MEDICINE CLINIC | Facility: CLINIC | Age: 60
End: 2025-05-30

## 2025-06-22 DIAGNOSIS — G25.81 RESTLESS LEG SYNDROME: ICD-10-CM

## 2025-06-23 RX ORDER — PREGABALIN 50 MG/1
50 CAPSULE ORAL
Qty: 30 CAPSULE | Refills: 0 | Status: SHIPPED | OUTPATIENT
Start: 2025-06-23

## 2025-06-24 DIAGNOSIS — G25.81 RESTLESS LEG SYNDROME: ICD-10-CM

## 2025-06-24 RX ORDER — PREGABALIN 50 MG/1
50 CAPSULE ORAL
Qty: 30 CAPSULE | Refills: 0 | OUTPATIENT
Start: 2025-06-24

## 2025-07-01 DIAGNOSIS — F41.9 ANXIETY: ICD-10-CM

## 2025-07-01 DIAGNOSIS — G47.00 INSOMNIA, UNSPECIFIED TYPE: ICD-10-CM

## 2025-07-01 RX ORDER — ZOLPIDEM TARTRATE 10 MG/1
10 TABLET ORAL
Qty: 30 TABLET | Refills: 0 | Status: SHIPPED | OUTPATIENT
Start: 2025-07-01

## 2025-07-01 RX ORDER — LORAZEPAM 0.5 MG/1
0.5 TABLET ORAL
Qty: 30 TABLET | Refills: 0 | Status: SHIPPED | OUTPATIENT
Start: 2025-07-01

## 2025-07-01 NOTE — TELEPHONE ENCOUNTER
Reason for call:   [x] Refill   [] Prior Auth  [] Other:     Office:   [x] PCP/Provider -   [] Specialty/Provider -     Medication:     LORazepam (ATIVAN) 0.5 mgTake 1 tablet (0.5 mg total) by mouth daily at bedtime as needed for anxiety          zolpidem (AMBIEN) 10 mg Take 1 tablet (10 mg total) by mouth daily at bedtime as needed for sleep        Pharmacy: [facility] [address] [place], [place]     Local Pharmacy   Does the patient have enough for 3 days?   [] Yes   [x] No - Send as HP to POD    Mail Away Pharmacy   Does the patient have enough for 10 days?   [] Yes   [] No - Send as HP to POD

## 2025-07-14 DIAGNOSIS — F32.A DEPRESSION, UNSPECIFIED DEPRESSION TYPE: ICD-10-CM

## 2025-07-14 DIAGNOSIS — F41.9 ANXIETY DISORDER, UNSPECIFIED TYPE: ICD-10-CM

## 2025-07-16 RX ORDER — BUPROPION HYDROCHLORIDE 300 MG/1
300 TABLET ORAL DAILY
Qty: 30 TABLET | Refills: 1 | Status: SHIPPED | OUTPATIENT
Start: 2025-07-16

## 2025-07-24 DIAGNOSIS — G25.81 RESTLESS LEG SYNDROME: ICD-10-CM

## 2025-07-24 RX ORDER — PREGABALIN 50 MG/1
50 CAPSULE ORAL
Qty: 30 CAPSULE | Refills: 0 | Status: SHIPPED | OUTPATIENT
Start: 2025-07-24

## 2025-07-31 DIAGNOSIS — G47.00 INSOMNIA, UNSPECIFIED TYPE: ICD-10-CM

## 2025-07-31 DIAGNOSIS — M76.31 ILIOTIBIAL BAND SYNDROME OF RIGHT SIDE: ICD-10-CM

## 2025-08-01 DIAGNOSIS — G47.00 INSOMNIA, UNSPECIFIED TYPE: ICD-10-CM

## 2025-08-01 DIAGNOSIS — M76.31 ILIOTIBIAL BAND SYNDROME OF RIGHT SIDE: ICD-10-CM

## 2025-08-04 RX ORDER — CELECOXIB 100 MG/1
100 CAPSULE ORAL DAILY PRN
Qty: 90 CAPSULE | Refills: 0 | Status: SHIPPED | OUTPATIENT
Start: 2025-08-04

## 2025-08-04 RX ORDER — ZOLPIDEM TARTRATE 10 MG/1
10 TABLET ORAL
Qty: 30 TABLET | Refills: 0 | OUTPATIENT
Start: 2025-08-04

## 2025-08-04 RX ORDER — CELECOXIB 100 MG/1
CAPSULE ORAL
Qty: 90 CAPSULE | Refills: 0 | OUTPATIENT
Start: 2025-08-04

## 2025-08-04 RX ORDER — ZOLPIDEM TARTRATE 10 MG/1
10 TABLET ORAL
Qty: 30 TABLET | Refills: 0 | Status: SHIPPED | OUTPATIENT
Start: 2025-08-04

## (undated) DEVICE — PADDING CAST 4 IN  COTTON STRL

## (undated) DEVICE — VELYS SATEL DRAPE

## (undated) DEVICE — JP 3-SPRING RES W/10FR PVC DRAIN/TR: Brand: CARDINAL HEALTH

## (undated) DEVICE — HOOD: Brand: T7PLUS

## (undated) DEVICE — TRAY FOLEY 16FR URIMETER SURESTEP

## (undated) DEVICE — VELYS BLADE SAW OSC 85 X 19 X 2MM

## (undated) DEVICE — DRAPE SHEET X-LG

## (undated) DEVICE — SILVER-COATED ANTIBACTERIAL BARRIER DRESSING: Brand: ACTICOAT SURGIC 10X20CM 5PK US

## (undated) DEVICE — SPINNING CEMENT MIXING BOWL

## (undated) DEVICE — HOOD WITH PEEL AWAY FACE SHIELD: Brand: T7PLUS

## (undated) DEVICE — THE SIMPULSE SOLO SYSTEM WITH ULTREX RETRACTABLE SPLASH SHIELD TIP: Brand: SIMPULSE SOLO

## (undated) DEVICE — VELYS ROBOT-ASSISTED SOLUTION ARRAY DRILL PIN 125 MM X 4 MM: Brand: VELYS

## (undated) DEVICE — BETHLEHEM UNIV TOTAL KNEE, KIT: Brand: CARDINAL HEALTH

## (undated) DEVICE — 3M™ IOBAN™ 2 ANTIMICROBIAL INCISE DRAPE 6650EZ: Brand: IOBAN™ 2

## (undated) DEVICE — STOCKINETTE REGULAR

## (undated) DEVICE — RECIPROCATING BLADE, DOUBLE SIDED, OFFSET  (70.0 X 0.64 X 12.6MM)

## (undated) DEVICE — DUAL CUT SAGITTAL BLADE

## (undated) DEVICE — SUT VICRYL PLUS 2-0 CTB-1 27 IN VCPB259H

## (undated) DEVICE — SUT VICRYL PLUS 1 CTB-1 36 IN VCPB947H

## (undated) DEVICE — HEAVY DUTY TABLE COVER: Brand: CONVERTORS

## (undated) DEVICE — SILVER-COATED ANTIMICROBIAL BARRIER DRESSING: Brand: ACTICOAT   4" X 8"

## (undated) DEVICE — GLOVE INDICATOR PI UNDERGLOVE SZ 8.5 BLUE

## (undated) DEVICE — ABDOMINAL PAD: Brand: DERMACEA

## (undated) DEVICE — PLUMEPEN PRO 10FT

## (undated) DEVICE — GLOVE SRG BIOGEL 8

## (undated) DEVICE — DRAPE EQUIPMENT RF WAND

## (undated) DEVICE — GAUZE SPONGES,16 PLY: Brand: CURITY

## (undated) DEVICE — STERILE PITCHER PACK: Brand: CARDINAL HEALTH

## (undated) DEVICE — COOL TEMP PAD

## (undated) DEVICE — NO-SCRATCH ™ SMALL WHITNEY CURETTE ™ IS A SINGLE-USE, PLASTIC CURETTE FOR QUICKLY APPLYING, MANIPULATING AND REMOVING BONE CEMENT DURING HIP AND KNEE REPLACEMENT SURGERY. THE PLASTIC IS SOFTER THAN STEEL INSTRUMENTS, REDUCING THE RISK OF DAMAGING THE PROSTHESIS WITH METAL INSTRUMENTS.  THE CURETTE’S 6MM TIP REMOVES EXCESS CEMENT FROM REPLACEMENT HIPS AND KNEES. EASY-TO-MANEUVER, THE SMALL BLUE CURETTE LETS YOU REMOVE CEMENT FROM ALL EDGES OF THE PROSTHESIS.NO-SCRATCH WHITNEY SMALL CURETTE FEATURES:SAFER THAN STEEL- MADE OF PLASTIC - STURDY YET SOFTER THAN SURGICAL STEEL.HANDIER- EACH TOOL HAS A MOLDED-IN THUMB INDENTATION INSTANTLY ORIENTING THE TOOL.- EASIER TO MANEUVER IN HARD TO SEE PLACES.- COLOR-CODED FOR EASY IDENTIFICATION.FASTER- COMES INDIVIDUALLY PACKAGED IN STERILE, PEEL OPEN POUCH, READY TO GO.- APPLIES, MANIPULATES, OR REMOVES CEMENT WITH FINGERTIP PRECISION.ECONOMICAL- THE COST OF A SINGLE REVISION DWARFS THE COST OF A SINGLE-USE CURETTE. - DISPOSABLE – THERE’S NO NEED TO WASTE TIME REMOVING HARDENED CEMENT OR RE-STERILIZING TOOLS.- LESS EXPENSIVE TO BUY AND INVENTORY - ORDER ONLY THE TOOL YOU USE.- PACKAGED 25 INDIVIDUALLY WRAPPED TOOLS TO A CARTON FOR CONVENIENT SHELF STORAGE.: Brand: WHITNEY NO-SCRATCH CURETTE (SMALL)

## (undated) DEVICE — SPONGE PVP SCRUB WING STERILE

## (undated) DEVICE — DRAPE SHEET THREE QUARTER

## (undated) DEVICE — VELYS ROBOTIC-ASSISTED SOLUTION ARRAY SET - KNEE: Brand: VELYS

## (undated) DEVICE — VELYS ROBOT DRAPE

## (undated) DEVICE — ACE WRAP 6 IN UNSTERILE

## (undated) DEVICE — ACE WRAP 6 IN STERILE

## (undated) DEVICE — CAPIT KNEE ATTUNE FB CEMENT -DEPUY